# Patient Record
Sex: MALE | Race: BLACK OR AFRICAN AMERICAN | HISPANIC OR LATINO | Employment: OTHER | URBAN - METROPOLITAN AREA
[De-identification: names, ages, dates, MRNs, and addresses within clinical notes are randomized per-mention and may not be internally consistent; named-entity substitution may affect disease eponyms.]

---

## 2017-09-16 ENCOUNTER — HOSPITAL ENCOUNTER (EMERGENCY)
Facility: HOSPITAL | Age: 37
Discharge: HOME | End: 2017-09-16

## 2017-09-16 VITALS
DIASTOLIC BLOOD PRESSURE: 110 MMHG | SYSTOLIC BLOOD PRESSURE: 164 MMHG | BODY MASS INDEX: 32.44 KG/M2 | RESPIRATION RATE: 20 BRPM | OXYGEN SATURATION: 98 % | HEIGHT: 64 IN | WEIGHT: 190 LBS | TEMPERATURE: 98 F | HEART RATE: 90 BPM

## 2017-09-16 DIAGNOSIS — R60.9 EDEMA: ICD-10-CM

## 2017-09-16 DIAGNOSIS — I10 ASYMPTOMATIC HYPERTENSION: ICD-10-CM

## 2017-09-16 DIAGNOSIS — R60.0 BILATERAL EDEMA OF LOWER EXTREMITY: Primary | ICD-10-CM

## 2017-09-16 LAB
ALBUMIN SERPL BCP-MCNC: 3.8 G/DL
ALP SERPL-CCNC: 75 U/L
ALT SERPL W/O P-5'-P-CCNC: 50 U/L
ANION GAP SERPL CALC-SCNC: 9 MMOL/L
AST SERPL-CCNC: 30 U/L
BACTERIA #/AREA URNS HPF: NORMAL /HPF
BASOPHILS # BLD AUTO: 0.01 K/UL
BASOPHILS NFR BLD: 0.1 %
BILIRUB SERPL-MCNC: 0.3 MG/DL
BILIRUB UR QL STRIP: NEGATIVE
BNP SERPL-MCNC: 36 PG/ML
BUN SERPL-MCNC: 20 MG/DL
CALCIUM SERPL-MCNC: 10 MG/DL
CHLORIDE SERPL-SCNC: 104 MMOL/L
CLARITY UR: CLEAR
CO2 SERPL-SCNC: 26 MMOL/L
COLOR UR: ABNORMAL
CREAT SERPL-MCNC: 1.2 MG/DL
DIFFERENTIAL METHOD: ABNORMAL
EOSINOPHIL # BLD AUTO: 0.2 K/UL
EOSINOPHIL NFR BLD: 2.4 %
ERYTHROCYTE [DISTWIDTH] IN BLOOD BY AUTOMATED COUNT: 12.9 %
EST. GFR  (AFRICAN AMERICAN): >60 ML/MIN/1.73 M^2
EST. GFR  (NON AFRICAN AMERICAN): >60 ML/MIN/1.73 M^2
GLUCOSE SERPL-MCNC: 149 MG/DL
GLUCOSE UR QL STRIP: ABNORMAL
HCT VFR BLD AUTO: 38.6 %
HGB BLD-MCNC: 13.4 G/DL
HGB UR QL STRIP: ABNORMAL
HYALINE CASTS #/AREA URNS LPF: 0 /LPF
KETONES UR QL STRIP: NEGATIVE
LEUKOCYTE ESTERASE UR QL STRIP: ABNORMAL
LYMPHOCYTES # BLD AUTO: 2.3 K/UL
LYMPHOCYTES NFR BLD: 34.5 %
MCH RBC QN AUTO: 28.7 PG
MCHC RBC AUTO-ENTMCNC: 34.7 G/DL
MCV RBC AUTO: 83 FL
MICROSCOPIC COMMENT: NORMAL
MONOCYTES # BLD AUTO: 0.5 K/UL
MONOCYTES NFR BLD: 8 %
NEUTROPHILS # BLD AUTO: 3.7 K/UL
NEUTROPHILS NFR BLD: 54.4 %
NITRITE UR QL STRIP: NEGATIVE
PH UR STRIP: 6 [PH] (ref 5–8)
PLATELET # BLD AUTO: 273 K/UL
PMV BLD AUTO: 10.2 FL
POTASSIUM SERPL-SCNC: 4.2 MMOL/L
PROT SERPL-MCNC: 7.8 G/DL
PROT UR QL STRIP: ABNORMAL
RBC # BLD AUTO: 4.67 M/UL
RBC #/AREA URNS HPF: 3 /HPF (ref 0–4)
SODIUM SERPL-SCNC: 139 MMOL/L
SP GR UR STRIP: 1.02 (ref 1–1.03)
SQUAMOUS #/AREA URNS HPF: 1 /HPF
TSH SERPL DL<=0.005 MIU/L-ACNC: 1.16 UIU/ML
URN SPEC COLLECT METH UR: ABNORMAL
UROBILINOGEN UR STRIP-ACNC: NEGATIVE EU/DL
WBC # BLD AUTO: 6.76 K/UL
WBC #/AREA URNS HPF: 1 /HPF (ref 0–5)

## 2017-09-16 PROCEDURE — 84443 ASSAY THYROID STIM HORMONE: CPT

## 2017-09-16 PROCEDURE — 99284 EMERGENCY DEPT VISIT MOD MDM: CPT | Mod: 25

## 2017-09-16 PROCEDURE — 82962 GLUCOSE BLOOD TEST: CPT

## 2017-09-16 PROCEDURE — 80053 COMPREHEN METABOLIC PANEL: CPT

## 2017-09-16 PROCEDURE — 81000 URINALYSIS NONAUTO W/SCOPE: CPT

## 2017-09-16 PROCEDURE — 83880 ASSAY OF NATRIURETIC PEPTIDE: CPT

## 2017-09-16 PROCEDURE — 85025 COMPLETE CBC W/AUTO DIFF WBC: CPT

## 2017-09-16 RX ORDER — INSULIN ASPART 100 [IU]/ML
20 INJECTION, SUSPENSION SUBCUTANEOUS
COMMUNITY

## 2017-09-16 RX ORDER — SULINDAC 200 MG/1
200 TABLET ORAL 2 TIMES DAILY
Qty: 10 TABLET | Refills: 0 | Status: ON HOLD | OUTPATIENT
Start: 2017-09-16 | End: 2018-05-10 | Stop reason: HOSPADM

## 2017-09-16 RX ORDER — LISINOPRIL 10 MG/1
20 TABLET ORAL DAILY
Status: ON HOLD | COMMUNITY
End: 2022-09-15 | Stop reason: HOSPADM

## 2017-09-16 NOTE — ED PROVIDER NOTES
Encounter Date: 9/16/2017    SCRIBE #1 NOTE: I, Zi Ozzy, am scribing for, and in the presence of,  Dada Espinal PA-C. I have scribed the following portions of the note - Other sections scribed: HPI/ROS.       History     Chief Complaint   Patient presents with    Leg Swelling     bilat leg swelling x 2 days.  denies n/v/d or fever.  denies other complaints.     CC: Leg Swelling    HPI: This 37 y.o. Male with a medical history of diabetes mellitus and HTN presents to the ED c/o new, severe (8/10) bilateral leg swelling since 2 days ago. Patient reports that he drove to [place] from [place] and back prior to swelling. Patient notes bilateral calf pain when laying down. He also reports a mild, intermittent headache. Patient has been compliant with antihypertensive and diabetic medications. No other tx. No alleviating or exacerbating factors. Patient denies fever, chills, back pain, chest pain, SOB, abdominal pain, dysuria, difficulty urinating, numbness, weakness, and any hx of blood clots or blood disorders.       The history is provided by the patient. A  was used.     Review of patient's allergies indicates:  No Known Allergies  Past Medical History:   Diagnosis Date    Diabetes mellitus     Hypertension      Past Surgical History:   Procedure Laterality Date    APPENDECTOMY       No family history on file.  Social History   Substance Use Topics    Smoking status: Never Smoker    Smokeless tobacco: Not on file    Alcohol use Yes      Comment: occasionally      Review of Systems   Constitutional: Negative for chills and fever.   HENT: Negative for congestion, ear pain, rhinorrhea and sore throat.    Eyes: Negative for pain and visual disturbance.   Respiratory: Negative for cough and shortness of breath.    Cardiovascular: Positive for leg swelling. Negative for chest pain.   Gastrointestinal: Negative for abdominal pain, diarrhea, nausea and vomiting.   Genitourinary: Negative  for dysuria.   Musculoskeletal: Negative for back pain and neck pain.        (+) Bilateral Calf Pain   Skin: Negative for rash.   Neurological: Positive for headaches. Negative for weakness and numbness.       Physical Exam     Initial Vitals [09/16/17 0811]   BP Pulse Resp Temp SpO2   (!) 169/86 89 16 97.8 °F (36.6 °C) 98 %      MAP       113.67         Physical Exam    Vitals reviewed.  Constitutional: He appears well-developed and well-nourished. He is not diaphoretic. No distress.   HENT:   Head: Normocephalic and atraumatic.   Right Ear: External ear normal.   Left Ear: External ear normal.   Nose: Nose normal.   Eyes: Conjunctivae are normal. No scleral icterus.   Neck: Normal range of motion. Neck supple. No JVD present.   Cardiovascular: Normal rate, regular rhythm, normal heart sounds and intact distal pulses.   Pulmonary/Chest: Breath sounds normal. No respiratory distress. He has no wheezes. He has no rhonchi. He has no rales. He exhibits no tenderness.   Abdominal: Soft. He exhibits no distension and no mass. There is no tenderness. There is no rebound and no guarding.   Musculoskeletal: Normal range of motion. He exhibits edema. He exhibits no tenderness.   Pretibial pitting edema bilaterally.  No calf tenderness.   Lymphadenopathy:     He has no cervical adenopathy.   Neurological: He is alert and oriented to person, place, and time.   Skin: Skin is warm and dry. No rash noted. No erythema.         ED Course   Procedures  Labs Reviewed   COMPREHENSIVE METABOLIC PANEL - Abnormal; Notable for the following:        Result Value    Glucose 149 (*)     ALT 50 (*)     All other components within normal limits   URINALYSIS - Abnormal; Notable for the following:     Protein, UA 2+ (*)     Glucose, UA 2+ (*)     Occult Blood UA 1+ (*)     Leukocytes, UA Trace (*)     All other components within normal limits   CBC W/ AUTO DIFFERENTIAL - Abnormal; Notable for the following:     Hemoglobin 13.4 (*)     Hematocrit  38.6 (*)     All other components within normal limits   B-TYPE NATRIURETIC PEPTIDE   TSH   URINALYSIS MICROSCOPIC             Medical Decision Making:   Initial Assessment:   37-year-old male with hypertension complains of bilateral lower extremity edema ×2 days which began while driving back and forth between [place] and [place] over the last 3 days.  He denies shortness of breath, chest pain, abdominal pain, fever.  He presents in no distress, afebrile, slightly hypertensive with otherwise reassuring vital signs.  He has 2+ pretibial pitting edema bilaterally without erythema or calf tenderness.  Lungs sounds are clear with normal work of breathing.  Heart sounds are normal.  No JVD.  Abdomen soft and nontender.  No organomegaly.  Differential Diagnosis:   Volume overload, venous insufficiency, nephrotic syndrome, CHF, liver failure  ED Management:  Labs obtained show slightly elevated creatinine of 1.2 and 2+ protein in his urine.  Although patient does not have acute kidney injury he will need to follow-up with primary care closely for further evaluation of renal function.  Patient is on ACE inhibitor and was encouraged to continue taking as directed.  This should provide some renal protection if he is compliant.  EKG, chest x-ray, BNP are unrevealing.  I do not suspect CHF, liver failure, venous insufficiency.  Patient encouraged to eat low-sodium diet, obtain an where compression socks, and follow-up with PCP for reevaluation.  Patient verbalized understanding and agreed with plan.  Case discussed with Dr. Vallecillo.            Scribe Attestation:   Scribe #1: I performed the above scribed service and the documentation accurately describes the services I performed. I attest to the accuracy of the note.    Attending Attestation:     Physician Attestation Statement for NP/PA:   I discussed this assessment and plan of this patient with the NP/PA, but I did not personally examine the patient. The face to face  encounter was performed by the NP/PA.        Physician Attestation for Scribe:  Physician Attestation Statement for Scribe #1: I, Dada Espinal PA-C, reviewed documentation, as scribed by Zi Perciado in my presence, and it is both accurate and complete.                 ED Course      Clinical Impression:   The primary encounter diagnosis was Bilateral edema of lower extremity. Diagnoses of Edema and Asymptomatic hypertension were also pertinent to this visit.                           Dada Espinal PA-C  09/16/17 8667       Hamzah Vallecillo MD  09/17/17 1238

## 2017-09-16 NOTE — DISCHARGE INSTRUCTIONS
Eat a low-sodium diet.  Please obtain compression socks at any drug store and wear for reduction of leg swelling. Elevate your legs when sitting down.

## 2017-09-19 LAB — POCT GLUCOSE: 157 MG/DL (ref 70–110)

## 2018-05-05 ENCOUNTER — HOSPITAL ENCOUNTER (INPATIENT)
Facility: HOSPITAL | Age: 38
LOS: 4 days | Discharge: HOME | DRG: 580 | End: 2018-05-10
Attending: EMERGENCY MEDICINE | Admitting: INTERNAL MEDICINE

## 2018-05-05 DIAGNOSIS — L03.011 CELLULITIS OF FINGER OF RIGHT HAND: ICD-10-CM

## 2018-05-05 DIAGNOSIS — R22.31 LOCALIZED SWELLING ON RIGHT HAND: ICD-10-CM

## 2018-05-05 DIAGNOSIS — L03.90 CELLULITIS, UNSPECIFIED CELLULITIS SITE: Primary | ICD-10-CM

## 2018-05-05 PROCEDURE — 96375 TX/PRO/DX INJ NEW DRUG ADDON: CPT

## 2018-05-05 PROCEDURE — 99285 EMERGENCY DEPT VISIT HI MDM: CPT

## 2018-05-05 PROCEDURE — 96365 THER/PROPH/DIAG IV INF INIT: CPT

## 2018-05-05 PROCEDURE — 82962 GLUCOSE BLOOD TEST: CPT

## 2018-05-06 PROBLEM — I10 ESSENTIAL HYPERTENSION: Status: ACTIVE | Noted: 2018-05-06

## 2018-05-06 PROBLEM — L03.90 CELLULITIS: Status: ACTIVE | Noted: 2018-05-06

## 2018-05-06 PROBLEM — L03.011 CELLULITIS OF FINGER OF RIGHT HAND: Status: ACTIVE | Noted: 2018-05-06

## 2018-05-06 PROBLEM — E11.9 TYPE 2 DIABETES MELLITUS, WITH LONG-TERM CURRENT USE OF INSULIN: Status: ACTIVE | Noted: 2018-05-06

## 2018-05-06 PROBLEM — Z79.4 TYPE 2 DIABETES MELLITUS, WITH LONG-TERM CURRENT USE OF INSULIN: Status: ACTIVE | Noted: 2018-05-06

## 2018-05-06 PROBLEM — L03.811 CELLULITIS OF HEAD EXCEPT FACE: Status: ACTIVE | Noted: 2018-05-06

## 2018-05-06 PROBLEM — N17.9 AKI (ACUTE KIDNEY INJURY): Status: ACTIVE | Noted: 2018-05-06

## 2018-05-06 PROBLEM — E11.65 TYPE 2 DIABETES MELLITUS WITH HYPERGLYCEMIA, WITH LONG-TERM CURRENT USE OF INSULIN: Status: ACTIVE | Noted: 2018-05-06

## 2018-05-06 LAB
ALBUMIN SERPL BCP-MCNC: 3.4 G/DL
ALP SERPL-CCNC: 81 U/L
ALT SERPL W/O P-5'-P-CCNC: 25 U/L
ANION GAP SERPL CALC-SCNC: 9 MMOL/L
AST SERPL-CCNC: 21 U/L
BASOPHILS # BLD AUTO: 0.02 K/UL
BASOPHILS NFR BLD: 0.2 %
BILIRUB SERPL-MCNC: 0.3 MG/DL
BUN SERPL-MCNC: 31 MG/DL
CALCIUM SERPL-MCNC: 10 MG/DL
CHLORIDE SERPL-SCNC: 107 MMOL/L
CO2 SERPL-SCNC: 22 MMOL/L
CREAT SERPL-MCNC: 1.8 MG/DL
DIFFERENTIAL METHOD: ABNORMAL
EOSINOPHIL # BLD AUTO: 0.1 K/UL
EOSINOPHIL NFR BLD: 0.8 %
ERYTHROCYTE [DISTWIDTH] IN BLOOD BY AUTOMATED COUNT: 12.9 %
EST. GFR  (AFRICAN AMERICAN): 55 ML/MIN/1.73 M^2
EST. GFR  (NON AFRICAN AMERICAN): 47 ML/MIN/1.73 M^2
ESTIMATED AVG GLUCOSE: 220 MG/DL
GLUCOSE SERPL-MCNC: 161 MG/DL
HBA1C MFR BLD HPLC: 9.3 %
HCT VFR BLD AUTO: 32.3 %
HGB BLD-MCNC: 11.1 G/DL
LACTATE SERPL-SCNC: 0.8 MMOL/L
LYMPHOCYTES # BLD AUTO: 2.1 K/UL
LYMPHOCYTES NFR BLD: 17.5 %
MCH RBC QN AUTO: 28.4 PG
MCHC RBC AUTO-ENTMCNC: 34.4 G/DL
MCV RBC AUTO: 83 FL
MONOCYTES # BLD AUTO: 0.8 K/UL
MONOCYTES NFR BLD: 6.2 %
NEUTROPHILS # BLD AUTO: 9.2 K/UL
NEUTROPHILS NFR BLD: 75.1 %
PLATELET # BLD AUTO: 262 K/UL
PMV BLD AUTO: 10.7 FL
POCT GLUCOSE: 148 MG/DL (ref 70–110)
POCT GLUCOSE: 150 MG/DL (ref 70–110)
POCT GLUCOSE: 166 MG/DL (ref 70–110)
POCT GLUCOSE: 190 MG/DL (ref 70–110)
POCT GLUCOSE: 217 MG/DL (ref 70–110)
POTASSIUM SERPL-SCNC: 4.1 MMOL/L
PROT SERPL-MCNC: 7.4 G/DL
RBC # BLD AUTO: 3.91 M/UL
SODIUM SERPL-SCNC: 138 MMOL/L
WBC # BLD AUTO: 12.18 K/UL

## 2018-05-06 PROCEDURE — 63600175 PHARM REV CODE 636 W HCPCS: Performed by: NURSE PRACTITIONER

## 2018-05-06 PROCEDURE — 85025 COMPLETE CBC W/AUTO DIFF WBC: CPT

## 2018-05-06 PROCEDURE — 25000003 PHARM REV CODE 250: Performed by: NURSE PRACTITIONER

## 2018-05-06 PROCEDURE — S0077 INJECTION, CLINDAMYCIN PHOSP: HCPCS | Performed by: NURSE PRACTITIONER

## 2018-05-06 PROCEDURE — 36415 COLL VENOUS BLD VENIPUNCTURE: CPT

## 2018-05-06 PROCEDURE — 80053 COMPREHEN METABOLIC PANEL: CPT

## 2018-05-06 PROCEDURE — 11000001 HC ACUTE MED/SURG PRIVATE ROOM

## 2018-05-06 PROCEDURE — 83036 HEMOGLOBIN GLYCOSYLATED A1C: CPT

## 2018-05-06 PROCEDURE — 83605 ASSAY OF LACTIC ACID: CPT

## 2018-05-06 PROCEDURE — 87040 BLOOD CULTURE FOR BACTERIA: CPT

## 2018-05-06 RX ORDER — ACETAMINOPHEN 325 MG/1
650 TABLET ORAL EVERY 8 HOURS PRN
Status: DISCONTINUED | OUTPATIENT
Start: 2018-05-06 | End: 2018-05-10 | Stop reason: HOSPADM

## 2018-05-06 RX ORDER — HYDROCODONE BITARTRATE AND ACETAMINOPHEN 5; 325 MG/1; MG/1
1 TABLET ORAL EVERY 4 HOURS PRN
Status: DISCONTINUED | OUTPATIENT
Start: 2018-05-06 | End: 2018-05-10 | Stop reason: HOSPADM

## 2018-05-06 RX ORDER — SODIUM CHLORIDE 9 MG/ML
1000 INJECTION, SOLUTION INTRAVENOUS
Status: ACTIVE | OUTPATIENT
Start: 2018-05-06 | End: 2018-05-06

## 2018-05-06 RX ORDER — FAMOTIDINE 20 MG/1
20 TABLET, FILM COATED ORAL 2 TIMES DAILY
Status: DISCONTINUED | OUTPATIENT
Start: 2018-05-06 | End: 2018-05-06

## 2018-05-06 RX ORDER — IBUPROFEN 200 MG
16 TABLET ORAL
Status: DISCONTINUED | OUTPATIENT
Start: 2018-05-06 | End: 2018-05-10 | Stop reason: HOSPADM

## 2018-05-06 RX ORDER — SODIUM CHLORIDE 9 MG/ML
1000 INJECTION, SOLUTION INTRAVENOUS
Status: COMPLETED | OUTPATIENT
Start: 2018-05-06 | End: 2018-05-06

## 2018-05-06 RX ORDER — GLUCAGON 1 MG
1 KIT INJECTION
Status: DISCONTINUED | OUTPATIENT
Start: 2018-05-06 | End: 2018-05-10 | Stop reason: HOSPADM

## 2018-05-06 RX ORDER — IBUPROFEN 600 MG/1
600 TABLET ORAL
Status: COMPLETED | OUTPATIENT
Start: 2018-05-06 | End: 2018-05-06

## 2018-05-06 RX ORDER — AMOXICILLIN 250 MG
1 CAPSULE ORAL 2 TIMES DAILY
Status: DISCONTINUED | OUTPATIENT
Start: 2018-05-06 | End: 2018-05-10 | Stop reason: HOSPADM

## 2018-05-06 RX ORDER — MORPHINE SULFATE 10 MG/ML
4 INJECTION INTRAMUSCULAR; INTRAVENOUS; SUBCUTANEOUS EVERY 4 HOURS PRN
Status: DISCONTINUED | OUTPATIENT
Start: 2018-05-06 | End: 2018-05-10 | Stop reason: HOSPADM

## 2018-05-06 RX ORDER — ONDANSETRON 8 MG/1
8 TABLET, ORALLY DISINTEGRATING ORAL EVERY 8 HOURS PRN
Status: DISCONTINUED | OUTPATIENT
Start: 2018-05-06 | End: 2018-05-10 | Stop reason: HOSPADM

## 2018-05-06 RX ORDER — LIDOCAINE HYDROCHLORIDE AND EPINEPHRINE 10; 10 MG/ML; UG/ML
1 INJECTION, SOLUTION INFILTRATION; PERINEURAL ONCE
Status: DISCONTINUED | OUTPATIENT
Start: 2018-05-06 | End: 2018-05-10 | Stop reason: HOSPADM

## 2018-05-06 RX ORDER — IBUPROFEN 200 MG
24 TABLET ORAL
Status: DISCONTINUED | OUTPATIENT
Start: 2018-05-06 | End: 2018-05-10 | Stop reason: HOSPADM

## 2018-05-06 RX ORDER — CLINDAMYCIN PHOSPHATE 900 MG/50ML
900 INJECTION, SOLUTION INTRAVENOUS
Status: COMPLETED | OUTPATIENT
Start: 2018-05-06 | End: 2018-05-06

## 2018-05-06 RX ORDER — ONDANSETRON 4 MG/1
4 TABLET, ORALLY DISINTEGRATING ORAL EVERY 8 HOURS PRN
Status: DISCONTINUED | OUTPATIENT
Start: 2018-05-06 | End: 2018-05-10 | Stop reason: HOSPADM

## 2018-05-06 RX ORDER — INSULIN ASPART 100 [IU]/ML
1-10 INJECTION, SOLUTION INTRAVENOUS; SUBCUTANEOUS
Status: DISCONTINUED | OUTPATIENT
Start: 2018-05-06 | End: 2018-05-10 | Stop reason: HOSPADM

## 2018-05-06 RX ORDER — CLINDAMYCIN PHOSPHATE 900 MG/50ML
900 INJECTION, SOLUTION INTRAVENOUS
Status: DISCONTINUED | OUTPATIENT
Start: 2018-05-06 | End: 2018-05-10 | Stop reason: HOSPADM

## 2018-05-06 RX ORDER — CLINDAMYCIN PHOSPHATE 150 MG/ML
900 INJECTION, SOLUTION INTRAVENOUS
Status: DISCONTINUED | OUTPATIENT
Start: 2018-05-06 | End: 2018-05-06

## 2018-05-06 RX ADMIN — MORPHINE SULFATE 4 MG: 10 INJECTION INTRAVENOUS at 11:05

## 2018-05-06 RX ADMIN — SODIUM CHLORIDE 1000 ML: 0.9 INJECTION, SOLUTION INTRAVENOUS at 02:05

## 2018-05-06 RX ADMIN — CLINDAMYCIN IN 5 PERCENT DEXTROSE 900 MG: 18 INJECTION, SOLUTION INTRAVENOUS at 09:05

## 2018-05-06 RX ADMIN — CLINDAMYCIN IN 5 PERCENT DEXTROSE 900 MG: 18 INJECTION, SOLUTION INTRAVENOUS at 02:05

## 2018-05-06 RX ADMIN — DOCUSATE SODIUM AND SENNOSIDES 1 TABLET: 8.6; 5 TABLET, FILM COATED ORAL at 08:05

## 2018-05-06 RX ADMIN — HYDROCODONE BITARTRATE AND ACETAMINOPHEN 1 TABLET: 5; 325 TABLET ORAL at 06:05

## 2018-05-06 RX ADMIN — HYDROCODONE BITARTRATE AND ACETAMINOPHEN 1 TABLET: 5; 325 TABLET ORAL at 02:05

## 2018-05-06 RX ADMIN — VANCOMYCIN HYDROCHLORIDE 2000 MG: 1 INJECTION, POWDER, LYOPHILIZED, FOR SOLUTION INTRAVENOUS at 02:05

## 2018-05-06 RX ADMIN — CLINDAMYCIN IN 5 PERCENT DEXTROSE 900 MG: 18 INJECTION, SOLUTION INTRAVENOUS at 06:05

## 2018-05-06 RX ADMIN — HYDROCODONE BITARTRATE AND ACETAMINOPHEN 1 TABLET: 5; 325 TABLET ORAL at 09:05

## 2018-05-06 RX ADMIN — INSULIN ASPART 2 UNITS: 100 INJECTION, SOLUTION INTRAVENOUS; SUBCUTANEOUS at 08:05

## 2018-05-06 RX ADMIN — IBUPROFEN 600 MG: 600 TABLET, FILM COATED ORAL at 01:05

## 2018-05-06 RX ADMIN — MORPHINE SULFATE 4 MG: 10 INJECTION INTRAVENOUS at 12:05

## 2018-05-06 NOTE — ED TRIAGE NOTES
"Pt here for right hand swelling and wound to right side of forehead. Pt reports "having a bump on head. I took needle and popped it and now its like this". Reports same thing to right fourth finger; now with hand swelling. Symptoms began 7 days ago.   "

## 2018-05-06 NOTE — PLAN OF CARE
Problem: Patient Care Overview  Goal: Plan of Care Review  Outcome: Ongoing (interventions implemented as appropriate)  Orthopedic MD consulted and visited patient and his wife, nothing by mouth after midnight, continues to receive iv antibiotics for right hand swelling, language line  used to discuss plan of care throughout the day, patient newly diagnosed with diabetes, pain is an ongoing intervention

## 2018-05-06 NOTE — CONSULTS
SHEA. Right hand cellulitis    HPI: Robel Anthony37 y.o. complaining of right hand cellulitis for the last 3 days. He was admitted through the ED last night and started on IN abx. He denies any injury tot he right hand. Most of the pain is associated with the dorsal aspect of the 4th finger. No pain palmerly at this point.    ROS:   Pertinent positives: right hand swelling and cellulitis   Negatives: F/C, N/V, CP, SOB,    All other 14 point ROS negative    PMH:   Past Medical History:   Diagnosis Date    Diabetes mellitus     Hypertension        PSH:   Past Surgical History:   Procedure Laterality Date    APPENDECTOMY         Social Hx:   Social History     Occupational History    Not on file.     Social History Main Topics    Smoking status: Never Smoker    Smokeless tobacco: Never Used    Alcohol use Yes      Comment: occasionally     Drug use: No    Sexual activity: Not on file       Medications:    No current facility-administered medications on file prior to encounter.      Current Outpatient Prescriptions on File Prior to Encounter   Medication Sig Dispense Refill    lisinopril 10 MG tablet Take 10 mg by mouth once daily.      butalbital-acetaminophen-caffeine -40 mg (FIORICET) -40 mg per tablet Take 1-2 tablets by mouth every 6 (six) hours as needed for Pain. 20 tablet 0    ibuprofen (ADVIL,MOTRIN) 600 MG tablet Take 1 tablet (600 mg total) by mouth every 6 (six) hours as needed for Pain. 20 tablet 0    insulin aspart protamine-insulin aspart (NOVOLOG 70/30) 100 unit/mL (70-30) InPn pen Inject 20 Units into the skin after dinner.      sulindac (CLINORIL) 200 MG Tab Take 1 tablet (200 mg total) by mouth 2 (two) times daily. 10 tablet 0    tizanidine (ZANAFLEX) 4 MG tablet Take 4 mg by mouth every 6 (six) hours as needed.           PE:         Vitals:    05/06/18 0811   BP: (!) 162/94   Pulse: 77   Resp: 17   Temp: 98 °F (36.7 °C)       Estimated body mass index is 34.33 kg/m² as  "calculated from the following:    Height as of this encounter: 5' 4" (1.626 m).    Weight as of this encounter: 90.7 kg (200 lb).     General WDWN, NAD     Extremity: Right hand  Finger  Mild erythema and cellulitis to the dorsal hand.   Mild pain with palpation over the dorsum of the hand.  No pain palmerly  No pain over flexor tendon of the 4th finger  No pain with forced extension    Labs:    Lab Results   Component Value Date    WBC 12.18 05/06/2018    HGB 11.1 (L) 05/06/2018    HCT 32.3 (L) 05/06/2018    MCV 83 05/06/2018     05/06/2018       BMP  Lab Results   Component Value Date     05/06/2018    K 4.1 05/06/2018     05/06/2018    CO2 22 (L) 05/06/2018    BUN 31 (H) 05/06/2018    CREATININE 1.8 (H) 05/06/2018    CALCIUM 10.0 05/06/2018    ANIONGAP 9 05/06/2018    ESTGFRAFRICA 55 (A) 05/06/2018    EGFRNONAA 47 (A) 05/06/2018       No results found for: INR, PROTIME    No results found for: SEDRATE    No results found for: CRP    Radiography:  Film    Interpretation    Right hand soft tissue swelling.   Foreign body at index finger over middle phalanx dorsally    A/P  37 y.o.male with right hand cellulitis    Pt still with normal white count denies fevers.   Just started IV abx last night. This is the initial treatment at this point  Recommend to continue this and ice to the right hand with elevation  Will recheck in AM. If worsening will perform ID if necessary    The foreign body in the index finger appears to be chronic and asymptomatic    NPO past midnight.        Patrick Owusu MD   "

## 2018-05-06 NOTE — HPI
"Mr. Robel Desai is a 37 y.o. man with DM who presents with swelling of R hand and forehead. States that he developed "pimples" on his forehead and R hand that "exploded" and started to drain and are now swollen and painful. Lesion on head started 1 week ago; lesion on finger started on Friday as a small "pimple" that gradually increased. He works installing insulation. 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 No pets at home. Denies bug bites. Denies IVDU. Denies fevers, chills, other lesions. Asking repeatedly for it to be lanced.   "

## 2018-05-06 NOTE — NURSING
Pt arrived to [facility] floor via transport. Pt speaks no english. Language line used to orient pt to room and ask admission questions. POC d/w pt. Pt verb understanding. R hand edematous with bump noted to forehead. No notable drainage. No other questions at this time.

## 2018-05-06 NOTE — H&P
"[facility] Medicine  History & Physical    Patient Name: Robel Desai  MRN: [id number]  Admission Date: 5/5/2018  Attending Physician: Cori Dumont MD   Primary Care Provider: Primary Doctor No         Patient information was obtained from patient, past medical records and ER records.     Subjective:     Principal Problem:Cellulitis of finger of right hand    Chief Complaint:   Chief Complaint   Patient presents with    Hand Swelling     Pt presents with swelling to right hand x3 days, reports started out as a staph infection and when it "popped" his hand began to swell. Pt also presents with staph infection to right forehead x7 days        HPI: Mr. Robel Desai is a 37 y.o. man with DM who presents with swelling of R hand and forehead. States that he developed "pimples" on his forehead and R hand that "exploded" and started to drain and are now swollen and painful. Lesion on head started 1 week ago; lesion on finger started on Friday as a small "pimple" that gradually increased. He works installing insulation. 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 No pets at home. Denies bug bites. Denies IVDU. Denies fevers, chills, other lesions. Asking repeatedly for it to be lanced.     Past Medical History:   Diagnosis Date    Diabetes mellitus     Hypertension        Past Surgical History:   Procedure Laterality Date    APPENDECTOMY         Review of patient's allergies indicates:  No Known Allergies    No current facility-administered medications on file prior to encounter.      Current Outpatient Prescriptions on File Prior to Encounter   Medication Sig    lisinopril 10 MG tablet Take 10 mg by mouth once daily.    butalbital-acetaminophen-caffeine -40 mg (FIORICET) -40 mg per tablet Take 1-2 tablets by mouth every 6 (six) hours as needed for Pain.    ibuprofen (ADVIL,MOTRIN) 600 MG tablet Take 1 tablet (600 mg total) by mouth every 6 (six) hours as needed for Pain.    insulin aspart " protamine-insulin aspart (NOVOLOG 70/30) 100 unit/mL (70-30) InPn pen Inject 20 Units into the skin after dinner.    sulindac (CLINORIL) 200 MG Tab Take 1 tablet (200 mg total) by mouth 2 (two) times daily.    tizanidine (ZANAFLEX) 4 MG tablet Take 4 mg by mouth every 6 (six) hours as needed.     Family History     None        Social History Main Topics    Smoking status: Never Smoker    Smokeless tobacco: Never Used    Alcohol use Yes      Comment: occasionally     Drug use: No    Sexual activity: Not on file     Review of Systems   Constitutional: Negative for activity change, appetite change, chills, diaphoresis and fever.   HENT: Negative for congestion, mouth sores, postnasal drip, rhinorrhea, sinus pain, sinus pressure and sore throat.    Eyes: Negative for visual disturbance.   Respiratory: Negative for cough, choking, chest tightness, shortness of breath and wheezing.    Cardiovascular: Negative for chest pain, palpitations and leg swelling.   Gastrointestinal: Negative for abdominal pain, constipation, diarrhea, nausea and vomiting.   Genitourinary: Negative for decreased urine volume and difficulty urinating.   Musculoskeletal: Negative for arthralgias and joint swelling.   Skin: Positive for rash and wound.   Neurological: Negative for dizziness, seizures, speech difficulty, weakness, light-headedness, numbness and headaches.   Hematological: Negative for adenopathy.     Objective:     Vital Signs (Most Recent):  Temp: 98.8 °F (37.1 °C) (05/06/18 1204)  Pulse: 85 (05/06/18 1204)  Resp: 18 (05/06/18 1204)  BP: 139/71 (05/06/18 1204)  SpO2: 100 % (05/06/18 1204) Vital Signs (24h Range):  Temp:  [97.6 °F (36.4 °C)-99.5 °F (37.5 °C)] 98.8 °F (37.1 °C)  Pulse:  [] 85  Resp:  [16-18] 18  SpO2:  [96 %-100 %] 100 %  BP: (120-169)/(68-94) 139/71     Weight: 90.7 kg (200 lb)  Body mass index is 34.33 kg/m².    Physical Exam   Constitutional: He is oriented to person, place, and time. He appears  well-developed and well-nourished. No distress.   Obese man   HENT:   Nose: Nose normal.   Mouth/Throat: Oropharynx is clear and moist. No oropharyngeal exudate.   Right forehead has a 2cm circular nodule with abrasion, small serosanguinous drainage, no surrounding erythema, no fluctuance   Eyes: Conjunctivae and EOM are normal. Pupils are equal, round, and reactive to light. No scleral icterus.   Neck: Neck supple. No JVD present.   Cardiovascular: Normal rate, regular rhythm, normal heart sounds and intact distal pulses.  Exam reveals no gallop and no friction rub.    No murmur heard.  Pulmonary/Chest: Effort normal and breath sounds normal. No respiratory distress. He has no wheezes. He has no rales. He exhibits no tenderness.   Abdominal: Soft. Bowel sounds are normal. He exhibits no distension and no mass. There is no tenderness. There is no guarding.   Surgical scars   Musculoskeletal: He exhibits no edema.   Neurological: He is alert and oriented to person, place, and time. No cranial nerve deficit or sensory deficit.   Skin: He is not diaphoretic.   R hand swollen, erythematous, warm to touch. normal painless range of motion. ~1cm indurated area on dorsal surface of 4th finger   Nursing note and vitals reviewed.        CRANIAL NERVES     CN III, IV, VI   Pupils are equal, round, and reactive to light.  Extraocular motions are normal.        Significant Labs: All pertinent labs within the past 24 hours have been reviewed.    Significant Imaging: I have reviewed and interpreted all pertinent imaging results/findings within the past 24 hours.    Assessment/Plan:     * Cellulitis of finger of right hand    - presents with right forehead and right hand cellulitis. Does not meet sepsis criteria. Failed outpatient PCN  - started on clindamycin for presumed MRSA infection  - no abscess formation at this time  - XR R hand showing soft tissue swelling  - MRI pending  - blood cultures NGTD  - orthopaedics consulted,  will watch along. NPO at [facility] in case I&D is needed  - continue clindamycin          MYLA (acute kidney injury)    Cr 1.8 on arrival from 1.2 in 2017. Unknown if this is MYLA or progression of CKD  Given IVF in ED  Will continue to monitor  Avoid nephrotoxins, renally dose all medications         Essential hypertension    - patient does not know home med  - intermittently HTN  - will continue to monitor off Rx for now            Type 2 diabetes mellitus with hyperglycemia, with long-term current use of insulin    - A1c 9.3%  - patient states that he takes 10U insulin in AM and 40U in PM but is unclear what type  - ADA diet, accuchecks, hypoglycemic protocol  - glucose at goal with no insulin at this time  - will continue to monitor  - SSI  - start basal bolus if glucoses consistently >180          Cellulitis of head except face    - see above            VTE Risk Mitigation         Ordered     IP VTE HIGH RISK PATIENT  Once      05/06/18 0531     Place MATTHIAS hose  Until discontinued      05/06/18 0531             Cori Dumont MD  Department of Hospital Medicine   [facility] [place]

## 2018-05-06 NOTE — ED PROVIDER NOTES
"Encounter Date: 5/5/2018       History     Chief Complaint   Patient presents with    Hand Swelling     Pt presents with swelling to right hand x3 days, reports started out as a staph infection and when it "popped" his hand began to swell. Pt also presents with staph infection to right forehead x7 days     This is a 37-year-old male who presented to the emergency department for emergent evaluation of right hand pain and swelling x3 days.  He also reports having a right forehead abscess for 1 week.  He states that he had a little blister burst on his right hand and subsequent swelling. He rates his pain as 3/10 described this as a dull the ache.  He denies fever, nausea, vomiting, chest pain, shortness of breath, abdominal pain. Patient denies history of abscess.  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  He reports that he started taking penicillin 500 mg twice daily 2 days ago.      The history is provided by the patient. The history is limited by a language barrier. A  was used.     Review of patient's allergies indicates:  No Known Allergies  Past Medical History:   Diagnosis Date    Diabetes mellitus     Hypertension      Past Surgical History:   Procedure Laterality Date    APPENDECTOMY       History reviewed. No pertinent family history.  Social History   Substance Use Topics    Smoking status: Never Smoker    Smokeless tobacco: Never Used    Alcohol use Yes      Comment: occasionally      Review of Systems   Constitutional: Negative for chills, fatigue and fever.   HENT: Negative for sore throat.    Respiratory: Negative for shortness of breath.    Cardiovascular: Negative for chest pain and palpitations.   Gastrointestinal: Negative for nausea.   Genitourinary: Negative for difficulty urinating, dysuria and urgency.   Musculoskeletal: Negative for back pain.   Skin: Positive for wound (right hand, right forehead). Negative for rash.   Neurological: " Negative for dizziness, weakness, light-headedness and headaches.   Hematological: Does not bruise/bleed easily.       Physical Exam     Initial Vitals [05/05/18 2315]   BP Pulse Resp Temp SpO2   131/69 104 16 99.5 °F (37.5 °C) 98 %      MAP       89.67         Physical Exam    Nursing note and vitals reviewed.  Constitutional: Vital signs are normal. He appears well-developed and well-nourished. He is not diaphoretic. He is cooperative.  Non-toxic appearance. He does not have a sickly appearance. He does not appear ill. No distress.   HENT:   Head: Normocephalic and atraumatic.   Right Ear: External ear normal.   Left Ear: External ear normal.   Nose: Nose normal.   Mouth/Throat: Oropharynx is clear and moist. No oropharyngeal exudate.   Eyes: Conjunctivae and EOM are normal. Pupils are equal, round, and reactive to light.   Neck: Normal range of motion and full passive range of motion without pain. Neck supple.   Cardiovascular: Normal rate, regular rhythm, normal heart sounds, intact distal pulses and normal pulses. Exam reveals no decreased pulses.    Pulmonary/Chest: Effort normal and breath sounds normal. No respiratory distress. He has no decreased breath sounds.   Abdominal: Soft. Bowel sounds are normal. There is no tenderness. There is no rigidity, no rebound, no guarding and no CVA tenderness.   Musculoskeletal: Normal range of motion.        Right hand: Comments: Diffuse erythema and edema extending up the palmar and dorsal aspect of the hand into the right forearm.  Slightly tender to palpation.  Patient has limited range of motion secondary to edema.  Cap refills less than 2 sec, full sensation, full strength noted.        Hands:  Lymphadenopathy:        Head (right side): No tonsillar adenopathy present.        Head (left side): No tonsillar adenopathy present.     He has no cervical adenopathy.   Neurological: He is alert and oriented to person, place, and time. He has normal strength. No cranial  nerve deficit or sensory deficit. Coordination and gait normal. GCS eye subscore is 4. GCS verbal subscore is 5. GCS motor subscore is 6.   Skin: Skin is warm and dry. Capillary refill takes less than 2 seconds. Abscess noted. No rash noted. There is erythema.   Psychiatric: He has a normal mood and affect. His behavior is normal. Judgment and thought content normal.                     ED Course   Procedures  Labs Reviewed   CBC W/ AUTO DIFFERENTIAL - Abnormal; Notable for the following:        Result Value    RBC 3.91 (*)     Hemoglobin 11.1 (*)     Hematocrit 32.3 (*)     Gran # (ANC) 9.2 (*)     Gran% 75.1 (*)     Lymph% 17.5 (*)     All other components within normal limits   COMPREHENSIVE METABOLIC PANEL - Abnormal; Notable for the following:     CO2 22 (*)     Glucose 161 (*)     BUN, Bld 31 (*)     Creatinine 1.8 (*)     Albumin 3.4 (*)     eGFR if  55 (*)     eGFR if non  47 (*)     All other components within normal limits   POCT GLUCOSE - Abnormal; Notable for the following:     POCT Glucose 150 (*)     All other components within normal limits   CULTURE, BLOOD   CULTURE, BLOOD   POCT GLUCOSE MONITORING CONTINUOUS                   APC / Resident Notes:   Robel Desai is a 37 y.o. male who presents to the Emergency Department for evaluation of  right hand swelling x3 days after popping a blister on his finger, small abscess to right forehead.  Denies fever, chills, nausea vomiting, abdominal pain, chest pain, shortness of breath or any other symptoms.       Physical Exam shows a non-toxic, afebrile, and well appearing male. Pertinent exam findings include Diffuse erythema and edema extending up the palmar and dorsal aspect of the hand into the right forearm.  Slightly tender to palpation.  Patient has limited range of motion secondary to edema.  Cap refills less than 2 sec, full sensation, full strength noted. Distal pulses are intact. Small 1 x 1 cm area noted to right  forehead with scab in place, no drainage, nontender to palpation. There was pressure placed around the site with no drainage, in the area is Hard but without induration or cellulitic appearance.    Vital Signs Are Reassuring. If available, previous records reviewed.     RESULTS:  Blood cultures pending, glucose 150, CBC shows slight anemia otherwise unremarkable for infection, CMP shows elevated BUN creatinine 1.8/31 with a decreased GFR when compared to previous lab results on file.  Lactic acid pending, urinalysis pending.  A CT scan with contrast was ordered of the right hand.  After discussing this with radiologist, he suggests a plain film of the hand with MRI to follow.  This was relayed to Dr. Wen who agrees with this plan.      My overall impression is cellulitis, acute kidney injury. I considered but do not suspect at this time gout, septic arthritis, fracture or dislocation.    This this case was discussed with Dr. Hess and will admit to Hospital Medicine for IV antibiotics.  This patient was removed from the treatment area prior to incision and drainage of right forehead abscess.    This case was discussed with and was evaluated by Dr. Wen who is in agreement with my assessment and plan.            Attending Attestation:             Attending ED Notes:    I discussed the patient's care with Advanced Practice Clinician and saw the patient as well.  I agree with the history, physical, assessment, diagnosis, treatment, and discharge plan provided by the Advanced Practice Clinician.  Patient has cellulitis versus an abscess in his hand.  There to foreign bodies noted on x-ray.  He will be admitted to the hospital for IV antibiotics with orthopedic consultation.             Clinical Impression:   The primary encounter diagnosis was Cellulitis, unspecified cellulitis site. A diagnosis of Localized swelling on right hand was also pertinent to this visit.                           Hamzah Wen,  MD  05/06/18 1719       Hamzah Wen MD  05/06/18 1721       Janine Ortiz NP  05/07/18 0056       Janine Ortiz NP  05/07/18 0057       Janine Ortiz NP  05/07/18 0058

## 2018-05-06 NOTE — SUBJECTIVE & OBJECTIVE
Past Medical History:   Diagnosis Date    Diabetes mellitus     Hypertension        Past Surgical History:   Procedure Laterality Date    APPENDECTOMY         Review of patient's allergies indicates:  No Known Allergies    No current facility-administered medications on file prior to encounter.      Current Outpatient Prescriptions on File Prior to Encounter   Medication Sig    lisinopril 10 MG tablet Take 10 mg by mouth once daily.    butalbital-acetaminophen-caffeine -40 mg (FIORICET) -40 mg per tablet Take 1-2 tablets by mouth every 6 (six) hours as needed for Pain.    ibuprofen (ADVIL,MOTRIN) 600 MG tablet Take 1 tablet (600 mg total) by mouth every 6 (six) hours as needed for Pain.    insulin aspart protamine-insulin aspart (NOVOLOG 70/30) 100 unit/mL (70-30) InPn pen Inject 20 Units into the skin after dinner.    sulindac (CLINORIL) 200 MG Tab Take 1 tablet (200 mg total) by mouth 2 (two) times daily.    tizanidine (ZANAFLEX) 4 MG tablet Take 4 mg by mouth every 6 (six) hours as needed.     Family History     None        Social History Main Topics    Smoking status: Never Smoker    Smokeless tobacco: Never Used    Alcohol use Yes      Comment: occasionally     Drug use: No    Sexual activity: Not on file     Review of Systems   Constitutional: Negative for activity change, appetite change, chills, diaphoresis and fever.   HENT: Negative for congestion, mouth sores, postnasal drip, rhinorrhea, sinus pain, sinus pressure and sore throat.    Eyes: Negative for visual disturbance.   Respiratory: Negative for cough, choking, chest tightness, shortness of breath and wheezing.    Cardiovascular: Negative for chest pain, palpitations and leg swelling.   Gastrointestinal: Negative for abdominal pain, constipation, diarrhea, nausea and vomiting.   Genitourinary: Negative for decreased urine volume and difficulty urinating.   Musculoskeletal: Negative for arthralgias and joint swelling.   Skin:  Positive for rash and wound.   Neurological: Negative for dizziness, seizures, speech difficulty, weakness, light-headedness, numbness and headaches.   Hematological: Negative for adenopathy.     Objective:     Vital Signs (Most Recent):  Temp: 98.8 °F (37.1 °C) (05/06/18 1204)  Pulse: 85 (05/06/18 1204)  Resp: 18 (05/06/18 1204)  BP: 139/71 (05/06/18 1204)  SpO2: 100 % (05/06/18 1204) Vital Signs (24h Range):  Temp:  [97.6 °F (36.4 °C)-99.5 °F (37.5 °C)] 98.8 °F (37.1 °C)  Pulse:  [] 85  Resp:  [16-18] 18  SpO2:  [96 %-100 %] 100 %  BP: (120-169)/(68-94) 139/71     Weight: 90.7 kg (200 lb)  Body mass index is 34.33 kg/m².    Physical Exam   Constitutional: He is oriented to person, place, and time. He appears well-developed and well-nourished. No distress.   Obese man   HENT:   Nose: Nose normal.   Mouth/Throat: Oropharynx is clear and moist. No oropharyngeal exudate.   Right forehead has a 2cm circular nodule with abrasion, small serosanguinous drainage, no surrounding erythema, no fluctuance   Eyes: Conjunctivae and EOM are normal. Pupils are equal, round, and reactive to light. No scleral icterus.   Neck: Neck supple. No JVD present.   Cardiovascular: Normal rate, regular rhythm, normal heart sounds and intact distal pulses.  Exam reveals no gallop and no friction rub.    No murmur heard.  Pulmonary/Chest: Effort normal and breath sounds normal. No respiratory distress. He has no wheezes. He has no rales. He exhibits no tenderness.   Abdominal: Soft. Bowel sounds are normal. He exhibits no distension and no mass. There is no tenderness. There is no guarding.   Surgical scars   Musculoskeletal: He exhibits no edema.   Neurological: He is alert and oriented to person, place, and time. No cranial nerve deficit or sensory deficit.   Skin: He is not diaphoretic.   R hand swollen, erythematous, warm to touch. normal painless range of motion. ~1cm indurated area on dorsal surface of 4th finger   Nursing note and  vitals reviewed.        CRANIAL NERVES     CN III, IV, VI   Pupils are equal, round, and reactive to light.  Extraocular motions are normal.        Significant Labs: All pertinent labs within the past 24 hours have been reviewed.    Significant Imaging: I have reviewed and interpreted all pertinent imaging results/findings within the past 24 hours.

## 2018-05-07 ENCOUNTER — SURGERY (OUTPATIENT)
Age: 38
End: 2018-05-07

## 2018-05-07 ENCOUNTER — ANESTHESIA (OUTPATIENT)
Dept: SURGERY | Facility: HOSPITAL | Age: 38
DRG: 580 | End: 2018-05-07

## 2018-05-07 ENCOUNTER — ANESTHESIA EVENT (OUTPATIENT)
Dept: SURGERY | Facility: HOSPITAL | Age: 38
DRG: 580 | End: 2018-05-07

## 2018-05-07 LAB
ANION GAP SERPL CALC-SCNC: 6 MMOL/L
BACTERIA #/AREA URNS HPF: NORMAL /HPF
BASOPHILS # BLD AUTO: 0.01 K/UL
BASOPHILS NFR BLD: 0.1 %
BILIRUB UR QL STRIP: NEGATIVE
BUN SERPL-MCNC: 20 MG/DL
CALCIUM SERPL-MCNC: 9.2 MG/DL
CHLORIDE SERPL-SCNC: 108 MMOL/L
CLARITY UR: CLEAR
CO2 SERPL-SCNC: 25 MMOL/L
COLOR UR: YELLOW
CREAT SERPL-MCNC: 1.2 MG/DL
DIFFERENTIAL METHOD: ABNORMAL
EOSINOPHIL # BLD AUTO: 0.1 K/UL
EOSINOPHIL NFR BLD: 0.9 %
ERYTHROCYTE [DISTWIDTH] IN BLOOD BY AUTOMATED COUNT: 12.9 %
EST. GFR  (AFRICAN AMERICAN): >60 ML/MIN/1.73 M^2
EST. GFR  (NON AFRICAN AMERICAN): >60 ML/MIN/1.73 M^2
GLUCOSE SERPL-MCNC: 129 MG/DL
GLUCOSE UR QL STRIP: ABNORMAL
HCT VFR BLD AUTO: 35.4 %
HGB BLD-MCNC: 11.9 G/DL
HGB UR QL STRIP: ABNORMAL
HYALINE CASTS #/AREA URNS LPF: 0 /LPF
KETONES UR QL STRIP: NEGATIVE
LEUKOCYTE ESTERASE UR QL STRIP: NEGATIVE
LYMPHOCYTES # BLD AUTO: 1.8 K/UL
LYMPHOCYTES NFR BLD: 16.3 %
MCH RBC QN AUTO: 27.8 PG
MCHC RBC AUTO-ENTMCNC: 33.6 G/DL
MCV RBC AUTO: 83 FL
MICROSCOPIC COMMENT: NORMAL
MONOCYTES # BLD AUTO: 1 K/UL
MONOCYTES NFR BLD: 9.5 %
NEUTROPHILS # BLD AUTO: 7.9 K/UL
NEUTROPHILS NFR BLD: 73 %
NITRITE UR QL STRIP: NEGATIVE
PH UR STRIP: 5 [PH] (ref 5–8)
PLATELET # BLD AUTO: 288 K/UL
PMV BLD AUTO: 10.6 FL
POCT GLUCOSE: 129 MG/DL (ref 70–110)
POCT GLUCOSE: 140 MG/DL (ref 70–110)
POCT GLUCOSE: 150 MG/DL (ref 70–110)
POCT GLUCOSE: 243 MG/DL (ref 70–110)
POTASSIUM SERPL-SCNC: 4.2 MMOL/L
PROT UR QL STRIP: ABNORMAL
RBC # BLD AUTO: 4.28 M/UL
RBC #/AREA URNS HPF: 1 /HPF (ref 0–4)
SODIUM SERPL-SCNC: 139 MMOL/L
SP GR UR STRIP: 1.01 (ref 1–1.03)
URN SPEC COLLECT METH UR: ABNORMAL
UROBILINOGEN UR STRIP-ACNC: NEGATIVE EU/DL
WBC # BLD AUTO: 10.75 K/UL
WBC #/AREA URNS HPF: 0 /HPF (ref 0–5)

## 2018-05-07 PROCEDURE — S0028 INJECTION, FAMOTIDINE, 20 MG: HCPCS | Performed by: NURSE ANESTHETIST, CERTIFIED REGISTERED

## 2018-05-07 PROCEDURE — 11000001 HC ACUTE MED/SURG PRIVATE ROOM

## 2018-05-07 PROCEDURE — 87070 CULTURE OTHR SPECIMN AEROBIC: CPT

## 2018-05-07 PROCEDURE — 36415 COLL VENOUS BLD VENIPUNCTURE: CPT

## 2018-05-07 PROCEDURE — 63600175 PHARM REV CODE 636 W HCPCS: Performed by: NURSE ANESTHETIST, CERTIFIED REGISTERED

## 2018-05-07 PROCEDURE — 81000 URINALYSIS NONAUTO W/SCOPE: CPT

## 2018-05-07 PROCEDURE — 87205 SMEAR GRAM STAIN: CPT

## 2018-05-07 PROCEDURE — S0077 INJECTION, CLINDAMYCIN PHOSP: HCPCS | Performed by: NURSE PRACTITIONER

## 2018-05-07 PROCEDURE — 36000704 HC OR TIME LEV I 1ST 15 MIN: Performed by: ORTHOPAEDIC SURGERY

## 2018-05-07 PROCEDURE — 71000033 HC RECOVERY, INTIAL HOUR: Performed by: ORTHOPAEDIC SURGERY

## 2018-05-07 PROCEDURE — 87077 CULTURE AEROBIC IDENTIFY: CPT

## 2018-05-07 PROCEDURE — 36000705 HC OR TIME LEV I EA ADD 15 MIN: Performed by: ORTHOPAEDIC SURGERY

## 2018-05-07 PROCEDURE — 87186 SC STD MICRODIL/AGAR DIL: CPT

## 2018-05-07 PROCEDURE — 85025 COMPLETE CBC W/AUTO DIFF WBC: CPT

## 2018-05-07 PROCEDURE — 87075 CULTR BACTERIA EXCEPT BLOOD: CPT

## 2018-05-07 PROCEDURE — 87102 FUNGUS ISOLATION CULTURE: CPT

## 2018-05-07 PROCEDURE — 37000008 HC ANESTHESIA 1ST 15 MINUTES: Performed by: ORTHOPAEDIC SURGERY

## 2018-05-07 PROCEDURE — 80048 BASIC METABOLIC PNL TOTAL CA: CPT

## 2018-05-07 PROCEDURE — 25000003 PHARM REV CODE 250: Performed by: NURSE ANESTHETIST, CERTIFIED REGISTERED

## 2018-05-07 PROCEDURE — 0JDJ0ZZ EXTRACTION OF RIGHT HAND SUBCUTANEOUS TISSUE AND FASCIA, OPEN APPROACH: ICD-10-PCS | Performed by: ORTHOPAEDIC SURGERY

## 2018-05-07 PROCEDURE — 71000039 HC RECOVERY, EACH ADD'L HOUR: Performed by: ORTHOPAEDIC SURGERY

## 2018-05-07 PROCEDURE — 25000003 PHARM REV CODE 250: Performed by: NURSE PRACTITIONER

## 2018-05-07 PROCEDURE — 0J9J0ZZ DRAINAGE OF RIGHT HAND SUBCUTANEOUS TISSUE AND FASCIA, OPEN APPROACH: ICD-10-PCS | Performed by: ORTHOPAEDIC SURGERY

## 2018-05-07 PROCEDURE — 63600175 PHARM REV CODE 636 W HCPCS: Performed by: NURSE PRACTITIONER

## 2018-05-07 PROCEDURE — 0J9J0ZX DRAINAGE OF RIGHT HAND SUBCUTANEOUS TISSUE AND FASCIA, OPEN APPROACH, DIAGNOSTIC: ICD-10-PCS | Performed by: ORTHOPAEDIC SURGERY

## 2018-05-07 PROCEDURE — 37000009 HC ANESTHESIA EA ADD 15 MINS: Performed by: ORTHOPAEDIC SURGERY

## 2018-05-07 PROCEDURE — 25000003 PHARM REV CODE 250: Performed by: ORTHOPAEDIC SURGERY

## 2018-05-07 PROCEDURE — D9220A PRA ANESTHESIA: Mod: ,,, | Performed by: ANESTHESIOLOGY

## 2018-05-07 PROCEDURE — 63600175 PHARM REV CODE 636 W HCPCS: Performed by: ORTHOPAEDIC SURGERY

## 2018-05-07 PROCEDURE — 27200651 HC AIRWAY, LMA: Performed by: NURSE ANESTHETIST, CERTIFIED REGISTERED

## 2018-05-07 PROCEDURE — 63600175 PHARM REV CODE 636 W HCPCS: Performed by: ANESTHESIOLOGY

## 2018-05-07 RX ORDER — MORPHINE SULFATE 4 MG/ML
2 INJECTION, SOLUTION INTRAMUSCULAR; INTRAVENOUS EVERY 5 MIN PRN
Status: DISCONTINUED | OUTPATIENT
Start: 2018-05-07 | End: 2018-05-10 | Stop reason: HOSPADM

## 2018-05-07 RX ORDER — GLYCOPYRROLATE 0.2 MG/ML
INJECTION INTRAMUSCULAR; INTRAVENOUS
Status: DISCONTINUED | OUTPATIENT
Start: 2018-05-07 | End: 2018-05-07

## 2018-05-07 RX ORDER — SODIUM CHLORIDE 9 MG/ML
INJECTION, SOLUTION INTRAVENOUS CONTINUOUS PRN
Status: DISCONTINUED | OUTPATIENT
Start: 2018-05-07 | End: 2018-05-07

## 2018-05-07 RX ORDER — OXYCODONE AND ACETAMINOPHEN 5; 325 MG/1; MG/1
1 TABLET ORAL
Status: DISCONTINUED | OUTPATIENT
Start: 2018-05-07 | End: 2018-05-10 | Stop reason: HOSPADM

## 2018-05-07 RX ORDER — SODIUM CHLORIDE 0.9 % (FLUSH) 0.9 %
3 SYRINGE (ML) INJECTION
Status: DISCONTINUED | OUTPATIENT
Start: 2018-05-07 | End: 2018-05-10 | Stop reason: HOSPADM

## 2018-05-07 RX ORDER — FENTANYL CITRATE 50 UG/ML
INJECTION, SOLUTION INTRAMUSCULAR; INTRAVENOUS
Status: DISCONTINUED | OUTPATIENT
Start: 2018-05-07 | End: 2018-05-07

## 2018-05-07 RX ORDER — CEFAZOLIN SODIUM 2 G/50ML
2 SOLUTION INTRAVENOUS
Status: COMPLETED | OUTPATIENT
Start: 2018-05-07 | End: 2018-05-08

## 2018-05-07 RX ORDER — HYDROMORPHONE HYDROCHLORIDE 2 MG/ML
0.2 INJECTION, SOLUTION INTRAMUSCULAR; INTRAVENOUS; SUBCUTANEOUS EVERY 5 MIN PRN
Status: DISCONTINUED | OUTPATIENT
Start: 2018-05-07 | End: 2018-05-10 | Stop reason: HOSPADM

## 2018-05-07 RX ORDER — METOCLOPRAMIDE HYDROCHLORIDE 5 MG/ML
10 INJECTION INTRAMUSCULAR; INTRAVENOUS EVERY 10 MIN PRN
Status: DISCONTINUED | OUTPATIENT
Start: 2018-05-07 | End: 2018-05-10 | Stop reason: HOSPADM

## 2018-05-07 RX ORDER — MEPERIDINE HYDROCHLORIDE 50 MG/ML
12.5 INJECTION INTRAMUSCULAR; INTRAVENOUS; SUBCUTANEOUS ONCE AS NEEDED
Status: ACTIVE | OUTPATIENT
Start: 2018-05-07 | End: 2018-05-07

## 2018-05-07 RX ORDER — FAMOTIDINE 10 MG/ML
INJECTION INTRAVENOUS
Status: DISCONTINUED | OUTPATIENT
Start: 2018-05-07 | End: 2018-05-07

## 2018-05-07 RX ORDER — LIDOCAINE HCL/PF 100 MG/5ML
SYRINGE (ML) INTRAVENOUS
Status: DISCONTINUED | OUTPATIENT
Start: 2018-05-07 | End: 2018-05-07

## 2018-05-07 RX ORDER — OXYCODONE HCL 10 MG/1
10 TABLET, FILM COATED, EXTENDED RELEASE ORAL EVERY 12 HOURS
Status: COMPLETED | OUTPATIENT
Start: 2018-05-07 | End: 2018-05-09

## 2018-05-07 RX ORDER — PROPOFOL 10 MG/ML
VIAL (ML) INTRAVENOUS
Status: DISCONTINUED | OUTPATIENT
Start: 2018-05-07 | End: 2018-05-07

## 2018-05-07 RX ORDER — MIDAZOLAM HYDROCHLORIDE 1 MG/ML
INJECTION, SOLUTION INTRAMUSCULAR; INTRAVENOUS
Status: DISCONTINUED | OUTPATIENT
Start: 2018-05-07 | End: 2018-05-07

## 2018-05-07 RX ORDER — METOCLOPRAMIDE HYDROCHLORIDE 5 MG/ML
INJECTION INTRAMUSCULAR; INTRAVENOUS
Status: DISCONTINUED | OUTPATIENT
Start: 2018-05-07 | End: 2018-05-07

## 2018-05-07 RX ADMIN — MIDAZOLAM HYDROCHLORIDE 2 MG: 1 INJECTION, SOLUTION INTRAMUSCULAR; INTRAVENOUS at 03:05

## 2018-05-07 RX ADMIN — HYDROMORPHONE HYDROCHLORIDE 0.2 MG: 2 INJECTION INTRAMUSCULAR; INTRAVENOUS; SUBCUTANEOUS at 05:05

## 2018-05-07 RX ADMIN — CLINDAMYCIN IN 5 PERCENT DEXTROSE 900 MG: 18 INJECTION, SOLUTION INTRAVENOUS at 01:05

## 2018-05-07 RX ADMIN — METOCLOPRAMIDE 10 MG: 5 INJECTION, SOLUTION INTRAMUSCULAR; INTRAVENOUS at 03:05

## 2018-05-07 RX ADMIN — LIDOCAINE HYDROCHLORIDE 100 MG: 20 INJECTION, SOLUTION INTRAVENOUS at 03:05

## 2018-05-07 RX ADMIN — MORPHINE SULFATE 4 MG: 10 INJECTION INTRAVENOUS at 08:05

## 2018-05-07 RX ADMIN — PROPOFOL 150 MG: 10 INJECTION, EMULSION INTRAVENOUS at 03:05

## 2018-05-07 RX ADMIN — CLINDAMYCIN IN 5 PERCENT DEXTROSE 900 MG: 18 INJECTION, SOLUTION INTRAVENOUS at 09:05

## 2018-05-07 RX ADMIN — GLYCOPYRROLATE 0.2 MG: 0.2 INJECTION, SOLUTION INTRAMUSCULAR; INTRAVENOUS at 03:05

## 2018-05-07 RX ADMIN — MORPHINE SULFATE 4 MG: 10 INJECTION INTRAVENOUS at 07:05

## 2018-05-07 RX ADMIN — DOCUSATE SODIUM AND SENNOSIDES 1 TABLET: 8.6; 5 TABLET, FILM COATED ORAL at 08:05

## 2018-05-07 RX ADMIN — HYDROMORPHONE HYDROCHLORIDE 0.2 MG: 2 INJECTION INTRAMUSCULAR; INTRAVENOUS; SUBCUTANEOUS at 04:05

## 2018-05-07 RX ADMIN — MORPHINE SULFATE 4 MG: 10 INJECTION INTRAVENOUS at 04:05

## 2018-05-07 RX ADMIN — OXYCODONE HYDROCHLORIDE 10 MG: 10 TABLET, FILM COATED, EXTENDED RELEASE ORAL at 08:05

## 2018-05-07 RX ADMIN — FAMOTIDINE 20 MG: 10 INJECTION, SOLUTION INTRAVENOUS at 03:05

## 2018-05-07 RX ADMIN — PROPOFOL 50 MG: 10 INJECTION, EMULSION INTRAVENOUS at 03:05

## 2018-05-07 RX ADMIN — MORPHINE SULFATE 4 MG: 10 INJECTION INTRAVENOUS at 12:05

## 2018-05-07 RX ADMIN — CEFAZOLIN SODIUM 2 G: 2 SOLUTION INTRAVENOUS at 07:05

## 2018-05-07 RX ADMIN — MORPHINE SULFATE 4 MG: 10 INJECTION INTRAVENOUS at 11:05

## 2018-05-07 RX ADMIN — CLINDAMYCIN IN 5 PERCENT DEXTROSE 900 MG: 18 INJECTION, SOLUTION INTRAVENOUS at 08:05

## 2018-05-07 RX ADMIN — SODIUM CHLORIDE: 0.9 INJECTION, SOLUTION INTRAVENOUS at 03:05

## 2018-05-07 RX ADMIN — FENTANYL CITRATE 50 MCG: 50 INJECTION INTRAMUSCULAR; INTRAVENOUS at 03:05

## 2018-05-07 RX ADMIN — INSULIN ASPART 2 UNITS: 100 INJECTION, SOLUTION INTRAVENOUS; SUBCUTANEOUS at 08:05

## 2018-05-07 NOTE — PROGRESS NOTES
"[facility] Hospital Medicine  Progress Note    Patient Name: Robel Desai  MRN: [id number]  Patient Class: IP- Inpatient   Admission Date: 5/5/2018  Length of Stay: 1 days  Attending Physician: Cori Dumont MD  Primary Care Provider: Ashish Greer        Subjective:     Principal Problem:Cellulitis of finger of right hand    HPI:  Mr. Robel Desai is a 37 y.o. man with DM who presents with swelling of R hand and forehead. States that he developed "pimples" on his forehead and R hand that "exploded" and started to drain and are now swollen and painful. Lesion on head started 1 week ago; lesion on finger started on Friday as a small "pimple" that gradually increased. He works installing insulation. 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 No pets at home. Denies bug bites. Denies IVDU. Denies fevers, chills, other lesions. Asking repeatedly for it to be lanced.     Hospital Course:  Given vanc in ED. Started on clindamycin for forehead and R hand cellulitis. Ortho consulted- I&D on 5/7.     Interval History: Used  by phone. Patient still complaining of R hand pain, asking for I&D    Review of Systems   Constitutional: Negative for chills and fever.   Respiratory: Negative for shortness of breath.    Cardiovascular: Negative for chest pain.   Gastrointestinal: Negative for abdominal pain.   Musculoskeletal: Positive for joint swelling (R hand and 4th finger).     Objective:     Vital Signs (Most Recent):  Temp: 98.9 °F (37.2 °C) (05/07/18 1134)  Pulse: 93 (05/07/18 1134)  Resp: 18 (05/07/18 1134)  BP: (!) 144/86 (05/07/18 1134)  SpO2: 96 % (05/07/18 1134) Vital Signs (24h Range):  Temp:  [98.3 °F (36.8 °C)-99.2 °F (37.3 °C)] 98.9 °F (37.2 °C)  Pulse:  [] 93  Resp:  [17-18] 18  SpO2:  [95 %-97 %] 96 %  BP: (129-144)/(73-86) 144/86     Weight: 99.3 kg (218 lb 14.7 oz)  Body mass index is 37.58 kg/m².    Intake/Output Summary (Last 24 hours) at 05/07/18 1412  Last data filed at 05/07/18 1252   " Gross per 24 hour   Intake              120 ml   Output                0 ml   Net              120 ml      Physical Exam   Constitutional: He is oriented to person, place, and time. He appears well-developed and well-nourished. No distress.   obese   HENT:   ~2cm open nodule draining small amount serosanguinous fluid on R side of forehead   Eyes: EOM are normal. No scleral icterus.   Cardiovascular: Normal rate, regular rhythm, normal heart sounds and intact distal pulses.  Exam reveals no friction rub.    No murmur heard.  Pulmonary/Chest: Effort normal and breath sounds normal. No respiratory distress. He has no wheezes. He has no rales.   Abdominal: Soft. Bowel sounds are normal. He exhibits distension. He exhibits no mass. There is no tenderness. There is no guarding.   Musculoskeletal: He exhibits no edema.   Neurological: He is alert and oriented to person, place, and time.   Skin: He is not diaphoretic.   R hand swelling, induration, erythema, warmth on dorsal surface   Vitals reviewed.      Significant Labs: All pertinent labs within the past 24 hours have been reviewed.    Significant Imaging: I have reviewed and interpreted all pertinent imaging results/findings within the past 24 hours.    Assessment/Plan:      * Cellulitis of finger of right hand    - presents with right forehead and right hand cellulitis. Does not meet sepsis criteria. Failed outpatient PCN  - started on clindamycin for presumed MRSA infection  - no abscess formation at this time  - XR R hand showing soft tissue swelling  - MRI pending  - blood cultures NGTD  - orthopaedics consulted- I&D 5/7  - continue clindamycin          MYLA (acute kidney injury)    Cr 1.8 on arrival from 1.2 in 2017  Given IVF in ED  Resolved, renal function now at baseline  Avoid nephrotoxins, renally dose all medications         Essential hypertension    - patient does not know home med, likely it is ACEi or ARB for DM and HTN  - intermittently HTN  - will  continue to monitor off Rx for now- BP generally controlled          Type 2 diabetes mellitus with hyperglycemia, with long-term current use of insulin    - A1c 9.3%  - patient states that he takes 10U insulin in AM and 40U in PM but is unclear what type  - ADA diet, accuchecks, hypoglycemic protocol  - glucose at goal with no insulin at this time  - will continue to monitor  - SSI  - start basal bolus if glucoses consistently >180          Cellulitis of head except face    - see above  - open draining wound  - continue to monitor  - continue clindamycin             VTE Risk Mitigation         Ordered     IP VTE HIGH RISK PATIENT  Once      05/06/18 0531     Place MATTHIAS hose  Until discontinued      05/06/18 0531              Cori Dumont MD  Department of Hospital Medicine   [facility]

## 2018-05-07 NOTE — ANESTHESIA PREPROCEDURE EVALUATION
05/07/2018  Robel Desai is a 37 y.o., male.    Anesthesia Evaluation    I have reviewed the Patient Summary Reports.     I have reviewed the Medications.     Review of Systems  Anesthesia Hx:  No problems with previous Anesthesia    Social:  Non-Smoker, Alcohol Use    Cardiovascular:   Hypertension    Renal/:   Chronic Renal Disease, CRI    Endocrine:   Diabetes, poorly controlled, type 2        Physical Exam  General:  Well nourished    Airway/Jaw/Neck:  Airway Findings: Mouth Opening: Normal Tongue: Normal  General Airway Assessment: Adult  Mallampati: II  TM Distance: Normal, at least 6 cm  Jaw/Neck Findings:  Neck ROM: Normal ROM      Dental:  Dental Findings: In tact   Chest/Lungs:  Chest/Lungs Findings: Clear to auscultation, Normal Respiratory Rate     Heart/Vascular:  Heart Findings: Rate: Normal        Mental Status:  Mental Status Findings:  Cooperative, Alert and Oriented         Anesthesia Plan  Type of Anesthesia, risks & benefits discussed:  Anesthesia Type:  general  Patient's Preference:   Intra-op Monitoring Plan: standard ASA monitors  Intra-op Monitoring Plan Comments:   Post Op Pain Control Plan: multimodal analgesia, IV/PO Opioids PRN and per primary service following discharge from PACU  Post Op Pain Control Plan Comments:   Induction:   IV  Beta Blocker:  Patient is not currently on a Beta-Blocker (No further documentation required).       Informed Consent: Patient understands risks and agrees with Anesthesia plan.  Questions answered. Anesthesia consent signed with patient.  ASA Score: 3     Day of Surgery Review of History & Physical:    H&P update referred to the surgeon.         Ready For Surgery From Anesthesia Perspective.

## 2018-05-07 NOTE — PLAN OF CARE
Problem: Patient Care Overview  Goal: Plan of Care Review  Outcome: Ongoing (interventions implemented as appropriate)  Pt AAOx4; wife at bedside; language line used to interpret care; right hand has an ice pack elevated on pillow; pain managed with meds; remains NPO since midnight; pt showered; AC and HS accuchecks; abx used to for swelling in right hand; TEDs on; will continue to monitor

## 2018-05-07 NOTE — NURSING
Patient back in room s/p I & D Right hand covered with ace wrap C/D/I, elevated  And covered with ice pack.  Patient wakes up to answer appropriately then falls back to sleep. Wife at bedside.

## 2018-05-07 NOTE — HOSPITAL COURSE
Given vanc in ED. Started on clindamycin for forehead and R hand cellulitis. Ortho consulted- I&D on 5/7.     Wound cultures pending. Per language line translation, pain controlled. Pt moving right hand, good pulses. Once cultures result and wound care to assess, can dc home with oral antibiotic.   5/9- wound growing Staph, species to result tomorrow per lab, cont clindamycin and daily dressing changes per ortho   5/10- patient adamant about leaving; lab reports that they had to repeat sensitivities on wound culture, + staph growing. Improved with clindamycin and plan to dc home on oral clindamycin x 7 days. Patient left a family member number to call (speaks English) if antibiotic needs to be changed when final culture results tomorrow. This was translated using language line. Wound care instructions given on dc home. Follow up appt at [facility].

## 2018-05-07 NOTE — PLAN OF CARE
"SW met with pt using language line at bedside. SW explained her role in Care Management. Pt voiced understanding. SW inquired about HELP AT HOME. Pt stated that he will have Wife Annemarie at home to help for support. SW voiced understanding. SW inquired about responsibilities when it comes to  MANAGING HER/HIS HEALTH at home and what it entails. Pt inquired about details. SW informed pt of RESPONSIBILITIES of:    1. Follow up appointments  2. Getting Prescriptions filled  3. Taking medications as prescribed.     Pt voiced understanding.  SW explained "My Health Packet" blue folder and the pink and green tabs that are on the folder as well. Discharge Brochure given to pt with Care Team information. Pt voiced understanding.     Pt's pharmacy:     Pt's preference for appointments: Pt states that he has an appointment on June 5th at 5:20pm.     Barriers to discharge:  Pt has no insurance and will need medications that are not expensive. Wants to be screen for Medicaid.     Discharge Goal: Home     Help at home: Wife Annemarie       05/07/18 1153   Discharge Assessment   Assessment Type Discharge Planning Assessment   Confirmed/corrected address and phone number on facesheet? Yes   Assessment information obtained from? Patient  (ATT language line Yen # [id number])   Prior to hospitilization cognitive status: Alert/Oriented   Prior to hospitalization functional status: Independent   Current cognitive status: Alert/Oriented   Current Functional Status: Independent   Lives With child(onelia), dependent;spouse   Able to Return to Prior Arrangements yes   Is patient able to care for self after discharge? Yes   Who are your caregiver(s) and their phone number(s)? Namahogany, spouse, [phone]   Patient's perception of discharge disposition home or selfcare   Readmission Within The Last 30 Days no previous admission in last 30 days   Equipment Currently Used at Home none   Do you have any problems affording any of your prescribed " medications? No   Is the patient taking medications as prescribed? yes   Does the patient have transportation home? Yes   Transportation Available car;family or friend will provide   Discharge Plan A Home with family   Discharge Plan B Home with family   Patient/Family In Agreement With Plan yes

## 2018-05-07 NOTE — ANESTHESIA POSTPROCEDURE EVALUATION
"Anesthesia Post Evaluation    Patient: Robel Desai    Procedure(s) Performed: Procedure(s) (LRB):  INCISION AND DRAINAGE (I & D)-HAND (Right)    Final Anesthesia Type: general  Patient location during evaluation: PACU  Patient participation: Yes- Able to Participate  Level of consciousness: awake and alert, oriented and awake  Post-procedure vital signs: reviewed and stable  Pain management: adequate  Airway patency: patent  PONV status at discharge: No PONV  Anesthetic complications: no      Cardiovascular status: blood pressure returned to baseline, hemodynamically stable and stable  Respiratory status: unassisted and spontaneous ventilation  Hydration status: euvolemic  Follow-up not needed.        Visit Vitals  BP (!) 142/82   Pulse 100   Temp 37 °C (98.6 °F) (Oral)   Resp (!) 21   Ht 5' 4" (1.626 m)   Wt 99.3 kg (218 lb 14.7 oz)   SpO2 95%   BMI 37.58 kg/m²       Pain/Noe Score: Pain Assessment Performed: Yes (5/7/2018  4:04 PM)  Presence of Pain: non-verbal indicators absent (5/7/2018  4:04 PM)  Pain Rating Prior to Med Admin: 8 (5/7/2018  4:58 PM)  Pain Rating Post Med Admin: 5 (5/7/2018 10:00 AM)  Noe Score: 4 (5/7/2018  4:04 PM)      "

## 2018-05-07 NOTE — BRIEF OP NOTE
[facility]  Brief Operative Note    SUMMARY     Surgery Date: 5/7/2018     Surgeon(s) and Role:     * Patrick Owusu MD - Primary    Assisting Surgeon: None    Pre-op Diagnosis:  Cellulitis of hand, right [L03.113]    Post-op Diagnosis:  Post-Op Diagnosis Codes:     * Cellulitis of hand, right [L03.113]    Procedure(s) (LRB):  INCISION AND DRAINAGE (I & D)-HAND (Right)    Anesthesia: Choice    Description of Procedure: Incision and drainage right hand abscess deep    Description of the findings of the procedure: Incision and drainage right hand abscess deep    Estimated Blood Loss: * No values recorded between 5/7/2018  3:35 PM and 5/7/2018  4:03 PM *       20 cc  Specimens:   Specimen (12h ago through future)    None

## 2018-05-07 NOTE — SUBJECTIVE & OBJECTIVE
Interval History: Used  by phone. Patient still complaining of R hand pain, asking for I&D    Review of Systems   Constitutional: Negative for chills and fever.   Respiratory: Negative for shortness of breath.    Cardiovascular: Negative for chest pain.   Gastrointestinal: Negative for abdominal pain.   Musculoskeletal: Positive for joint swelling (R hand and 4th finger).     Objective:     Vital Signs (Most Recent):  Temp: 98.9 °F (37.2 °C) (05/07/18 1134)  Pulse: 93 (05/07/18 1134)  Resp: 18 (05/07/18 1134)  BP: (!) 144/86 (05/07/18 1134)  SpO2: 96 % (05/07/18 1134) Vital Signs (24h Range):  Temp:  [98.3 °F (36.8 °C)-99.2 °F (37.3 °C)] 98.9 °F (37.2 °C)  Pulse:  [] 93  Resp:  [17-18] 18  SpO2:  [95 %-97 %] 96 %  BP: (129-144)/(73-86) 144/86     Weight: 99.3 kg (218 lb 14.7 oz)  Body mass index is 37.58 kg/m².    Intake/Output Summary (Last 24 hours) at 05/07/18 1412  Last data filed at 05/07/18 1252   Gross per 24 hour   Intake              120 ml   Output                0 ml   Net              120 ml      Physical Exam   Constitutional: He is oriented to person, place, and time. He appears well-developed and well-nourished. No distress.   obese   HENT:   ~2cm open nodule draining small amount serosanguinous fluid on R side of forehead   Eyes: EOM are normal. No scleral icterus.   Cardiovascular: Normal rate, regular rhythm, normal heart sounds and intact distal pulses.  Exam reveals no friction rub.    No murmur heard.  Pulmonary/Chest: Effort normal and breath sounds normal. No respiratory distress. He has no wheezes. He has no rales.   Abdominal: Soft. Bowel sounds are normal. He exhibits distension. He exhibits no mass. There is no tenderness. There is no guarding.   Musculoskeletal: He exhibits no edema.   Neurological: He is alert and oriented to person, place, and time.   Skin: He is not diaphoretic.   R hand swelling, induration, erythema, warmth on dorsal surface   Vitals  reviewed.      Significant Labs: All pertinent labs within the past 24 hours have been reviewed.    Significant Imaging: I have reviewed and interpreted all pertinent imaging results/findings within the past 24 hours.

## 2018-05-07 NOTE — PROGRESS NOTES
"Ortho Daily Progress Note    Robel Desai is a 37 y.o. male admitted on 5/5/2018      Chief Complaint/Reason for admission: Hand Swelling (Pt presents with swelling to right hand x3 days, reports started out as a staph infection and when it "popped" his hand began to swell. Pt also presents with staph infection to right forehead x7 days)       Hospital Day: 1  Post Op Day: * No surgery date entered *     The patient was seen and examined this morning at the bedside.   Pt reports continued pain and swelling that is worsening tot he right dorsum of the hand  _______________    Vitals:    05/06/18 1944 05/06/18 2357 05/07/18 0325 05/07/18 0715   BP: (!) 140/82 130/75 129/80 135/73   Pulse: 95 100 94 92   Resp: 17 17 17 18   Temp: 99 °F (37.2 °C) 99.2 °F (37.3 °C) 98.6 °F (37 °C) 99 °F (37.2 °C)   TempSrc: Oral Oral Oral Oral   SpO2: 97% 95% 96% 95%   Weight:       Height:           Vital Signs (Most Recent)  Temp: 99 °F (37.2 °C) (05/07/18 0715)  Pulse: 92 (05/07/18 0715)  Resp: 18 (05/07/18 0715)  BP: 135/73 (05/07/18 0715)  SpO2: 95 % (05/07/18 0715)    Vital Signs Range (Last 24H):  Temp:  [98.3 °F (36.8 °C)-99.2 °F (37.3 °C)]   Pulse:  []   Resp:  [17-18]   BP: (129-142)/(71-82)   SpO2:  [95 %-100 %]       Physical:    AAOx3    Right hand dorsal swelling with pain especially overt the 4th finger.    NVI Distally  Palpable distal pulses  CR<3sec      Recent Labs      05/06/18   0115  05/07/18   0357   K  4.1  4.2   CALCIUM  10.0  9.2   WBC  12.18  10.75   HGB  11.1*  11.9*   HCT  32.3*  35.4*   PLT  262  288       I/O last 3 completed shifts:  In: 360 [P.O.:360]  Out: -           Assessment:  A/P     Right hand dorsal abscess           Plan:   -plan for right hand I and D of dorsal abscess today.  Continue IV abx       Patrick Owusu MD  [facility]  "

## 2018-05-07 NOTE — NURSING
Patient had surgical bath, linen changed and voided. Patient going to surgery via stretcher with transport. No distress noted.

## 2018-05-07 NOTE — TRANSFER OF CARE
"Anesthesia Transfer of Care Note    Patient: Robel Desai    Procedure(s) Performed: Procedure(s) (LRB):  INCISION AND DRAINAGE (I & D)-HAND (Right)    Patient location: PACU    Anesthesia Type: general    Transport from OR: Transported from OR on room air with adequate spontaneous ventilation    Post pain: adequate analgesia    Post assessment: no apparent anesthetic complications    Post vital signs: stable    Level of consciousness: responds to stimulation    Nausea/Vomiting: no nausea/vomiting    Complications: none    Transfer of care protocol was followedComments: 8.0 NA to right nare with good exchange      Last vitals:   Visit Vitals  BP (!) 88/47   Pulse 88   Temp 37 °C (98.6 °F) (Oral)   Resp 16   Ht 5' 4" (1.626 m)   Wt 99.3 kg (218 lb 14.7 oz)   SpO2 97%   BMI 37.58 kg/m²     "

## 2018-05-08 PROBLEM — L03.113 CELLULITIS OF RIGHT HAND: Status: ACTIVE | Noted: 2018-05-08

## 2018-05-08 LAB
ANION GAP SERPL CALC-SCNC: 9 MMOL/L
BUN SERPL-MCNC: 18 MG/DL
CALCIUM SERPL-MCNC: 9.3 MG/DL
CHLORIDE SERPL-SCNC: 103 MMOL/L
CO2 SERPL-SCNC: 23 MMOL/L
CREAT SERPL-MCNC: 1.4 MG/DL
EST. GFR  (AFRICAN AMERICAN): >60 ML/MIN/1.73 M^2
EST. GFR  (NON AFRICAN AMERICAN): >60 ML/MIN/1.73 M^2
GLUCOSE SERPL-MCNC: 217 MG/DL
GRAM STN SPEC: NORMAL
GRAM STN SPEC: NORMAL
POCT GLUCOSE: 159 MG/DL (ref 70–110)
POCT GLUCOSE: 177 MG/DL (ref 70–110)
POCT GLUCOSE: 181 MG/DL (ref 70–110)
POCT GLUCOSE: 221 MG/DL (ref 70–110)
POTASSIUM SERPL-SCNC: 4.6 MMOL/L
SODIUM SERPL-SCNC: 135 MMOL/L

## 2018-05-08 PROCEDURE — 25000003 PHARM REV CODE 250: Performed by: NURSE PRACTITIONER

## 2018-05-08 PROCEDURE — 36415 COLL VENOUS BLD VENIPUNCTURE: CPT

## 2018-05-08 PROCEDURE — 94761 N-INVAS EAR/PLS OXIMETRY MLT: CPT

## 2018-05-08 PROCEDURE — 63600175 PHARM REV CODE 636 W HCPCS: Performed by: ORTHOPAEDIC SURGERY

## 2018-05-08 PROCEDURE — 11000001 HC ACUTE MED/SURG PRIVATE ROOM

## 2018-05-08 PROCEDURE — 25000003 PHARM REV CODE 250: Performed by: ORTHOPAEDIC SURGERY

## 2018-05-08 PROCEDURE — 63600175 PHARM REV CODE 636 W HCPCS: Performed by: HOSPITALIST

## 2018-05-08 PROCEDURE — 63600175 PHARM REV CODE 636 W HCPCS: Performed by: NURSE PRACTITIONER

## 2018-05-08 PROCEDURE — S0077 INJECTION, CLINDAMYCIN PHOSP: HCPCS | Performed by: NURSE PRACTITIONER

## 2018-05-08 PROCEDURE — 80048 BASIC METABOLIC PNL TOTAL CA: CPT

## 2018-05-08 RX ORDER — INSULIN ASPART 100 [IU]/ML
15 INJECTION, SUSPENSION SUBCUTANEOUS 2 TIMES DAILY WITH MEALS
Status: DISCONTINUED | OUTPATIENT
Start: 2018-05-08 | End: 2018-05-10 | Stop reason: HOSPADM

## 2018-05-08 RX ADMIN — OXYCODONE HYDROCHLORIDE 10 MG: 10 TABLET, FILM COATED, EXTENDED RELEASE ORAL at 08:05

## 2018-05-08 RX ADMIN — CLINDAMYCIN IN 5 PERCENT DEXTROSE 900 MG: 18 INJECTION, SOLUTION INTRAVENOUS at 09:05

## 2018-05-08 RX ADMIN — INSULIN ASPART 15 UNITS: 100 INJECTION, SUSPENSION SUBCUTANEOUS at 05:05

## 2018-05-08 RX ADMIN — INSULIN ASPART 4 UNITS: 100 INJECTION, SOLUTION INTRAVENOUS; SUBCUTANEOUS at 08:05

## 2018-05-08 RX ADMIN — MORPHINE SULFATE 4 MG: 10 INJECTION INTRAVENOUS at 07:05

## 2018-05-08 RX ADMIN — INSULIN ASPART 1 UNITS: 100 INJECTION, SOLUTION INTRAVENOUS; SUBCUTANEOUS at 08:05

## 2018-05-08 RX ADMIN — DOCUSATE SODIUM AND SENNOSIDES 1 TABLET: 8.6; 5 TABLET, FILM COATED ORAL at 08:05

## 2018-05-08 RX ADMIN — CEFAZOLIN SODIUM 2 G: 2 SOLUTION INTRAVENOUS at 01:05

## 2018-05-08 RX ADMIN — CLINDAMYCIN IN 5 PERCENT DEXTROSE 900 MG: 18 INJECTION, SOLUTION INTRAVENOUS at 05:05

## 2018-05-08 RX ADMIN — INSULIN ASPART 4 UNITS: 100 INJECTION, SOLUTION INTRAVENOUS; SUBCUTANEOUS at 12:05

## 2018-05-08 RX ADMIN — CLINDAMYCIN IN 5 PERCENT DEXTROSE 900 MG: 18 INJECTION, SOLUTION INTRAVENOUS at 02:05

## 2018-05-08 RX ADMIN — MORPHINE SULFATE 4 MG: 10 INJECTION INTRAVENOUS at 02:05

## 2018-05-08 NOTE — PROGRESS NOTES
"[facility] Hospital Medicine  Progress Note    Patient Name: Robel Desai  MRN: [id number]  Patient Class: IP- Inpatient   Admission Date: 5/5/2018  Length of Stay: 2 days  Attending Physician: Christy Medrano MD  Primary Care Provider: Ashish Greer        Subjective:     Principal Problem:Cellulitis of finger of right hand    HPI:  Mr. Robel Desai is a 37 y.o. man with DM who presents with swelling of R hand and forehead. States that he developed "pimples" on his forehead and R hand that "exploded" and started to drain and are now swollen and painful. Lesion on head started 1 week ago; lesion on finger started on Friday as a small "pimple" that gradually increased. He works installing insulation. 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 No pets at home. Denies bug bites. Denies IVDU. Denies fevers, chills, other lesions. Asking repeatedly for it to be lanced.     Hospital Course:  Given vanc in ED. Started on clindamycin for forehead and R hand cellulitis. Ortho consulted- I&D on 5/7.     Wound cultures pending. Per language line translation, pain controlled. Pt moving right hand, good pulses. Once cultures result and wound care to assess, can dc home with oral antibiotic.     Interval History: language line  used, pain controlled     Review of Systems   Constitutional: Negative for chills and fever.   Respiratory: Negative for shortness of breath.    Cardiovascular: Negative for chest pain.   Gastrointestinal: Negative for abdominal pain.   Musculoskeletal: Positive for joint swelling (R hand and 4th finger).     Objective:     Vital Signs (Most Recent):  Temp: 99.4 °F (37.4 °C) (05/08/18 0806)  Pulse: 97 (05/08/18 0806)  Resp: 18 (05/08/18 0806)  BP: (!) 141/81 (05/08/18 0806)  SpO2: 95 % (05/08/18 0806) Vital Signs (24h Range):  Temp:  [98 °F (36.7 °C)-99.4 °F (37.4 °C)] 99.4 °F (37.4 °C)  Pulse:  [] 97  Resp:  [16-25] 18  SpO2:  [92 %-99 %] 95 %  BP: ()/(47-87) 141/81 "     Weight: 99.3 kg (218 lb 14.7 oz)  Body mass index is 37.58 kg/m².    Intake/Output Summary (Last 24 hours) at 05/08/18 1102  Last data filed at 05/08/18 0911   Gross per 24 hour   Intake             3030 ml   Output             1400 ml   Net             1630 ml      Physical Exam   Constitutional: He is oriented to person, place, and time. He appears well-developed and well-nourished. No distress.   obese   HENT:   ~2cm open nodule draining small amount serosanguinous fluid on R side of forehead   Eyes: EOM are normal. No scleral icterus.   Cardiovascular: Normal rate, regular rhythm, normal heart sounds and intact distal pulses.  Exam reveals no friction rub.    No murmur heard.  Pulmonary/Chest: Effort normal and breath sounds normal. No respiratory distress. He has no wheezes. He has no rales.   Abdominal: Soft. Bowel sounds are normal. He exhibits distension. He exhibits no mass. There is no tenderness. There is no guarding.   Musculoskeletal: He exhibits no edema.   Neurological: He is alert and oriented to person, place, and time.   Skin: He is not diaphoretic.   R hand swelling, induration, erythema, warmth on dorsal surface   Vitals reviewed.      Significant Labs:   Blood Culture: No results for input(s): LABBLOO in the last 48 hours.  BMP:   Recent Labs  Lab 05/08/18  0427   *   *   K 4.6      CO2 23   BUN 18   CREATININE 1.4   CALCIUM 9.3     CBC:   Recent Labs  Lab 05/07/18  0357   WBC 10.75   HGB 11.9*   HCT 35.4*        POCT Glucose:   Recent Labs  Lab 05/07/18  1807 05/07/18 2015 05/08/18  0800   POCTGLUCOSE 150* 243* 221*       Significant Imaging: I have reviewed all pertinent imaging results/findings within the past 24 hours.    Assessment/Plan:      * Cellulitis of finger of right hand    - presents with right forehead and right hand cellulitis. Does not meet sepsis criteria. Failed outpatient PCN  - started on clindamycin for presumed MRSA infection  - no abscess  formation at this time  - XR R hand showing soft tissue swelling  - blood cultures NGTD  - orthopaedics consulted- I&D 5/7  - continue clindamycin          MYLA (acute kidney injury)    Cr 1.8 on arrival from 1.2 in 2017  Given IVF in ED  Resolved, renal function now at baseline  Avoid nephrotoxins, renally dose all medications         Essential hypertension    - patient does not know home med, likely it is ACEi or ARB for DM and HTN  - intermittently HTN  - will continue to monitor off Rx for now- BP generally controlled          Type 2 diabetes mellitus with hyperglycemia, with long-term current use of insulin    - A1c 9.3%  - patient states that he takes 10U insulin in AM and 40U in PM but is unclear what type  - ADA diet, accuchecks, hypoglycemic protocol  - glucose at goal with no insulin at this time  - will continue to monitor  - SSI  - start basal bolus if glucoses consistently >180          Cellulitis of head except face    - see above  - open draining wound  - continue to monitor  - continue clindamycin             VTE Risk Mitigation         Ordered     IP VTE HIGH RISK PATIENT  Once      05/07/18 1806     Place MATTHAIS hose  Until discontinued      05/07/18 1806     Place MATTHIAS hose  Until discontinued      05/06/18 0531              Christy Medrano MD  Department of Hospital Medicine   [facility]

## 2018-05-08 NOTE — PLAN OF CARE
Problem: Fall Risk (Adult)  Goal: Absence of Falls  Patient will demonstrate the desired outcomes by discharge/transition of care.   Outcome: Ongoing (interventions implemented as appropriate)   05/08/18 0331   Fall Risk (Adult)   Absence of Falls making progress toward outcome       Problem: Patient Care Overview  Goal: Plan of Care Review  Outcome: Ongoing (interventions implemented as appropriate)   05/08/18 0331   Coping/Psychosocial   Plan Of Care Reviewed With patient      05/08/18 0331   Coping/Psychosocial   Plan Of Care Reviewed With patient       Problem: Diabetes, Type 2 (Adult)  Goal: Signs and Symptoms of Listed Potential Problems Will be Absent, Minimized or Managed (Diabetes, Type 2)  Signs and symptoms of listed potential problems will be absent, minimized or managed by discharge/transition of care (reference Diabetes, Type 2 (Adult) CPG).   Outcome: Ongoing (interventions implemented as appropriate)   05/08/18 0331   Diabetes, Type 2   Problems Assessed (Type 2 Diabetes) hyperglycemia   Problems Present (Type 2 Diabetes) hyperglycemia       Problem: Skin and Soft Tissue Infection (Adult)  Goal: Signs and Symptoms of Listed Potential Problems Will be Absent, Minimized or Managed (Skin and Soft Tissue Infection)  Signs and symptoms of listed potential problems will be absent, minimized or managed by discharge/transition of care (reference Skin and Soft Tissue Infection (Adult) CPG).   Outcome: Ongoing (interventions implemented as appropriate)   05/08/18 0331   Skin and Soft Tissue Infection   Problems Assessed (Skin and Soft Tissue Infection) all   Problems Present (Skin and Soft Tissue Infection) none

## 2018-05-08 NOTE — NURSING
Report given.  C/o right hand pain.  Medicated per orders.  Right hand remains on pillow and blankets.  Ice pack to right hand. Report given to am nurse.

## 2018-05-08 NOTE — NURSING
Called language line/ID# [id number] to discuss plan of care with patient. ( Patient understands some English and I speak some [place] patients wife at bedside also understands English)

## 2018-05-08 NOTE — PLAN OF CARE
Problem: Occupational Therapy Goal  Goal: Occupational Therapy Goal  Outcome: Outcome(s) achieved Date Met: 05/08/18  The patient was educated re: need to perform  AROM to the right hand during dressing changes and elevation of the right hand for edema management. The patient verbalized understanding via language line .

## 2018-05-08 NOTE — OP NOTE
DATE OF PROCEDURE:  05/07/2018    COMPLICATIONS:  None.    IMPLANTS:  None.    SURGEON:  Patrick Owusu M.D.    ASSISTANT:  None.    PREOPERATIVE DIAGNOSIS:  Right hand abscess.    POSTOPERATIVE DIAGNOSIS:  Right hand abscess.    PROCEDURE PERFORMED:  1.  Right hand incision and drainage of abscess, deep.  2.  Debridement of tissue, right hand including fascia and subcutaneous tissue.    FINDINGS:  Deep space abscess to the right hand.    HISTORY OF PRESENT ILLNESS:  The patient is a 37-year-old male who had a small   abrasion to the right hand at the dorsum of the fourth finger.  This went on to   develop an infection and abscess deep in the webspace of the third and fourth   finger as well as within the dorsum of the fourth finger.    PROCEDURE IN DETAIL: After appropriate consents were signed, which included   discussion of possible risks and complications, which included, but not limited   to wound healing complications as well as skin breakdown, infection as well as   injury to nerves, vessels, soft tissue in the area as well as DVT, pulmonary   embolism, myocardial infarction, stroke and death.  The patient understood the   possible risks and complications and wished to proceed with the procedure.    After the consents were signed, the patient was brought to the Operating Room,   kept in a supine position, transferred to the operative table.  All bony   prominences and neurovascular structures were well padded.  IV antibiotics have   been infused.  A timeout was called through the scrub tech, the anesthesia   staff, circulating nurse as well as surgical staff.  A tourniquet was placed at   the proximal arm; however, not inflated.  Incision was made directly over the   abrasion wound to the dorsum of the fourth finger, there was immediately pus   present in this area.  This was sent for culture.  We extended our incision up   into the hand.  There was pus present here as well.  We followed, it tracked    volarly to a certain degree into the space between the third and the fourth   finger.  This was expressed and washed very well.  All adhesions were broken up   and we gained excellent access to the palmar aspect as well.  We debrided the   dead tissue from this area.  Care was taken not to injure the digital nerve   artery.  We washed over 3 liters of saline through the wound.  We then closed   the proximal and distal ends of the wound loosely and packed the entire palmar   space, hand and finger.  Sterile dressing applied to the wound.  The patient was   awoken from anesthesia without complication.  All counts were correct.    POSTOPERATIVE CARE:  The patient will remain in the hospital, IV antibiotics.    We will change the packing tomorrow.      ND/ELKIN  dd: 05/07/2018 16:14:17 (CDT)  td: 05/07/2018 21:15:24 (CDT)  Doc ID   #[id number]  Job ID #[id number]    CC:

## 2018-05-08 NOTE — SUBJECTIVE & OBJECTIVE
Interval History: language line  used, pain controlled     Review of Systems   Constitutional: Negative for chills and fever.   Respiratory: Negative for shortness of breath.    Cardiovascular: Negative for chest pain.   Gastrointestinal: Negative for abdominal pain.   Musculoskeletal: Positive for joint swelling (R hand and 4th finger).     Objective:     Vital Signs (Most Recent):  Temp: 99.4 °F (37.4 °C) (05/08/18 0806)  Pulse: 97 (05/08/18 0806)  Resp: 18 (05/08/18 0806)  BP: (!) 141/81 (05/08/18 0806)  SpO2: 95 % (05/08/18 0806) Vital Signs (24h Range):  Temp:  [98 °F (36.7 °C)-99.4 °F (37.4 °C)] 99.4 °F (37.4 °C)  Pulse:  [] 97  Resp:  [16-25] 18  SpO2:  [92 %-99 %] 95 %  BP: ()/(47-87) 141/81     Weight: 99.3 kg (218 lb 14.7 oz)  Body mass index is 37.58 kg/m².    Intake/Output Summary (Last 24 hours) at 05/08/18 1102  Last data filed at 05/08/18 0911   Gross per 24 hour   Intake             3030 ml   Output             1400 ml   Net             1630 ml      Physical Exam   Constitutional: He is oriented to person, place, and time. He appears well-developed and well-nourished. No distress.   obese   HENT:   ~2cm open nodule draining small amount serosanguinous fluid on R side of forehead   Eyes: EOM are normal. No scleral icterus.   Cardiovascular: Normal rate, regular rhythm, normal heart sounds and intact distal pulses.  Exam reveals no friction rub.    No murmur heard.  Pulmonary/Chest: Effort normal and breath sounds normal. No respiratory distress. He has no wheezes. He has no rales.   Abdominal: Soft. Bowel sounds are normal. He exhibits distension. He exhibits no mass. There is no tenderness. There is no guarding.   Musculoskeletal: He exhibits no edema.   Neurological: He is alert and oriented to person, place, and time.   Skin: He is not diaphoretic.   R hand swelling, induration, erythema, warmth on dorsal surface   Vitals reviewed.      Significant Labs:   Blood Culture: No  results for input(s): LABBLOO in the last 48 hours.  BMP:   Recent Labs  Lab 05/08/18  0427   *   *   K 4.6      CO2 23   BUN 18   CREATININE 1.4   CALCIUM 9.3     CBC:   Recent Labs  Lab 05/07/18  0357   WBC 10.75   HGB 11.9*   HCT 35.4*        POCT Glucose:   Recent Labs  Lab 05/07/18  1807 05/07/18 2015 05/08/18  0800   POCTGLUCOSE 150* 243* 221*       Significant Imaging: I have reviewed all pertinent imaging results/findings within the past 24 hours.

## 2018-05-08 NOTE — PROGRESS NOTES
"Ortho Daily Progress Note    Robel Desai is a 37 y.o. male admitted on 5/5/2018      Chief Complaint/Reason for admission: Hand Swelling (Pt presents with swelling to right hand x3 days, reports started out as a staph infection and when it "popped" his hand began to swell. Pt also presents with staph infection to right forehead x7 days)       Hospital Day: 2  Post Op Day: 1 Day Post-Op     The patient was seen and examined this morning at the bedside. Patient reports no acute issues overnight.  Patient reports that pain is adequately controlled.    _______________    Vitals:    05/07/18 2303 05/08/18 0459 05/08/18 0700 05/08/18 0806   BP: (!) 150/82 130/77  (!) 141/81   Pulse: 101 100 98 97   Resp: 18 18 17 18   Temp: 98.6 °F (37 °C) 98 °F (36.7 °C)  99.4 °F (37.4 °C)   TempSrc: Oral Oral  Oral   SpO2: 95% (!) 94% 95% 95%   Weight:       Height:           Vital Signs (Most Recent)  Temp: 99.4 °F (37.4 °C) (05/08/18 0806)  Pulse: 97 (05/08/18 0806)  Resp: 18 (05/08/18 0806)  BP: (!) 141/81 (05/08/18 0806)  SpO2: 95 % (05/08/18 0806)    Vital Signs Range (Last 24H):  Temp:  [98 °F (36.7 °C)-99.4 °F (37.4 °C)]   Pulse:  []   Resp:  [16-25]   BP: ()/(47-87)   SpO2:  [92 %-99 %]       Physical:    AAOx3  Incision/ dressing clean/dry/intact  packing to dorsum of hand removed  NVI Distally  Palpable distal pulses  CR<3sec      Recent Labs      05/06/18   0115  05/07/18   0357  05/08/18   0427   K  4.1  4.2  4.6   CALCIUM  10.0  9.2  9.3   WBC  12.18  10.75   --    HGB  11.1*  11.9*   --    HCT  32.3*  35.4*   --    PLT  262  288   --        I/O last 3 completed shifts:  In: 2960 [I.V.:2250; IV Piggyback:710]  Out: 1500 [Urine:1500]          Assessment:  A/P POD 1 s/p right   Hand I and D            Plan:    PT/OT: Hand ROM   Pain Control  IV abx  F/u cx  Packing removed today    Please have wound care to repack and teach.(already consulted)    Discharge Planning        Patrick Owusu MD  [facility]

## 2018-05-08 NOTE — PLAN OF CARE
Problem: Patient Care Overview  Goal: Plan of Care Review  Outcome: Ongoing (interventions implemented as appropriate)  Continue with blood sugar checks ac/hs. Keep ice pack on right hand and elevated on 2 pillows. Dressing dry and intact. Continue with IVAB as ordered. Adequate pain control with present pain medicine.

## 2018-05-08 NOTE — CONSULTS
"A 37 year old male admitted 5/5/18 from home with cellulitis of finger of right hand; MYLA; essential hypertension; DM II with hyperglycemia; cellulitis of head  PMH: DM; HTN  5/7 WBC 10.75 Hgb 11.9 Hct 35.4   5/6 Alb 3.4 A1C 9.3  5/7 I&D deep abscess right hand and debridement of fascia and subcutaneous tissue right hand per Dr. Owusu  Abscess deep in webspace of right 3rd and 4th fingers as well as dorsum of 4th finger  Packing removed per Ortho this am with plans to repack today and teach patient/caregiver wound care  Assessment:  Photodocumentation    Right dorsal hand- Sutures in place with 2 cm opening identified. Edges of incision cleansed and wound drained ~ 1 cc serosangineous fluid. Dorsal hand edematous. Unable to fully flex fingers due to edema.  OT in room during assessment of wound and instructions for care of incision.  services used.   Treatment:  Instructed patient to cleanse hand with NS. Placed 3.5 cm wick of Aquacel Ag in opening between sutures using tweezers. Placed dry gauze in webs and covered incision with dry gauze. Wrapped hand with 4" Coban.   Instructed patient to keep hand elevated to treat/manage edema. Also instructed on importance of BG management for wound healing and treatment of infection. Instructed to exercise fingers when dressing changed daily. To gently flex fingers as much as possible.  Patient denied having additional questions at end of session.         "

## 2018-05-09 LAB
ANION GAP SERPL CALC-SCNC: 8 MMOL/L
BUN SERPL-MCNC: 21 MG/DL
CALCIUM SERPL-MCNC: 9.3 MG/DL
CHLORIDE SERPL-SCNC: 103 MMOL/L
CO2 SERPL-SCNC: 25 MMOL/L
CREAT SERPL-MCNC: 1.4 MG/DL
EST. GFR  (AFRICAN AMERICAN): >60 ML/MIN/1.73 M^2
EST. GFR  (NON AFRICAN AMERICAN): >60 ML/MIN/1.73 M^2
GLUCOSE SERPL-MCNC: 185 MG/DL
POCT GLUCOSE: 144 MG/DL (ref 70–110)
POCT GLUCOSE: 206 MG/DL (ref 70–110)
POCT GLUCOSE: 218 MG/DL (ref 70–110)
POCT GLUCOSE: 219 MG/DL (ref 70–110)
POCT GLUCOSE: 231 MG/DL (ref 70–110)
POTASSIUM SERPL-SCNC: 4.4 MMOL/L
SODIUM SERPL-SCNC: 136 MMOL/L

## 2018-05-09 PROCEDURE — 80048 BASIC METABOLIC PNL TOTAL CA: CPT

## 2018-05-09 PROCEDURE — 25000003 PHARM REV CODE 250: Performed by: NURSE PRACTITIONER

## 2018-05-09 PROCEDURE — 36415 COLL VENOUS BLD VENIPUNCTURE: CPT

## 2018-05-09 PROCEDURE — 25000003 PHARM REV CODE 250: Performed by: ORTHOPAEDIC SURGERY

## 2018-05-09 PROCEDURE — 63600175 PHARM REV CODE 636 W HCPCS: Performed by: HOSPITALIST

## 2018-05-09 PROCEDURE — 63600175 PHARM REV CODE 636 W HCPCS: Performed by: NURSE PRACTITIONER

## 2018-05-09 PROCEDURE — S0077 INJECTION, CLINDAMYCIN PHOSP: HCPCS | Performed by: NURSE PRACTITIONER

## 2018-05-09 PROCEDURE — 11000001 HC ACUTE MED/SURG PRIVATE ROOM

## 2018-05-09 RX ADMIN — OXYCODONE HYDROCHLORIDE 10 MG: 10 TABLET, FILM COATED, EXTENDED RELEASE ORAL at 09:05

## 2018-05-09 RX ADMIN — CLINDAMYCIN IN 5 PERCENT DEXTROSE 900 MG: 18 INJECTION, SOLUTION INTRAVENOUS at 01:05

## 2018-05-09 RX ADMIN — CLINDAMYCIN IN 5 PERCENT DEXTROSE 900 MG: 18 INJECTION, SOLUTION INTRAVENOUS at 10:05

## 2018-05-09 RX ADMIN — INSULIN ASPART 2 UNITS: 100 INJECTION, SOLUTION INTRAVENOUS; SUBCUTANEOUS at 09:05

## 2018-05-09 RX ADMIN — DOCUSATE SODIUM AND SENNOSIDES 1 TABLET: 8.6; 5 TABLET, FILM COATED ORAL at 09:05

## 2018-05-09 RX ADMIN — INSULIN ASPART 4 UNITS: 100 INJECTION, SOLUTION INTRAVENOUS; SUBCUTANEOUS at 12:05

## 2018-05-09 RX ADMIN — CLINDAMYCIN IN 5 PERCENT DEXTROSE 900 MG: 18 INJECTION, SOLUTION INTRAVENOUS at 05:05

## 2018-05-09 RX ADMIN — INSULIN ASPART 15 UNITS: 100 INJECTION, SUSPENSION SUBCUTANEOUS at 07:05

## 2018-05-09 RX ADMIN — INSULIN ASPART 4 UNITS: 100 INJECTION, SOLUTION INTRAVENOUS; SUBCUTANEOUS at 08:05

## 2018-05-09 RX ADMIN — INSULIN ASPART 15 UNITS: 100 INJECTION, SUSPENSION SUBCUTANEOUS at 05:05

## 2018-05-09 RX ADMIN — HYDROCODONE BITARTRATE AND ACETAMINOPHEN 1 TABLET: 5; 325 TABLET ORAL at 11:05

## 2018-05-09 NOTE — PROGRESS NOTES
"SW met with pt using ATT language line (Nicki #[id number]) at bedside.  SW inquired about HELP AT HOME. SW reviewed HELP AT HOME and DISCHARGE PLANNING brochure of questions to think about while in the hospital. Pt voiced understanding. SW inquired about what pt feels he is RESPONSIBLE for to MANAGE HEALTH AT HOME. Pt stated going to MD appointments and picking up any/all prescriptions and taking as prescribed. Pt able to demonstrate understanding using teach back method. Pt able to tell SW all information regarding managing his care at home that was discussed, able to talk about taking prescriptions as prescribed, getting all medications filled and going on follow up appointments scheduled.  SW also discussed if pt has any issues taking medications prescribed such as cost. Pt reports not recently but in the past SW advised to be sure that if that happens again to contact MD and notify as MD may be able to change Rx to something else that would be more cost effective. Pt has no additional questions or concerns at this time.     EDUCATION:  Pt and pt's Spouse provided with educational information on " Cellulitis ".  Information reviewed and placed in :My Healthcare Packet" to be brought home for pt to use as resource after discharge.  Information included:  signs and symptoms to look for and call the doctor if experiencing, and symptoms that may indicate a medical emergency: CALL 911.      All questions answered.  Teach back method used.  Pt  verbalized understanding of all information by stating, " That he is aware of the signs and symptoms to watch for and when to contact MD and when to report to the ED immediately. Pt informed SW take Rx to the pharmacy to be filled when discharged, be sure to take as prescribed and to go to any and all appointments scheduled at time of discharge."    "

## 2018-05-09 NOTE — PLAN OF CARE
Problem: Patient Care Overview  Goal: Plan of Care Review  Outcome: Ongoing (interventions implemented as appropriate)   05/09/18 0440   Coping/Psychosocial   Plan Of Care Reviewed With patient     Pt with no injuries this shift. IV abx and accuchecks maintained this shift with PRN insulin given per MD order. Pt with minimal complaints of pain overnight and no PRN pain medication given. Will continue with current plan of care. Bed in locked and low position with bed alarm set and call bell within reach.

## 2018-05-09 NOTE — PLAN OF CARE
Problem: Patient Care Overview  Goal: Plan of Care Review  Outcome: Ongoing (interventions implemented as appropriate)  All systems assessed. No falls or injuries this shift. Denies pain to hand. Will continue to monitor.

## 2018-05-09 NOTE — PROGRESS NOTES
WRITTEN HEALTHCARE DISCHARGE INFORMATION     Things that YOU are responsible for to Manage Your Care At Home:  1. Getting your prescriptions filled.  2. Taking you medications as directed. DO NOT MISS ANY DOSES!  3. Going to your follow-up doctor appointments. This is important because it allows the doctor to monitor your progress and to determine if any changes need to be made to your treatment plan.    If you are unable to make your follow up appointments, please call the number listed and reschedule this appointment.     After discharge, if you need assistance, you can call [facility] On Call Nurse Care Line for 24/7 assistance at [phone]    If you are experience any signs or symptoms, Call your doctor or Call 911 and come to your nearest Emergency Room.    Thank you for choosing [facility] and allowing us to care for you.   From your care management team: Lexie SPEARS,RSW [phone]     You should receive a call from [facility] within 48-72 hours to help manage your care after discharge. Please try to make sure that you answer your phone for this important phone call.     Follow-up Information     [facility] On 6/6/2018.    Why:  Wednesday at 5:20pm. Diabetes Follow up appointment.   Contact information:  [place] [id number]  [phone]             [facility] On 5/17/2018.    Why:  Thursday at 1:15pm. Right hand Cellulitis and Stitches Removal follow up appointment.   Contact information:  [place] [address]  [phone]

## 2018-05-09 NOTE — PROGRESS NOTES
"[facility] Medicine  Progress Note    Patient Name: Robel Desai  MRN: [id number]  Patient Class: IP- Inpatient   Admission Date: 5/5/2018  Length of Stay: 3 days  Attending Physician: Christy Medrano MD  Primary Care Provider: [facility]        Subjective:     Principal Problem:Cellulitis of finger of right hand    HPI:  Mr. Robel Desai is a 37 y.o. man with DM who presents with swelling of R hand and forehead. States that he developed "pimples" on his forehead and R hand that "exploded" and started to drain and are now swollen and painful. Lesion on head started 1 week ago; lesion on finger started on Friday as a small "pimple" that gradually increased. He works installing insulation. 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 No pets at home. Denies bug bites. Denies IVDU. Denies fevers, chills, other lesions. Asking repeatedly for it to be lanced.     Hospital Course:  Given vanc in ED. Started on clindamycin for forehead and R hand cellulitis. Ortho consulted- I&D on 5/7.     Wound cultures pending. Per language line translation, pain controlled. Pt moving right hand, good pulses. Once cultures result and wound care to assess, can dc home with oral antibiotic.   5/9- wound growing Staph, species to result tomorrow per lab, cont clindamycin and daily dressing changes per ortho     Interval History: pain controlled    Review of Systems   Constitutional: Negative for chills and fever.   Respiratory: Negative for shortness of breath.    Cardiovascular: Negative for chest pain.   Gastrointestinal: Negative for abdominal pain.   Musculoskeletal: Positive for joint swelling (R hand and 4th finger).     Objective:     Vital Signs (Most Recent):  Temp: 98.5 °F (36.9 °C) (05/09/18 0745)  Pulse: 85 (05/09/18 0745)  Resp: 18 (05/09/18 0745)  BP: (!) 141/87 (05/09/18 0745)  SpO2: 98 % (05/09/18 0745) Vital Signs (24h Range):  Temp:  [98.1 °F (36.7 °C)-101.5 °F (38.6 °C)] 98.5 °F (36.9 °C)  Pulse:  " [] 85  Resp:  [18] 18  SpO2:  [91 %-98 %] 98 %  BP: (113-141)/(64-87) 141/87     Weight: 99.3 kg (218 lb 14.7 oz)  Body mass index is 37.58 kg/m².    Intake/Output Summary (Last 24 hours) at 05/09/18 1130  Last data filed at 05/09/18 0848   Gross per 24 hour   Intake              770 ml   Output             1500 ml   Net             -730 ml      Physical Exam   Constitutional: He is oriented to person, place, and time. He appears well-developed and well-nourished. No distress.   obese   HENT:   ~2cm open nodule draining small amount serosanguinous fluid on R side of forehead   Eyes: EOM are normal. No scleral icterus.   Cardiovascular: Normal rate, regular rhythm, normal heart sounds and intact distal pulses.  Exam reveals no friction rub.    No murmur heard.  Pulmonary/Chest: Effort normal and breath sounds normal. No respiratory distress. He has no wheezes. He has no rales.   Abdominal: Soft. Bowel sounds are normal. He exhibits distension. He exhibits no mass. There is no tenderness. There is no guarding.   Musculoskeletal: He exhibits no edema.   Neurological: He is alert and oriented to person, place, and time.   Skin: He is not diaphoretic.   R hand swelling, induration, erythema, warmth on dorsal surface   Vitals reviewed.      Significant Labs: All pertinent labs within the past 24 hours have been reviewed.    Significant Imaging: I have reviewed all pertinent imaging results/findings within the past 24 hours.    Assessment/Plan:      * Cellulitis of finger of right hand    - presents with right forehead and right hand cellulitis. Does not meet sepsis criteria. Failed outpatient PCN  - started on clindamycin for presumed MRSA infection  - no abscess formation at this time  - XR R hand showing soft tissue swelling  - blood cultures NGTD  - orthopaedics consulted- I&D 5/7  - continue clindamycin  -staph species pending (wound culture)          MYLA (acute kidney injury)    Cr 1.8 on arrival from 1.2 in  2017  Given IVF in ED  Resolved, renal function now at baseline  Avoid nephrotoxins, renally dose all medications         Essential hypertension    - patient does not know home med, likely it is ACEi or ARB for DM and HTN  - intermittently HTN  - will continue to monitor off Rx for now- BP generally controlled          Type 2 diabetes mellitus with hyperglycemia, with long-term current use of insulin    - A1c 9.3%  - patient states that he takes 10U insulin in AM and 40U in PM but is unclear what type  - ADA diet, accuchecks, hypoglycemic protocol  - glucose at goal with no insulin at this time  - will continue to monitor  - SSI  - start basal bolus if glucoses consistently >180          Cellulitis of head except face    - see above  - open draining wound  - continue to monitor  - continue clindamycin             VTE Risk Mitigation         Ordered     IP VTE HIGH RISK PATIENT  Once      05/07/18 1806     Place MATTHIAS hose  Until discontinued      05/07/18 1806     Place MATTHIAS hose  Until discontinued      05/06/18 0546              Christy Medrano MD  Department of Hospital Medicine   [facility]

## 2018-05-09 NOTE — SUBJECTIVE & OBJECTIVE
Interval History: pain controlled    Review of Systems   Constitutional: Negative for chills and fever.   Respiratory: Negative for shortness of breath.    Cardiovascular: Negative for chest pain.   Gastrointestinal: Negative for abdominal pain.   Musculoskeletal: Positive for joint swelling (R hand and 4th finger).     Objective:     Vital Signs (Most Recent):  Temp: 98.5 °F (36.9 °C) (05/09/18 0745)  Pulse: 85 (05/09/18 0745)  Resp: 18 (05/09/18 0745)  BP: (!) 141/87 (05/09/18 0745)  SpO2: 98 % (05/09/18 0745) Vital Signs (24h Range):  Temp:  [98.1 °F (36.7 °C)-101.5 °F (38.6 °C)] 98.5 °F (36.9 °C)  Pulse:  [] 85  Resp:  [18] 18  SpO2:  [91 %-98 %] 98 %  BP: (113-141)/(64-87) 141/87     Weight: 99.3 kg (218 lb 14.7 oz)  Body mass index is 37.58 kg/m².    Intake/Output Summary (Last 24 hours) at 05/09/18 1130  Last data filed at 05/09/18 0848   Gross per 24 hour   Intake              770 ml   Output             1500 ml   Net             -730 ml      Physical Exam   Constitutional: He is oriented to person, place, and time. He appears well-developed and well-nourished. No distress.   obese   HENT:   ~2cm open nodule draining small amount serosanguinous fluid on R side of forehead   Eyes: EOM are normal. No scleral icterus.   Cardiovascular: Normal rate, regular rhythm, normal heart sounds and intact distal pulses.  Exam reveals no friction rub.    No murmur heard.  Pulmonary/Chest: Effort normal and breath sounds normal. No respiratory distress. He has no wheezes. He has no rales.   Abdominal: Soft. Bowel sounds are normal. He exhibits distension. He exhibits no mass. There is no tenderness. There is no guarding.   Musculoskeletal: He exhibits no edema.   Neurological: He is alert and oriented to person, place, and time.   Skin: He is not diaphoretic.   R hand swelling, induration, erythema, warmth on dorsal surface   Vitals reviewed.      Significant Labs: All pertinent labs within the past 24 hours have been  reviewed.    Significant Imaging: I have reviewed all pertinent imaging results/findings within the past 24 hours.

## 2018-05-10 VITALS
WEIGHT: 218.94 LBS | DIASTOLIC BLOOD PRESSURE: 83 MMHG | HEIGHT: 64 IN | SYSTOLIC BLOOD PRESSURE: 133 MMHG | RESPIRATION RATE: 17 BRPM | HEART RATE: 86 BPM | BODY MASS INDEX: 37.38 KG/M2 | OXYGEN SATURATION: 96 % | TEMPERATURE: 98 F

## 2018-05-10 PROBLEM — N17.9 AKI (ACUTE KIDNEY INJURY): Status: RESOLVED | Noted: 2018-05-06 | Resolved: 2018-05-10

## 2018-05-10 LAB
ANION GAP SERPL CALC-SCNC: 7 MMOL/L
BUN SERPL-MCNC: 24 MG/DL
CALCIUM SERPL-MCNC: 9.6 MG/DL
CHLORIDE SERPL-SCNC: 104 MMOL/L
CO2 SERPL-SCNC: 24 MMOL/L
CREAT SERPL-MCNC: 1.3 MG/DL
EST. GFR  (AFRICAN AMERICAN): >60 ML/MIN/1.73 M^2
EST. GFR  (NON AFRICAN AMERICAN): >60 ML/MIN/1.73 M^2
GLUCOSE SERPL-MCNC: 244 MG/DL
POCT GLUCOSE: 234 MG/DL (ref 70–110)
POCT GLUCOSE: 237 MG/DL (ref 70–110)
POTASSIUM SERPL-SCNC: 4.5 MMOL/L
SODIUM SERPL-SCNC: 135 MMOL/L

## 2018-05-10 PROCEDURE — 36415 COLL VENOUS BLD VENIPUNCTURE: CPT

## 2018-05-10 PROCEDURE — 25000003 PHARM REV CODE 250: Performed by: NURSE PRACTITIONER

## 2018-05-10 PROCEDURE — S0077 INJECTION, CLINDAMYCIN PHOSP: HCPCS | Performed by: NURSE PRACTITIONER

## 2018-05-10 PROCEDURE — 80048 BASIC METABOLIC PNL TOTAL CA: CPT

## 2018-05-10 RX ORDER — HYDROCODONE BITARTRATE AND ACETAMINOPHEN 5; 325 MG/1; MG/1
1 TABLET ORAL EVERY 4 HOURS PRN
Qty: 20 TABLET | Refills: 0 | Status: SHIPPED | OUTPATIENT
Start: 2018-05-10 | End: 2022-09-25

## 2018-05-10 RX ORDER — CLINDAMYCIN HYDROCHLORIDE 300 MG/1
300 CAPSULE ORAL EVERY 8 HOURS
Qty: 21 CAPSULE | Refills: 0 | Status: SHIPPED | OUTPATIENT
Start: 2018-05-10 | End: 2018-05-17

## 2018-05-10 RX ADMIN — INSULIN ASPART 4 UNITS: 100 INJECTION, SOLUTION INTRAVENOUS; SUBCUTANEOUS at 12:05

## 2018-05-10 RX ADMIN — CLINDAMYCIN IN 5 PERCENT DEXTROSE 900 MG: 18 INJECTION, SOLUTION INTRAVENOUS at 02:05

## 2018-05-10 RX ADMIN — DOCUSATE SODIUM AND SENNOSIDES 1 TABLET: 8.6; 5 TABLET, FILM COATED ORAL at 09:05

## 2018-05-10 RX ADMIN — CLINDAMYCIN IN 5 PERCENT DEXTROSE 900 MG: 18 INJECTION, SOLUTION INTRAVENOUS at 09:05

## 2018-05-10 RX ADMIN — HYDROCODONE BITARTRATE AND ACETAMINOPHEN 1 TABLET: 5; 325 TABLET ORAL at 02:05

## 2018-05-10 RX ADMIN — INSULIN ASPART 4 UNITS: 100 INJECTION, SOLUTION INTRAVENOUS; SUBCUTANEOUS at 08:05

## 2018-05-10 RX ADMIN — INSULIN ASPART 15 UNITS: 100 INJECTION, SUSPENSION SUBCUTANEOUS at 07:05

## 2018-05-10 RX ADMIN — HYDROCODONE BITARTRATE AND ACETAMINOPHEN 1 TABLET: 5; 325 TABLET ORAL at 04:05

## 2018-05-10 NOTE — DISCHARGE INSTRUCTIONS
Local wound care to right hand daily- Cleanse incision and hand with NS. Dry hand completely. Place wick of Aquacel Ag into opening between sutures on dorsal hand. Place dry gauze in web between fingers. Cover incision with dry gauze and wrap hand with Coban or ACE wrap. Continue to elevate hand to manage edema.

## 2018-05-10 NOTE — DISCHARGE SUMMARY
"[facility]  Discharge Summary      Patient Name: Robel Desai  MRN: [id number]  Admission Date: 5/5/2018  Hospital Length of Stay: 4 days  Discharge Date and Time:  05/10/2018 1:17 PM  Attending Physician: Christy Medrano MD   Discharging Provider: Christy Medrano MD  Primary Care Provider: [facility]      HPI:   Mr. Robel Desai is a 37 y.o. man with DM who presents with swelling of R hand and forehead. States that he developed "pimples" on his forehead and R hand that "exploded" and started to drain and are now swollen and painful. Lesion on head started 1 week ago; lesion on finger started on Friday as a small "pimple" that gradually increased. He works installing insulation. 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 No pets at home. Denies bug bites. Denies IVDU. Denies fevers, chills, other lesions. Asking repeatedly for it to be lanced.     Procedure(s) (LRB):  INCISION AND DRAINAGE (I & D)-HAND (Right)      Hospital Course:   Given vanc in ED. Started on clindamycin for forehead and R hand cellulitis. Ortho consulted- I&D on 5/7.     Wound cultures pending. Per language line translation, pain controlled. Pt moving right hand, good pulses. Once cultures result and wound care to assess, can dc home with oral antibiotic.   5/9- wound growing Staph, species to result tomorrow per lab, cont clindamycin and daily dressing changes per ortho   5/10- patient adamant about leaving; lab reports that they had to repeat sensitivities on wound culture, + staph growing. Improved with clindamycin and plan to dc home on oral clindamycin x 7 days. Patient left a family member number to call (speaks English) if antibiotic needs to be changed when final culture results tomorrow. This was translated using language line. Wound care instructions given on dc home. Follow up appt at [facility].      Consults:   Consults         Status Ordering Provider     Inpatient consult to Orthopedic " Surgery  Once     Provider:  Patrick Owusu MD    Completed LISA CORTEZ          No new Assessment & Plan notes have been filed under this hospital service since the last note was generated.  Service: Hospital Medicine    Final Active Diagnoses:    Diagnosis Date Noted POA    PRINCIPAL PROBLEM:  Cellulitis of finger of right hand [L03.011] 05/06/2018 Yes    Cellulitis of right hand [L03.113] 05/08/2018 Yes    Cellulitis of head except face [L03.811] 05/06/2018 Yes    Type 2 diabetes mellitus with hyperglycemia, with long-term current use of insulin [E11.65, Z79.4] 05/06/2018 Not Applicable    Essential hypertension [I10] 05/06/2018 Yes      Problems Resolved During this Admission:    Diagnosis Date Noted Date Resolved POA    MYLA (acute kidney injury) [N17.9] 05/06/2018 05/10/2018 Yes       Discharged Condition: good    Disposition: Home or Self Care    Follow Up:  Follow-up Information     [facility].    Why:  Wednesday at 5:20pm. Diabetes Follow up appointment.   Contact information:  [address] [place] [place] [address]  [phone]             [facility] On 5/17/2018.    Why:  Thursday at 1:15pm. Right hand Cellulitis and Stitches Removal follow up appointment.   Contact information:  [place] [address]  [phone]                 Patient Instructions:     Diet diabetic     Diet Cardiac     Activity as tolerated     Notify your health care provider if you experience any of the following:  temperature >100.4     Notify your health care provider if you experience any of the following:  severe uncontrolled pain     Notify your health care provider if you experience any of the following:  persistent nausea and vomiting or diarrhea     Notify your health care provider if you experience any of the following:  redness, tenderness, or signs of infection (pain, swelling, redness, odor or green/yellow discharge around incision site)          Significant Diagnostic Studies:   Aerobic Bacterial Culture STAPHYLOCOCCUS AUREUS   Many   Susceptibility pending             Pending Diagnostic Studies:     None         Medications:  Reconciled Home Medications:      Medication List      START taking these medications    clindamycin 300 MG capsule  Commonly known as:  CLEOCIN  Take 1 capsule (300 mg total) by mouth every 8 (eight) hours.     hydrocodone-acetaminophen 5-325mg 5-325 mg per tablet  Commonly known as:  NORCO  Take 1 tablet by mouth every 4 (four) hours as needed.        CONTINUE taking these medications    butalbital-acetaminophen-caffeine -40 mg -40 mg per tablet  Commonly known as:  FIORICET  Take 1-2 tablets by mouth every 6 (six) hours as needed for Pain.     ibuprofen 600 MG tablet  Commonly known as:  ADVIL,MOTRIN  Take 1 tablet (600 mg total) by mouth every 6 (six) hours as needed for Pain.     insulin aspart protamine-insulin aspart 100 unit/mL (70-30) Inpn pen  Commonly known as:  NovoLOG 70/30  Inject 20 Units into the skin after dinner.     lisinopril 10 MG tablet  Take 10 mg by mouth once daily.        STOP taking these medications    sulindac 200 MG Tab  Commonly known as:  CLINORIL     tiZANidine 4 MG tablet  Commonly known as:  ZANAFLEX            Indwelling Lines/Drains at time of discharge:   Lines/Drains/Airways          No matching active lines, drains, or airways          Time spent on the discharge of patient: 45 minutes  Patient was seen and examined on the date of discharge and determined to be suitable for discharge.         Christy Medrano MD  Department of Hospital Medicine  [facility]

## 2018-05-10 NOTE — PLAN OF CARE
Problem: Patient Care Overview  Goal: Plan of Care Review  Outcome: Ongoing (interventions implemented as appropriate)   05/10/18 0897   Coping/Psychosocial   Plan Of Care Reviewed With patient     Pt with no injuries this shift. IV abx and accuchecks maintained this shift with PRN insulin given per MD order. Pt with minimal complaints of pain overnight and PRN pain medication given once. Will continue with current plan of care. Bed in locked and low position with bed alarm set and call bell within reach.

## 2018-05-10 NOTE — PLAN OF CARE
Problem: Patient Care Overview  Goal: Plan of Care Review  Outcome: Ongoing (interventions implemented as appropriate)  All systems assessed. No falls or injuries this shift. Labs reviewed with insignificant changes. Denies pain. Will continue to monitor.

## 2018-05-11 LAB
BACTERIA BLD CULT: NORMAL
BACTERIA BLD CULT: NORMAL
BACTERIA SPEC AEROBE CULT: NORMAL
BACTERIA SPEC ANAEROBE CULT: NORMAL

## 2018-06-10 LAB — FUNGUS SPEC CULT: NORMAL

## 2019-11-30 ENCOUNTER — HOSPITAL ENCOUNTER (EMERGENCY)
Facility: HOSPITAL | Age: 39
Discharge: HOME | End: 2019-11-30
Attending: EMERGENCY MEDICINE

## 2019-11-30 VITALS
HEART RATE: 85 BPM | BODY MASS INDEX: 39.27 KG/M2 | WEIGHT: 200 LBS | HEIGHT: 60 IN | DIASTOLIC BLOOD PRESSURE: 88 MMHG | SYSTOLIC BLOOD PRESSURE: 132 MMHG | OXYGEN SATURATION: 99 % | RESPIRATION RATE: 18 BRPM | TEMPERATURE: 98 F

## 2019-11-30 DIAGNOSIS — H53.8 BLURRED VISION: Primary | ICD-10-CM

## 2019-11-30 PROCEDURE — 99283 EMERGENCY DEPT VISIT LOW MDM: CPT

## 2019-11-30 PROCEDURE — 25000003 PHARM REV CODE 250: Performed by: NURSE PRACTITIONER

## 2019-11-30 RX ORDER — ERYTHROMYCIN 5 MG/G
OINTMENT OPHTHALMIC
Status: COMPLETED | OUTPATIENT
Start: 2019-11-30 | End: 2019-11-30

## 2019-11-30 RX ORDER — ERYTHROMYCIN 5 MG/G
OINTMENT OPHTHALMIC
Qty: 1 TUBE | Refills: 0 | Status: SHIPPED | OUTPATIENT
Start: 2019-11-30 | End: 2022-09-25

## 2019-11-30 RX ORDER — PROPARACAINE HYDROCHLORIDE 5 MG/ML
1 SOLUTION/ DROPS OPHTHALMIC
Status: COMPLETED | OUTPATIENT
Start: 2019-11-30 | End: 2019-11-30

## 2019-11-30 RX ADMIN — FLUORESCEIN SODIUM 1 EACH: 1 STRIP OPHTHALMIC at 06:11

## 2019-11-30 RX ADMIN — PROPARACAINE HYDROCHLORIDE 1 DROP: 5 SOLUTION/ DROPS OPHTHALMIC at 06:11

## 2019-11-30 RX ADMIN — ERYTHROMYCIN 1 INCH: 5 OINTMENT OPHTHALMIC at 06:11

## 2019-11-30 NOTE — ED PROVIDER NOTES
Encounter Date: 11/30/2019    SCRIBE #1 NOTE: I, Osmany Brooks, am scribing for, and in the presence of,  Jon Rubio NP. I have scribed the following portions of the note - Other sections scribed: HPI, ROS.       History     Chief Complaint   Patient presents with    Blurred Vision     The patient reports blurred vsion to right eye x 2 days. Denies pain, trauma.      Time seen by provider: 5:48pm    CC: Blurred Vision    HPI:  This is a 39 y.o male with medical h/o HTN, DM who presents to the ED for evaluation of R eye blurry vision with associated photophobia for past 2 days. Pt does not wear eyeglasses or contact lenses. He denies any pain or recent trauma. Had attempted treatment by using [employer] Ofloxacin eye drops without relief. No other medical intervention. States he feels like there is a foreign body within his R eye whenever he presses his finger against the corner of his eye. Notes he recently had his PCP check his vision about 1 month ago.      The history is provided by the patient and medical records. The history is limited by a language barrier ([place]). A  was used ([employer]  #[id number]).     Review of patient's allergies indicates:  No Known Allergies  Past Medical History:   Diagnosis Date    Diabetes mellitus     Hypertension      Past Surgical History:   Procedure Laterality Date    APPENDECTOMY       Family History   Problem Relation Age of Onset    Diabetes Mother      Social History     Tobacco Use    Smoking status: Never Smoker    Smokeless tobacco: Never Used   Substance Use Topics    Alcohol use: Yes     Comment: occasionally     Drug use: No     Review of Systems   Constitutional: Negative for fever.   HENT: Negative for sore throat.    Eyes: Positive for photophobia and visual disturbance (blurry vision).   Respiratory: Negative for shortness of breath.    Cardiovascular: Negative for chest pain.   Gastrointestinal: Negative for  abdominal pain.   Genitourinary: Negative for dysuria.   Musculoskeletal: Negative for back pain.   Skin: Negative for rash.   Neurological: Negative for headaches.       Physical Exam     Initial Vitals [11/30/19 1740]   BP Pulse Resp Temp SpO2   139/81 99 16 97.8 °F (36.6 °C) 97 %      MAP       --         Physical Exam    Nursing note and vitals reviewed.  Constitutional: He appears well-developed and well-nourished. He is not diaphoretic. No distress.   HENT:   Head: Normocephalic and atraumatic.   Right Ear: External ear normal.   Left Ear: External ear normal.   Nose: Nose normal.   Eyes: Conjunctivae, EOM and lids are normal. Pupils are equal, round, and reactive to light. Lids are everted and swept, no foreign bodies found. Right eye exhibits no chemosis, no discharge, no exudate and no hordeolum. No foreign body present in the right eye. Left eye exhibits no discharge. No scleral icterus.   Fundoscopic exam:       The right eye shows no arteriolar narrowing, no AV nicking, no exudate, no hemorrhage and no papilledema. The right eye shows red reflex and venous pulsations.   Slit lamp exam:       The right eye shows no corneal abrasion, no corneal flare, no corneal ulcer, no foreign body, no hyphema, no hypopyon, no fluorescein uptake and no anterior chamber bulge.   IOP=18 on right   Neck: Normal range of motion.   Pulmonary/Chest: No respiratory distress.   Abdominal: He exhibits no distension.   Musculoskeletal: Normal range of motion.   Neurological: He is alert and oriented to person, place, and time.   Skin: Skin is dry. Capillary refill takes less than 2 seconds.         ED Course   Procedures  Labs Reviewed - No data to display       Imaging Results    None          Medical Decision Making:   Initial Assessment:   39-year-old male with acutely blurred vision in the right eye that started several days ago.  He reports a feeling of a foreign body near the inner canthus.  On physical exam I can't detect  no foreign body.  Visual acuity is slightly worse in the right eye than the left.  Differential Diagnosis:   Diabetic retinopathy, decrease in visual acuity, foreign body  ED Management:  Initial orders include visual acuity, proparacaine, fluorescein  ------------------------------------------------  Following my examination of erythromycin ointment was applied to the eye.  A prescription for same was provided.  Patient should follow up with Ophthalmology soon as possible.  Return for any worsening or changes in condition. Symptomatic therapies and return precautions on AVS.   Medication choices were made after reviewing allergies, medications, history, available laboratories.                                  Clinical Impression:   No diagnosis found.    Scribe Attestation: SWAPNA OVERTON DNP ACNP-BC FNP-C, personally performed the services described in this documentation. All medical record entries made by the scribe were at my direction and in my presence. I have reviewed the chart and agree that the record reflects my personal performance and is accurate and complete.  Disposition:   Disposition: Discharged  Condition: Stable                     Jon Rubio DNP  12/01/19 0044

## 2019-11-30 NOTE — ED TRIAGE NOTES
Pt. Reports blurry vision to the right eye for the past 2 days. Denies any trauma or injures. Pt. Denies wearing any glasses or contacts. Pt. Reports hx of DM. Pt. Reports his PCP checked his vision about 1 month ago.

## 2021-07-01 ENCOUNTER — PATIENT MESSAGE (OUTPATIENT)
Dept: ADMINISTRATIVE | Facility: OTHER | Age: 41
End: 2021-07-01

## 2021-11-27 NOTE — PLAN OF CARE
05/10/18 1312   Final Note   Assessment Type Final Discharge Note   Discharge Disposition Home   What phone number can be called within the next 1-3 days to see how you are doing after discharge? [phone]   Hospital Follow Up  Appt(s) scheduled? Yes   Discharge plans and expectations educations in teach back method with documentation complete? Yes   Right Care Referral Info   Post Acute Recommendation No Care      Never smoker

## 2022-09-13 ENCOUNTER — HOSPITAL ENCOUNTER (OUTPATIENT)
Facility: HOSPITAL | Age: 42
Discharge: HOME | End: 2022-09-15
Attending: STUDENT IN AN ORGANIZED HEALTH CARE EDUCATION/TRAINING PROGRAM | Admitting: STUDENT IN AN ORGANIZED HEALTH CARE EDUCATION/TRAINING PROGRAM
Payer: MEDICAID

## 2022-09-13 DIAGNOSIS — R07.9 CHEST PAIN: ICD-10-CM

## 2022-09-13 DIAGNOSIS — E87.5 HYPERKALEMIA: ICD-10-CM

## 2022-09-13 DIAGNOSIS — E11.65 HYPERGLYCEMIA DUE TO DIABETES MELLITUS: ICD-10-CM

## 2022-09-13 DIAGNOSIS — R94.31 EKG ABNORMALITY: ICD-10-CM

## 2022-09-13 DIAGNOSIS — N17.9 AKI (ACUTE KIDNEY INJURY): Primary | ICD-10-CM

## 2022-09-13 PROBLEM — E66.811 CLASS 1 OBESITY IN ADULT: Status: ACTIVE | Noted: 2022-09-13

## 2022-09-13 PROBLEM — E66.812 CLASS 2 OBESITY IN ADULT: Status: ACTIVE | Noted: 2022-09-13

## 2022-09-13 PROBLEM — E66.9 CLASS 1 OBESITY IN ADULT: Status: ACTIVE | Noted: 2022-09-13

## 2022-09-13 LAB
ALBUMIN SERPL BCP-MCNC: 3.6 G/DL (ref 3.5–5.2)
ALLENS TEST: ABNORMAL
ALP SERPL-CCNC: 89 U/L (ref 55–135)
ALT SERPL W/O P-5'-P-CCNC: 17 U/L (ref 10–44)
ANION GAP SERPL CALC-SCNC: 11 MMOL/L (ref 8–16)
AST SERPL-CCNC: 17 U/L (ref 10–40)
B-OH-BUTYR BLD STRIP-SCNC: 0.2 MMOL/L (ref 0–0.5)
BACTERIA #/AREA URNS HPF: ABNORMAL /HPF
BASOPHILS # BLD AUTO: 0.03 K/UL (ref 0–0.2)
BASOPHILS NFR BLD: 0.4 % (ref 0–1.9)
BILIRUB SERPL-MCNC: 0.3 MG/DL (ref 0.1–1)
BILIRUB UR QL STRIP: NEGATIVE
BUN SERPL-MCNC: 43 MG/DL (ref 6–20)
CALCIUM SERPL-MCNC: 9.5 MG/DL (ref 8.7–10.5)
CHLORIDE SERPL-SCNC: 105 MMOL/L (ref 95–110)
CLARITY UR: CLEAR
CO2 SERPL-SCNC: 18 MMOL/L (ref 23–29)
COLOR UR: YELLOW
CREAT SERPL-MCNC: 2.6 MG/DL (ref 0.5–1.4)
CTP QC/QA: YES
DELSYS: ABNORMAL
DIFFERENTIAL METHOD: ABNORMAL
EOSINOPHIL # BLD AUTO: 0.1 K/UL (ref 0–0.5)
EOSINOPHIL NFR BLD: 1.2 % (ref 0–8)
ERYTHROCYTE [DISTWIDTH] IN BLOOD BY AUTOMATED COUNT: 12.3 % (ref 11.5–14.5)
EST. GFR  (NO RACE VARIABLE): 31 ML/MIN/1.73 M^2
FIO2: 21
GLUCOSE SERPL-MCNC: 347 MG/DL (ref 70–110)
GLUCOSE UR QL STRIP: ABNORMAL
HCO3 UR-SCNC: 13.4 MMOL/L (ref 24–28)
HCT VFR BLD AUTO: 38.8 % (ref 40–54)
HGB BLD-MCNC: 13.7 G/DL (ref 14–18)
HGB UR QL STRIP: NEGATIVE
HYALINE CASTS #/AREA URNS LPF: 3 /LPF
IMM GRANULOCYTES # BLD AUTO: 0.01 K/UL (ref 0–0.04)
IMM GRANULOCYTES NFR BLD AUTO: 0.1 % (ref 0–0.5)
KETONES UR QL STRIP: NEGATIVE
LEUKOCYTE ESTERASE UR QL STRIP: NEGATIVE
LYMPHOCYTES # BLD AUTO: 2 K/UL (ref 1–4.8)
LYMPHOCYTES NFR BLD: 26.3 % (ref 18–48)
MCH RBC QN AUTO: 29.8 PG (ref 27–31)
MCHC RBC AUTO-ENTMCNC: 35.3 G/DL (ref 32–36)
MCV RBC AUTO: 84 FL (ref 82–98)
MICROSCOPIC COMMENT: ABNORMAL
MODE: ABNORMAL
MONOCYTES # BLD AUTO: 0.5 K/UL (ref 0.3–1)
MONOCYTES NFR BLD: 6.6 % (ref 4–15)
NEUTROPHILS # BLD AUTO: 5.1 K/UL (ref 1.8–7.7)
NEUTROPHILS NFR BLD: 65.4 % (ref 38–73)
NITRITE UR QL STRIP: NEGATIVE
NRBC BLD-RTO: 0 /100 WBC
PCO2 BLDA: 29.1 MMHG (ref 35–45)
PH SMN: 7.27 [PH] (ref 7.35–7.45)
PH UR STRIP: 7 [PH] (ref 5–8)
PLATELET # BLD AUTO: 237 K/UL (ref 150–450)
PMV BLD AUTO: 11.1 FL (ref 9.2–12.9)
PO2 BLDA: 19 MMHG (ref 40–60)
POC BE: -12 MMOL/L
POC SATURATED O2: 25 % (ref 95–100)
POC TCO2: 14 MMOL/L (ref 24–29)
POCT GLUCOSE: 335 MG/DL (ref 70–110)
POCT GLUCOSE: 415 MG/DL (ref 70–110)
POTASSIUM SERPL-SCNC: 4.4 MMOL/L (ref 3.5–5.1)
PROT SERPL-MCNC: 7.5 G/DL (ref 6–8.4)
PROT UR QL STRIP: ABNORMAL
RBC # BLD AUTO: 4.6 M/UL (ref 4.6–6.2)
RBC #/AREA URNS HPF: 2 /HPF (ref 0–4)
SAMPLE: ABNORMAL
SARS-COV-2 RDRP RESP QL NAA+PROBE: NEGATIVE
SITE: ABNORMAL
SODIUM SERPL-SCNC: 134 MMOL/L (ref 136–145)
SP GR UR STRIP: 1.02 (ref 1–1.03)
URN SPEC COLLECT METH UR: ABNORMAL
UROBILINOGEN UR STRIP-ACNC: NEGATIVE EU/DL
WBC # BLD AUTO: 7.76 K/UL (ref 3.9–12.7)
WBC #/AREA URNS HPF: 2 /HPF (ref 0–5)
YEAST URNS QL MICRO: ABNORMAL

## 2022-09-13 PROCEDURE — U0002 COVID-19 LAB TEST NON-CDC: HCPCS | Performed by: STUDENT IN AN ORGANIZED HEALTH CARE EDUCATION/TRAINING PROGRAM

## 2022-09-13 PROCEDURE — 85025 COMPLETE CBC W/AUTO DIFF WBC: CPT | Performed by: EMERGENCY MEDICINE

## 2022-09-13 PROCEDURE — 93010 ELECTROCARDIOGRAM REPORT: CPT | Mod: ,,, | Performed by: INTERNAL MEDICINE

## 2022-09-13 PROCEDURE — 99900035 HC TECH TIME PER 15 MIN (STAT)

## 2022-09-13 PROCEDURE — 93005 ELECTROCARDIOGRAM TRACING: CPT

## 2022-09-13 PROCEDURE — 93010 EKG 12-LEAD: ICD-10-PCS | Mod: ,,, | Performed by: INTERNAL MEDICINE

## 2022-09-13 PROCEDURE — 99285 EMERGENCY DEPT VISIT HI MDM: CPT | Mod: 25

## 2022-09-13 PROCEDURE — 83036 HEMOGLOBIN GLYCOSYLATED A1C: CPT | Performed by: NURSE PRACTITIONER

## 2022-09-13 PROCEDURE — G0378 HOSPITAL OBSERVATION PER HR: HCPCS

## 2022-09-13 PROCEDURE — 25000003 PHARM REV CODE 250: Performed by: NURSE PRACTITIONER

## 2022-09-13 PROCEDURE — 81000 URINALYSIS NONAUTO W/SCOPE: CPT | Performed by: EMERGENCY MEDICINE

## 2022-09-13 PROCEDURE — 96372 THER/PROPH/DIAG INJ SC/IM: CPT | Performed by: STUDENT IN AN ORGANIZED HEALTH CARE EDUCATION/TRAINING PROGRAM

## 2022-09-13 PROCEDURE — 82010 KETONE BODYS QUAN: CPT | Performed by: EMERGENCY MEDICINE

## 2022-09-13 PROCEDURE — 82962 GLUCOSE BLOOD TEST: CPT

## 2022-09-13 PROCEDURE — 96374 THER/PROPH/DIAG INJ IV PUSH: CPT

## 2022-09-13 PROCEDURE — 82803 BLOOD GASES ANY COMBINATION: CPT

## 2022-09-13 PROCEDURE — 96361 HYDRATE IV INFUSION ADD-ON: CPT

## 2022-09-13 PROCEDURE — 80053 COMPREHEN METABOLIC PANEL: CPT | Performed by: EMERGENCY MEDICINE

## 2022-09-13 PROCEDURE — 63600175 PHARM REV CODE 636 W HCPCS: Performed by: STUDENT IN AN ORGANIZED HEALTH CARE EDUCATION/TRAINING PROGRAM

## 2022-09-13 PROCEDURE — 25000003 PHARM REV CODE 250: Performed by: EMERGENCY MEDICINE

## 2022-09-13 RX ORDER — IBUPROFEN 200 MG
24 TABLET ORAL
Status: DISCONTINUED | OUTPATIENT
Start: 2022-09-13 | End: 2022-09-15 | Stop reason: HOSPADM

## 2022-09-13 RX ORDER — HEPARIN SODIUM 5000 [USP'U]/ML
5000 INJECTION, SOLUTION INTRAVENOUS; SUBCUTANEOUS EVERY 8 HOURS
Status: DISCONTINUED | OUTPATIENT
Start: 2022-09-14 | End: 2022-09-15 | Stop reason: HOSPADM

## 2022-09-13 RX ORDER — GLUCAGON 1 MG
1 KIT INJECTION
Status: DISCONTINUED | OUTPATIENT
Start: 2022-09-13 | End: 2022-09-15 | Stop reason: HOSPADM

## 2022-09-13 RX ORDER — IBUPROFEN 200 MG
16 TABLET ORAL
Status: DISCONTINUED | OUTPATIENT
Start: 2022-09-13 | End: 2022-09-15 | Stop reason: HOSPADM

## 2022-09-13 RX ORDER — SODIUM CHLORIDE 0.9 % (FLUSH) 0.9 %
10 SYRINGE (ML) INJECTION EVERY 12 HOURS PRN
Status: DISCONTINUED | OUTPATIENT
Start: 2022-09-13 | End: 2022-09-15 | Stop reason: HOSPADM

## 2022-09-13 RX ORDER — ONDANSETRON 2 MG/ML
4 INJECTION INTRAMUSCULAR; INTRAVENOUS
Status: COMPLETED | OUTPATIENT
Start: 2022-09-13 | End: 2022-09-13

## 2022-09-13 RX ORDER — ACETAMINOPHEN 325 MG/1
650 TABLET ORAL EVERY 4 HOURS PRN
Status: DISCONTINUED | OUTPATIENT
Start: 2022-09-13 | End: 2022-09-15 | Stop reason: HOSPADM

## 2022-09-13 RX ORDER — MELOXICAM 7.5 MG/1
7.5 TABLET ORAL DAILY
Status: ON HOLD | COMMUNITY
End: 2022-09-15 | Stop reason: HOSPADM

## 2022-09-13 RX ORDER — METFORMIN HYDROCHLORIDE 1000 MG/1
1000 TABLET ORAL 2 TIMES DAILY WITH MEALS
Status: ON HOLD | COMMUNITY
End: 2022-09-15 | Stop reason: HOSPADM

## 2022-09-13 RX ORDER — GLIPIZIDE 5 MG/1
5 TABLET, FILM COATED, EXTENDED RELEASE ORAL 2 TIMES DAILY
COMMUNITY

## 2022-09-13 RX ORDER — ASPIRIN 81 MG/1
81 TABLET ORAL DAILY
COMMUNITY

## 2022-09-13 RX ORDER — AMLODIPINE BESYLATE 5 MG/1
5 TABLET ORAL DAILY
Status: DISCONTINUED | OUTPATIENT
Start: 2022-09-14 | End: 2022-09-15 | Stop reason: HOSPADM

## 2022-09-13 RX ORDER — SODIUM CHLORIDE 9 MG/ML
INJECTION, SOLUTION INTRAVENOUS CONTINUOUS
Status: DISCONTINUED | OUTPATIENT
Start: 2022-09-13 | End: 2022-09-15 | Stop reason: HOSPADM

## 2022-09-13 RX ORDER — INSULIN ASPART 100 [IU]/ML
25 INJECTION, SOLUTION INTRAVENOUS; SUBCUTANEOUS 2 TIMES DAILY
Status: ON HOLD | COMMUNITY
End: 2022-09-15 | Stop reason: HOSPADM

## 2022-09-13 RX ORDER — ASPIRIN 81 MG/1
81 TABLET ORAL DAILY
Status: DISCONTINUED | OUTPATIENT
Start: 2022-09-14 | End: 2022-09-15 | Stop reason: HOSPADM

## 2022-09-13 RX ORDER — ONDANSETRON 2 MG/ML
4 INJECTION INTRAMUSCULAR; INTRAVENOUS EVERY 6 HOURS PRN
Status: DISCONTINUED | OUTPATIENT
Start: 2022-09-13 | End: 2022-09-15 | Stop reason: HOSPADM

## 2022-09-13 RX ORDER — TADALAFIL 10 MG/1
10 TABLET ORAL DAILY PRN
COMMUNITY

## 2022-09-13 RX ORDER — INSULIN ASPART 100 [IU]/ML
1-10 INJECTION, SOLUTION INTRAVENOUS; SUBCUTANEOUS
Status: DISCONTINUED | OUTPATIENT
Start: 2022-09-13 | End: 2022-09-15 | Stop reason: HOSPADM

## 2022-09-13 RX ORDER — NALOXONE HCL 0.4 MG/ML
0.02 VIAL (ML) INJECTION
Status: DISCONTINUED | OUTPATIENT
Start: 2022-09-13 | End: 2022-09-15 | Stop reason: HOSPADM

## 2022-09-13 RX ADMIN — SODIUM CHLORIDE 1000 ML: 9 INJECTION, SOLUTION INTRAVENOUS at 06:09

## 2022-09-13 RX ADMIN — INSULIN HUMAN 9 UNITS: 100 INJECTION, SOLUTION PARENTERAL at 10:09

## 2022-09-13 RX ADMIN — ONDANSETRON 4 MG: 2 INJECTION INTRAMUSCULAR; INTRAVENOUS at 10:09

## 2022-09-13 RX ADMIN — SODIUM CHLORIDE: 0.9 INJECTION, SOLUTION INTRAVENOUS at 11:09

## 2022-09-13 NOTE — FIRST PROVIDER EVALUATION
Medical screening examination initiated.  I have conducted a focused provider triage encounter, findings are as follows:    Brief history of present illness:  sent by MD for high blood sugar    There were no vitals filed for this visit.    Pertinent physical exam:  Well appearing, ambulatory, no increased work of breathing, normal speech.      Brief workup plan:  labs, fluids, insulin as needed    Preliminary workup initiated; this workup will be continued and followed by the physician or advanced practice provider that is assigned to the patient when roomed.

## 2022-09-14 PROBLEM — E87.5 HYPERKALEMIA: Status: ACTIVE | Noted: 2022-09-14

## 2022-09-14 PROBLEM — R73.9 HYPERGLYCEMIA: Status: ACTIVE | Noted: 2018-05-06

## 2022-09-14 LAB
ALBUMIN SERPL BCP-MCNC: 2.9 G/DL (ref 3.5–5.2)
ALP SERPL-CCNC: 75 U/L (ref 55–135)
ALT SERPL W/O P-5'-P-CCNC: 16 U/L (ref 10–44)
ANION GAP SERPL CALC-SCNC: 8 MMOL/L (ref 8–16)
ANION GAP SERPL CALC-SCNC: 9 MMOL/L (ref 8–16)
AST SERPL-CCNC: 11 U/L (ref 10–40)
BASOPHILS # BLD AUTO: 0.02 K/UL (ref 0–0.2)
BASOPHILS NFR BLD: 0.3 % (ref 0–1.9)
BILIRUB SERPL-MCNC: 0.3 MG/DL (ref 0.1–1)
BUN SERPL-MCNC: 32 MG/DL (ref 6–20)
BUN SERPL-MCNC: 36 MG/DL (ref 6–20)
CALCIUM SERPL-MCNC: 8.8 MG/DL (ref 8.7–10.5)
CALCIUM SERPL-MCNC: 9.2 MG/DL (ref 8.7–10.5)
CHLORIDE SERPL-SCNC: 108 MMOL/L (ref 95–110)
CHLORIDE SERPL-SCNC: 109 MMOL/L (ref 95–110)
CO2 SERPL-SCNC: 17 MMOL/L (ref 23–29)
CO2 SERPL-SCNC: 20 MMOL/L (ref 23–29)
CREAT SERPL-MCNC: 1.7 MG/DL (ref 0.5–1.4)
CREAT SERPL-MCNC: 2 MG/DL (ref 0.5–1.4)
DIFFERENTIAL METHOD: ABNORMAL
EOSINOPHIL # BLD AUTO: 0.1 K/UL (ref 0–0.5)
EOSINOPHIL NFR BLD: 1.7 % (ref 0–8)
ERYTHROCYTE [DISTWIDTH] IN BLOOD BY AUTOMATED COUNT: 12.2 % (ref 11.5–14.5)
EST. GFR  (NO RACE VARIABLE): 42 ML/MIN/1.73 M^2
EST. GFR  (NO RACE VARIABLE): 51 ML/MIN/1.73 M^2
ESTIMATED AVG GLUCOSE: 269 MG/DL (ref 68–131)
GLUCOSE SERPL-MCNC: 238 MG/DL (ref 70–110)
GLUCOSE SERPL-MCNC: 358 MG/DL (ref 70–110)
HBA1C MFR BLD: 11 % (ref 4–5.6)
HCT VFR BLD AUTO: 33 % (ref 40–54)
HGB BLD-MCNC: 11.4 G/DL (ref 14–18)
IMM GRANULOCYTES # BLD AUTO: 0 K/UL (ref 0–0.04)
IMM GRANULOCYTES NFR BLD AUTO: 0 % (ref 0–0.5)
LYMPHOCYTES # BLD AUTO: 2.1 K/UL (ref 1–4.8)
LYMPHOCYTES NFR BLD: 36.9 % (ref 18–48)
MAGNESIUM SERPL-MCNC: 2 MG/DL (ref 1.6–2.6)
MCH RBC QN AUTO: 29.2 PG (ref 27–31)
MCHC RBC AUTO-ENTMCNC: 34.5 G/DL (ref 32–36)
MCV RBC AUTO: 85 FL (ref 82–98)
MONOCYTES # BLD AUTO: 0.4 K/UL (ref 0.3–1)
MONOCYTES NFR BLD: 7.7 % (ref 4–15)
NEUTROPHILS # BLD AUTO: 3.1 K/UL (ref 1.8–7.7)
NEUTROPHILS NFR BLD: 53.4 % (ref 38–73)
NRBC BLD-RTO: 0 /100 WBC
PHOSPHATE SERPL-MCNC: 3.8 MG/DL (ref 2.7–4.5)
PLATELET # BLD AUTO: 207 K/UL (ref 150–450)
PMV BLD AUTO: 11.3 FL (ref 9.2–12.9)
POCT GLUCOSE: 141 MG/DL (ref 70–110)
POCT GLUCOSE: 207 MG/DL (ref 70–110)
POCT GLUCOSE: 209 MG/DL (ref 70–110)
POCT GLUCOSE: 311 MG/DL (ref 70–110)
POCT GLUCOSE: 382 MG/DL (ref 70–110)
POTASSIUM SERPL-SCNC: 4.3 MMOL/L (ref 3.5–5.1)
POTASSIUM SERPL-SCNC: 4.7 MMOL/L (ref 3.5–5.1)
PROT SERPL-MCNC: 6.1 G/DL (ref 6–8.4)
RBC # BLD AUTO: 3.9 M/UL (ref 4.6–6.2)
SODIUM SERPL-SCNC: 134 MMOL/L (ref 136–145)
SODIUM SERPL-SCNC: 137 MMOL/L (ref 136–145)
WBC # BLD AUTO: 5.72 K/UL (ref 3.9–12.7)

## 2022-09-14 PROCEDURE — 63600175 PHARM REV CODE 636 W HCPCS: Performed by: STUDENT IN AN ORGANIZED HEALTH CARE EDUCATION/TRAINING PROGRAM

## 2022-09-14 PROCEDURE — 25000003 PHARM REV CODE 250: Performed by: NURSE PRACTITIONER

## 2022-09-14 PROCEDURE — G0378 HOSPITAL OBSERVATION PER HR: HCPCS

## 2022-09-14 PROCEDURE — 96372 THER/PROPH/DIAG INJ SC/IM: CPT | Performed by: NURSE PRACTITIONER

## 2022-09-14 PROCEDURE — 84100 ASSAY OF PHOSPHORUS: CPT | Performed by: NURSE PRACTITIONER

## 2022-09-14 PROCEDURE — 63600175 PHARM REV CODE 636 W HCPCS: Performed by: NURSE PRACTITIONER

## 2022-09-14 PROCEDURE — 80053 COMPREHEN METABOLIC PANEL: CPT | Performed by: NURSE PRACTITIONER

## 2022-09-14 PROCEDURE — 80048 BASIC METABOLIC PNL TOTAL CA: CPT | Mod: XB | Performed by: STUDENT IN AN ORGANIZED HEALTH CARE EDUCATION/TRAINING PROGRAM

## 2022-09-14 PROCEDURE — 83735 ASSAY OF MAGNESIUM: CPT | Performed by: NURSE PRACTITIONER

## 2022-09-14 PROCEDURE — 96372 THER/PROPH/DIAG INJ SC/IM: CPT | Performed by: STUDENT IN AN ORGANIZED HEALTH CARE EDUCATION/TRAINING PROGRAM

## 2022-09-14 PROCEDURE — 36415 COLL VENOUS BLD VENIPUNCTURE: CPT | Performed by: NURSE PRACTITIONER

## 2022-09-14 PROCEDURE — 36415 COLL VENOUS BLD VENIPUNCTURE: CPT | Performed by: STUDENT IN AN ORGANIZED HEALTH CARE EDUCATION/TRAINING PROGRAM

## 2022-09-14 PROCEDURE — 85025 COMPLETE CBC W/AUTO DIFF WBC: CPT | Performed by: NURSE PRACTITIONER

## 2022-09-14 PROCEDURE — 96361 HYDRATE IV INFUSION ADD-ON: CPT

## 2022-09-14 RX ORDER — INSULIN ASPART 100 [IU]/ML
10 INJECTION, SOLUTION INTRAVENOUS; SUBCUTANEOUS
Status: DISCONTINUED | OUTPATIENT
Start: 2022-09-14 | End: 2022-09-15 | Stop reason: HOSPADM

## 2022-09-14 RX ADMIN — INSULIN ASPART 10 UNITS: 100 INJECTION, SOLUTION INTRAVENOUS; SUBCUTANEOUS at 05:09

## 2022-09-14 RX ADMIN — INSULIN ASPART 2 UNITS: 100 INJECTION, SOLUTION INTRAVENOUS; SUBCUTANEOUS at 08:09

## 2022-09-14 RX ADMIN — SODIUM CHLORIDE: 0.9 INJECTION, SOLUTION INTRAVENOUS at 08:09

## 2022-09-14 RX ADMIN — ASPIRIN 81 MG: 81 TABLET, COATED ORAL at 09:09

## 2022-09-14 RX ADMIN — INSULIN DETEMIR 30 UNITS: 100 INJECTION, SOLUTION SUBCUTANEOUS at 08:09

## 2022-09-14 RX ADMIN — HEPARIN SODIUM 5000 UNITS: 5000 INJECTION INTRAVENOUS; SUBCUTANEOUS at 09:09

## 2022-09-14 RX ADMIN — HEPARIN SODIUM 5000 UNITS: 5000 INJECTION INTRAVENOUS; SUBCUTANEOUS at 05:09

## 2022-09-14 RX ADMIN — INSULIN ASPART 10 UNITS: 100 INJECTION, SOLUTION INTRAVENOUS; SUBCUTANEOUS at 12:09

## 2022-09-14 RX ADMIN — AMLODIPINE BESYLATE 5 MG: 5 TABLET ORAL at 09:09

## 2022-09-14 RX ADMIN — INSULIN DETEMIR 30 UNITS: 100 INJECTION, SOLUTION SUBCUTANEOUS at 12:09

## 2022-09-14 RX ADMIN — INSULIN ASPART 10 UNITS: 100 INJECTION, SOLUTION INTRAVENOUS; SUBCUTANEOUS at 09:09

## 2022-09-14 RX ADMIN — HEPARIN SODIUM 5000 UNITS: 5000 INJECTION INTRAVENOUS; SUBCUTANEOUS at 02:09

## 2022-09-14 RX ADMIN — INSULIN ASPART 4 UNITS: 100 INJECTION, SOLUTION INTRAVENOUS; SUBCUTANEOUS at 12:09

## 2022-09-14 RX ADMIN — SODIUM CHLORIDE: 0.9 INJECTION, SOLUTION INTRAVENOUS at 02:09

## 2022-09-14 NOTE — PLAN OF CARE
Problem: Adult Inpatient Plan of Care  Goal: Plan of Care Review  Outcome: Ongoing, Progressing  Flowsheets (Taken 9/14/2022 0815)  Plan of Care Reviewed With: patient  Goal: Patient-Specific Goal (Individualized)  Outcome: Ongoing, Progressing     Problem: Adult Inpatient Plan of Care  Goal: Plan of Care Review  Outcome: Ongoing, Progressing  Flowsheets (Taken 9/14/2022 0815)  Plan of Care Reviewed With: patient     Problem: Adult Inpatient Plan of Care  Goal: Plan of Care Review  Outcome: Ongoing, Progressing  Flowsheets (Taken 9/14/2022 0815)  Plan of Care Reviewed With: patient     Problem: Adult Inpatient Plan of Care  Goal: Patient-Specific Goal (Individualized)  Outcome: Ongoing, Progressing     Problem: Adult Inpatient Plan of Care  Goal: Patient-Specific Goal (Individualized)  Outcome: Ongoing, Progressing     Problem: Diabetes Comorbidity  Goal: Blood Glucose Level Within Targeted Range  Intervention: Monitor and Manage Glycemia  Flowsheets (Taken 9/14/2022 0815)  Glycemic Management:   blood glucose monitored   carbohydrate replacement provided   oral hydration promoted   supplemental insulin given     Problem: Diabetes Comorbidity  Goal: Blood Glucose Level Within Targeted Range  Intervention: Monitor and Manage Glycemia  Flowsheets (Taken 9/14/2022 0815)  Glycemic Management:   blood glucose monitored   carbohydrate replacement provided   oral hydration promoted   supplemental insulin given     Problem: Diabetes Comorbidity  Goal: Blood Glucose Level Within Targeted Range  Intervention: Monitor and Manage Glycemia  Flowsheets (Taken 9/14/2022 0815)  Glycemic Management:   blood glucose monitored   carbohydrate replacement provided   oral hydration promoted   supplemental insulin given     Problem: Diabetes Comorbidity  Goal: Blood Glucose Level Within Targeted Range  Intervention: Monitor and Manage Glycemia  Flowsheets (Taken 9/14/2022 0815)  Glycemic Management:   blood glucose monitored    carbohydrate replacement provided   oral hydration promoted   supplemental insulin given     Problem: Diabetes Comorbidity  Goal: Blood Glucose Level Within Targeted Range  Intervention: Monitor and Manage Glycemia  Flowsheets (Taken 9/14/2022 0815)  Glycemic Management:   blood glucose monitored   carbohydrate replacement provided   oral hydration promoted   supplemental insulin given     Problem: Diabetes Comorbidity  Goal: Blood Glucose Level Within Targeted Range  Intervention: Monitor and Manage Glycemia  Flowsheets (Taken 9/14/2022 0815)  Glycemic Management:   blood glucose monitored   carbohydrate replacement provided   oral hydration promoted   supplemental insulin given     Problem: Diabetes Comorbidity  Goal: Blood Glucose Level Within Targeted Range  Intervention: Monitor and Manage Glycemia  Flowsheets (Taken 9/14/2022 0815)  Glycemic Management:   blood glucose monitored   carbohydrate replacement provided   oral hydration promoted   supplemental insulin given

## 2022-09-14 NOTE — ED PROVIDER NOTES
Encounter Date: 9/13/2022       History     Chief Complaint   Patient presents with    Hyperglycemia     Sent by MD for hyperkalemia & hyperglycemia,      42-year-old male with PMH of uncontrolled diabetes and hypertension presents with abnormal outpatient labs.  Patient's only active symptom is mild nausea which has been present for few days, intermittent, without obvious aggravating or relieving factors, and without associated abdominal pain, vomiting, headache, dizziness, lightheadedness, polyuria, polyphagia.  Patient had an outpatient PCP visit today with subsequent labs performed that showed hyperglycemia and hyperkalemia, and he was instructed to go to the ER.  He denies any chest pain, palpitations, shortness of breath, fever, chills, or syncopal episodes.  He has been out of his insulin for the past week or so but refill date earlier today and he has also been out of his metformin.  Patient states he has had intermittent blurry vision in his right eye but does not actively have this.  He is being followed outpatient for this problem. He does not think he's ever been in DKA.     The history is provided by the patient.   Review of patient's allergies indicates:  No Known Allergies  Past Medical History:   Diagnosis Date    Diabetes mellitus     Hypertension      Past Surgical History:   Procedure Laterality Date    APPENDECTOMY       Family History   Problem Relation Age of Onset    Diabetes Mother      Social History     Tobacco Use    Smoking status: Never    Smokeless tobacco: Never   Substance Use Topics    Alcohol use: Yes     Comment: occasionally     Drug use: No     Review of Systems   Constitutional:  Negative for chills and fever.   HENT:  Negative for congestion and sore throat.    Eyes:  Negative for pain and visual disturbance (chronic, intermittent).   Respiratory:  Negative for cough and shortness of breath.    Cardiovascular:  Negative for chest pain and leg swelling.   Gastrointestinal:   Positive for nausea. Negative for abdominal pain, constipation, diarrhea and vomiting.   Endocrine: Negative for polydipsia and polyuria.   Genitourinary:  Negative for dysuria and hematuria.   Musculoskeletal:  Negative for arthralgias and back pain.   Skin:  Negative for color change and rash.   Neurological:  Negative for dizziness, syncope, weakness and headaches.     Physical Exam     Initial Vitals [09/13/22 1838]   BP Pulse Resp Temp SpO2   (!) 145/91 100 18 97.9 °F (36.6 °C) 99 %      MAP       --         Physical Exam    Nursing note and vitals reviewed.  Constitutional: He appears well-developed and well-nourished. He is not diaphoretic. No distress.   HENT:   Head: Normocephalic and atraumatic.   Eyes: Conjunctivae and EOM are normal. Pupils are equal, round, and reactive to light.   Neck: Neck supple.   Normal range of motion.  Cardiovascular:  Normal rate, regular rhythm, normal heart sounds and intact distal pulses.           No murmur heard.  Pulmonary/Chest: Breath sounds normal. No respiratory distress. He has no wheezes. He has no rhonchi. He has no rales.   No increased work of breathing or tachypnea   Abdominal: Abdomen is soft. He exhibits no distension. There is no abdominal tenderness. There is no rebound and no guarding.   Musculoskeletal:         General: No tenderness or edema.      Cervical back: Normal range of motion and neck supple.     Neurological: He is alert and oriented to person, place, and time. He has normal strength. No sensory deficit.   Vision grossly intact   Skin: Skin is warm and dry. Capillary refill takes less than 2 seconds.       ED Course   Procedures  Labs Reviewed   CBC W/ AUTO DIFFERENTIAL - Abnormal; Notable for the following components:       Result Value    Hemoglobin 13.7 (*)     Hematocrit 38.8 (*)     All other components within normal limits   COMPREHENSIVE METABOLIC PANEL - Abnormal; Notable for the following components:    Sodium 134 (*)     CO2 18 (*)      Glucose 347 (*)     BUN 43 (*)     Creatinine 2.6 (*)     eGFR 31 (*)     All other components within normal limits   URINALYSIS, REFLEX TO URINE CULTURE - Abnormal; Notable for the following components:    Protein, UA 2+ (*)     Glucose, UA 4+ (*)     All other components within normal limits    Narrative:     Specimen Source->Urine   URINALYSIS MICROSCOPIC - Abnormal; Notable for the following components:    Bacteria Few (*)     Hyaline Casts, UA 3 (*)     All other components within normal limits    Narrative:     Specimen Source->Urine   POCT GLUCOSE - Abnormal; Notable for the following components:    POCT Glucose 415 (*)     All other components within normal limits   ISTAT PROCEDURE - Abnormal; Notable for the following components:    POC PH 7.270 (*)     POC PCO2 29.1 (*)     POC PO2 19 (*)     POC HCO3 13.4 (*)     POC SATURATED O2 25 (*)     POC TCO2 14 (*)     All other components within normal limits   POCT GLUCOSE - Abnormal; Notable for the following components:    POCT Glucose 335 (*)     All other components within normal limits   BETA - HYDROXYBUTYRATE, SERUM   SARS-COV-2 RDRP GENE   POCT GLUCOSE MONITORING CONTINUOUS   POCT GLUCOSE MONITORING CONTINUOUS     EKG Readings: (Independently Interpreted)   Normal sinus rhythm at a rate of 96, no STEMI, normal axis and intervals, flipped T-waves in lead 3, unclear if new.  No peaked T-waves or flattened P-waves     Imaging Results              X-Ray Chest AP Portable (Final result)  Result time 09/13/22 21:12:16      Final result by Joan Del Castillo MD (09/13/22 21:12:16)                   Impression:      No acute intrathoracic abnormality detected.      Electronically signed by: Joan Del Castillo  Date:    09/13/2022  Time:    21:12               Narrative:    EXAMINATION:  AP PORTABLE CHEST    CLINICAL HISTORY:  hyperglycemia;    TECHNIQUE:  AP portable chest radiograph was submitted.    COMPARISON:  09/16/2017    FINDINGS:  AP portable chest  radiograph demonstrates a cardiac silhouette within normal limits.  There is no focal consolidation, pneumothorax, or pleural effusion. The lung volumes are slightly low.                                       Medications   insulin regular injection 9 Units 0.09 mL (has no administration in time range)   dextrose 10% bolus 125 mL (has no administration in time range)   dextrose 10% bolus 250 mL (has no administration in time range)   0.9%  NaCl infusion (has no administration in time range)   sodium chloride 0.9% bolus 1,000 mL (0 mLs Intravenous Stopped 9/13/22 1945)   ondansetron injection 4 mg (4 mg Intravenous Given 9/13/22 2214)     Medical Decision Making:   History:   Old Medical Records: I decided to obtain old medical records.  Old Records Summarized: records from clinic visits.       <> Summary of Records: Patient had a potassium of 6.3 and hemoglobin A1c greater than 10 today  Initial Assessment:   42M with uncontrolled diabetes presents with reported hyperglycemia and hyperkalemia, hemodynamically stable and afebrile  - DKA workup  - 1L NS  - EKG  - Zofran  Differential Diagnosis:   DKA, hyperglycemia, UTI, pneumonia, medication non-compliance, electrolyte abnormality  Clinical Tests:   Lab Tests: Ordered and Reviewed  Radiological Study: Ordered and Reviewed  Medical Tests: Ordered and Reviewed  ED Management:  See ED course           ED Course as of 09/13/22 2243   Tue Sep 13, 2022   2029 POCT Glucose(!): 415  DKA workup initiated in triage.  [BD]   2139 X-Ray Chest AP Portable  Chest x-ray shows no acute process such as pneumonia, pneumothorax, or pulmonary edema.    [BD]   2218 CBC auto differential(!)  CBC unremarkable without leukocytosis, significant anemia, or decreased platelets   [BD]   2218 Beta-Hydroxybutyrate: 0.2 [BD]   2219 Urinalysis, Reflex to Urine Culture Urine, Clean Catch(!)  UA shows glucose but no ketones and normal beta-hcg. Pt is not in DKA [BD]   2222 Comprehensive metabolic  panel(!)  CMP shows MYLA with Cr 2.6 (up from 1.3) with persistent hyperglycemia of 347. No anion gap or significant electrolyte abnormality [BD]   2228 ISTAT PROCEDURE(!)  VBG shows mild acidosis with pH 7.27 and pCO2 29, consistent with metabolic acidosis [BD]   2234 POCT Glucose(!): 335 [BD]   2238 Patient is not in DKA but has an MYAL and persistent hyperglycemia, so he will be placed in observation with Hospital Medicine.  He has been updated on the plan and his diagnoses and is in agreement.  He remains hemodynamically stable. [BD]   2240 Patient is a 42-year-old male presenting to the emergency department for evaluation of hyperglycemia, hyperkalemia on outpatient labs.  6.3.  Repeat potassium is 4.4 here.  Patient did not receive any treatment other than fluids.  Likely hemolyzed labs in outpatient setting.  Patient is significantly hyperglycemic but without anion gap, negative beta hydroxybutyrate, pH of 7.27.  Plan to treat with subcutaneous insulin at this time.  Patient is not in DKA but with significant hyperglycemia and MYLA, creatinine 2.6 doubled from baseline 1.3.  No leukocytosis.  Patient afebrile.  UA is not indicative of UTI.  Chest x-ray is unremarkable.  Doubt infectious etiology of hyperglycemia.  Patient will need diabetic education as well as treatment for MYLA.  Plan for observation. [CC]      ED Course User Index  [BD] Deangelo Schmidt MD  [CC] Winston Ramon MD                   Clinical Impression:   Final diagnoses:  [E87.5] Hyperkalemia  [R94.31] EKG abnormality  [E11.65] Hyperglycemia due to diabetes mellitus  [N17.9] MYLA (acute kidney injury) (Primary)      ED Disposition Condition    Observation Stable                Deangelo Schmidt MD  Resident  09/13/22 2240       Deangelo Schmidt MD  Resident  09/13/22 2243

## 2022-09-14 NOTE — SUBJECTIVE & OBJECTIVE
Past Medical History:   Diagnosis Date    Diabetes mellitus     Hypertension        Past Surgical History:   Procedure Laterality Date    APPENDECTOMY         Review of patient's allergies indicates:  No Known Allergies    No current facility-administered medications on file prior to encounter.     Current Outpatient Medications on File Prior to Encounter   Medication Sig    aspirin (ECOTRIN) 81 MG EC tablet Take 81 mg by mouth once daily.    butalbital-acetaminophen-caffeine -40 mg (FIORICET) -40 mg per tablet Take 1-2 tablets by mouth every 6 (six) hours as needed for Pain.    erythromycin (ROMYCIN) ophthalmic ointment Place a 1/2 inch ribbon of ointment into the lower eyelid right eye    glipiZIDE 5 MG TR24 Take 5 mg by mouth 2 (two) times a day.    hydrocodone-acetaminophen 5-325mg (NORCO) 5-325 mg per tablet Take 1 tablet by mouth every 4 (four) hours as needed.    ibuprofen (ADVIL,MOTRIN) 600 MG tablet Take 1 tablet (600 mg total) by mouth every 6 (six) hours as needed for Pain.    insulin aspart protamine-insulin aspart (NOVOLOG 70/30) 100 unit/mL (70-30) InPn pen Inject 20 Units into the skin after dinner.    insulin aspart U-100 (NOVOLOG) 100 unit/mL injection Inject 25 Units into the skin 2 (two) times a day.    insulin detemir U-100 (LEVEMIR) 100 unit/mL injection Inject 30 Units into the skin every evening.    lisinopril 10 MG tablet Take 20 mg by mouth once daily.    meloxicam (MOBIC) 7.5 MG tablet Take 7.5 mg by mouth once daily.    metFORMIN (GLUCOPHAGE) 1000 MG tablet Take 1,000 mg by mouth 2 (two) times daily with meals.    tadalafiL (CIALIS) 10 MG tablet Take 10 mg by mouth daily as needed for Erectile Dysfunction.     Family History       Problem Relation (Age of Onset)    Diabetes Mother          Tobacco Use    Smoking status: Never    Smokeless tobacco: Never   Substance and Sexual Activity    Alcohol use: Yes     Comment: occasionally     Drug use: No    Sexual activity: Not on file      Review of Systems  Constitutional:  Negative for chills and fever.   HENT:  Negative for congestion and sore throat.    Eyes:  Negative for pain and visual disturbance (chronic, intermittent).   Respiratory:  Negative for cough and shortness of breath.    Cardiovascular:  Negative for chest pain and leg swelling.   Gastrointestinal:  Positive for nausea. Negative for abdominal pain, constipation, diarrhea and vomiting.   Endocrine: Negative for polydipsia and polyuria.   Genitourinary:  Negative for dysuria and hematuria.   Musculoskeletal:  Negative for arthralgias and back pain.   Skin:  Negative for color change and rash.   Neurological:  Negative for dizziness, syncope, weakness and headaches.  Objective:     Vital Signs (Most Recent):  Temp: 98.4 °F (36.9 °C) (09/13/22 2215)  Pulse: 91 (09/13/22 2220)  Resp: 18 (09/13/22 2215)  BP: 133/88 (09/13/22 2215)  SpO2: 99 % (09/13/22 2220) Vital Signs (24h Range):  Temp:  [97.9 °F (36.6 °C)-98.4 °F (36.9 °C)] 98.4 °F (36.9 °C)  Pulse:  [] 91  Resp:  [18] 18  SpO2:  [99 %-100 %] 99 %  BP: (133-145)/(88-91) 133/88     Weight: 92.1 kg (203 lb)  Body mass index is 39.65 kg/m².    Physical Exam   Nursing note and vitals reviewed.  Constitutional: He appears well-developed and well-nourished. He is not diaphoretic. No distress.   HENT:   Head: Normocephalic and atraumatic.   Eyes: Conjunctivae and EOM are normal. Pupils are equal, round, and reactive to light.   Neck: Neck supple.   Normal range of motion.  Cardiovascular:  Normal rate, regular rhythm, normal heart sounds and intact distal pulses.           No murmur heard.  Pulmonary/Chest: Breath sounds normal. No respiratory distress. He has no wheezes. He has no rhonchi. He has no rales.   Abdominal: Abdomen is soft. He exhibits no distension. There is no abdominal tenderness. There is no rebound and no guarding.   Musculoskeletal:         General: No tenderness or edema.      Cervical back: Normal range of  motion and neck supple.      Neurological: He is alert and oriented to person, place, and time. He has normal strength. No sensory deficit.   Skin: Skin is warm and dry. Capillary refill takes less than 2 seconds.         Significant Labs: All pertinent labs within the past 24 hours have been reviewed.    Significant Imaging: I have reviewed all pertinent imaging results/findings within the past 24 hours.

## 2022-09-14 NOTE — ASSESSMENT & PLAN NOTE
Most likely secondary to dehydration  Renal function on significantly improving on IV fluid   Continue to renally dose all medications and avoid nephrotoxins

## 2022-09-14 NOTE — H&P
[facility]  History & Physical    Patient Name: Robel Desai  MRN: [id number]  Patient Class: OP- Observation  Admission Date: 9/13/2022  Attending Physician: Dinesh Zaragoza MD   Primary Care Provider: [facility]         Patient information was obtained from patient and ER records.     Subjective:     Principal Problem:MYLA (acute kidney injury)    Chief Complaint:   Chief Complaint   Patient presents with    Hyperglycemia     Sent by MD for hyperkalemia & hyperglycemia,         HPI: Per ED:  42-year-old male with PMH of uncontrolled diabetes and hypertension presents with abnormal outpatient labs.  Patient's only active symptom is mild nausea which has been present for few days, intermittent, without obvious aggravating or relieving factors, and without associated abdominal pain, vomiting, headache, dizziness, lightheadedness, polyuria, polyphagia.  Patient had an outpatient PCP visit today with subsequent labs performed that showed hyperglycemia and hyperkalemia, and he was instructed to go to the ER.  He denies any chest pain, palpitations, shortness of breath, fever, chills, or syncopal episodes.  He has been out of his insulin for the past week or so but refill date earlier today and he has also been out of his metformin.  Patient states he has had intermittent blurry vision in his right eye but does not actively have this.  He is being followed outpatient for this problem. He does not think he's ever been in DKA.         Patient seen and examined. Placed in OBS. Patient states he works out in the sun. He haven't taken his insulin in 3 days due to him running out. He went to his PCP and was deferred to ER.       Past Medical History:   Diagnosis Date    Diabetes mellitus     Hypertension        Past Surgical History:   Procedure Laterality Date    APPENDECTOMY         Review of patient's allergies indicates:  No Known Allergies    No current  facility-administered medications on file prior to encounter.     Current Outpatient Medications on File Prior to Encounter   Medication Sig    aspirin (ECOTRIN) 81 MG EC tablet Take 81 mg by mouth once daily.    butalbital-acetaminophen-caffeine -40 mg (FIORICET) -40 mg per tablet Take 1-2 tablets by mouth every 6 (six) hours as needed for Pain.    erythromycin (ROMYCIN) ophthalmic ointment Place a 1/2 inch ribbon of ointment into the lower eyelid right eye    glipiZIDE 5 MG TR24 Take 5 mg by mouth 2 (two) times a day.    hydrocodone-acetaminophen 5-325mg (NORCO) 5-325 mg per tablet Take 1 tablet by mouth every 4 (four) hours as needed.    ibuprofen (ADVIL,MOTRIN) 600 MG tablet Take 1 tablet (600 mg total) by mouth every 6 (six) hours as needed for Pain.    insulin aspart protamine-insulin aspart (NOVOLOG 70/30) 100 unit/mL (70-30) InPn pen Inject 20 Units into the skin after dinner.    insulin aspart U-100 (NOVOLOG) 100 unit/mL injection Inject 25 Units into the skin 2 (two) times a day.    insulin detemir U-100 (LEVEMIR) 100 unit/mL injection Inject 30 Units into the skin every evening.    lisinopril 10 MG tablet Take 20 mg by mouth once daily.    meloxicam (MOBIC) 7.5 MG tablet Take 7.5 mg by mouth once daily.    metFORMIN (GLUCOPHAGE) 1000 MG tablet Take 1,000 mg by mouth 2 (two) times daily with meals.    tadalafiL (CIALIS) 10 MG tablet Take 10 mg by mouth daily as needed for Erectile Dysfunction.     Family History       Problem Relation (Age of Onset)    Diabetes Mother          Tobacco Use    Smoking status: Never    Smokeless tobacco: Never   Substance and Sexual Activity    Alcohol use: Yes     Comment: occasionally     Drug use: No    Sexual activity: Not on file     Review of Systems  Constitutional:  Negative for chills and fever.   HENT:  Negative for congestion and sore throat.    Eyes:  Negative for pain and visual disturbance (chronic, intermittent).   Respiratory:   Negative for cough and shortness of breath.    Cardiovascular:  Negative for chest pain and leg swelling.   Gastrointestinal:  Positive for nausea. Negative for abdominal pain, constipation, diarrhea and vomiting.   Endocrine: Negative for polydipsia and polyuria.   Genitourinary:  Negative for dysuria and hematuria.   Musculoskeletal:  Negative for arthralgias and back pain.   Skin:  Negative for color change and rash.   Neurological:  Negative for dizziness, syncope, weakness and headaches.  Objective:     Vital Signs (Most Recent):  Temp: 98.4 °F (36.9 °C) (09/13/22 2215)  Pulse: 91 (09/13/22 2220)  Resp: 18 (09/13/22 2215)  BP: 133/88 (09/13/22 2215)  SpO2: 99 % (09/13/22 2220) Vital Signs (24h Range):  Temp:  [97.9 °F (36.6 °C)-98.4 °F (36.9 °C)] 98.4 °F (36.9 °C)  Pulse:  [] 91  Resp:  [18] 18  SpO2:  [99 %-100 %] 99 %  BP: (133-145)/(88-91) 133/88     Weight: 92.1 kg (203 lb)  Body mass index is 39.65 kg/m².    Physical Exam   Nursing note and vitals reviewed.  Constitutional: He appears well-developed and well-nourished. He is not diaphoretic. No distress.   HENT:   Head: Normocephalic and atraumatic.   Eyes: Conjunctivae and EOM are normal. Pupils are equal, round, and reactive to light.   Neck: Neck supple.   Normal range of motion.  Cardiovascular:  Normal rate, regular rhythm, normal heart sounds and intact distal pulses.           No murmur heard.  Pulmonary/Chest: Breath sounds normal. No respiratory distress. He has no wheezes. He has no rhonchi. He has no rales.   Abdominal: Abdomen is soft. He exhibits no distension. There is no abdominal tenderness. There is no rebound and no guarding.   Musculoskeletal:         General: No tenderness or edema.      Cervical back: Normal range of motion and neck supple.      Neurological: He is alert and oriented to person, place, and time. He has normal strength. No sensory deficit.   Skin: Skin is warm and dry. Capillary refill takes less than 2 seconds.          Significant Labs: All pertinent labs within the past 24 hours have been reviewed.    Significant Imaging: I have reviewed all pertinent imaging results/findings within the past 24 hours.    Assessment/Plan:     * Hyperglycemia  Patient's FSGs are uncontrolled due to hyperglycemia on current medication regimen.  Last A1c reviewed-   Lab Results   Component Value Date    HGBA1C 8.7 (H) 04/15/2021     Most recent fingerstick glucose reviewed-   Recent Labs   Lab 09/13/22  1834 09/13/22 2229   POCTGLUCOSE 415* 335*     Current correctional scale  Medium  Maintain anti-hyperglycemic dose as follows-   Antihyperglycemics (From admission, onward)    Start     Stop Route Frequency Ordered    09/13/22 2341  insulin aspart U-100 pen 1-10 Units         -- SubQ Before meals & nightly PRN 09/13/22 2244 09/13/22 2320  insulin detemir U-100 pen 30 Units         -- SubQ Nightly 09/13/22 2320        Consult endocrinology, diabetic educator, nutritionist  [id number]  in the am   Hold po meds       Hyperkalemia  On admission K+ level 4.4      Class 2 obesity in adult  Body mass index is 39.65 kg/m². Morbid obesity complicates all aspects of disease management from diagnostic modalities to treatment. Weight loss encouraged and health benefits explained to patient.         MYLA (acute kidney injury)   Labs reviewed- Renal function/electrolytes with CrCl cannot be calculated (Unknown ideal weight.). according to latest data. Monitor urine output and serial BMP and adjust therapy as needed. Avoid nephrotoxins and renally dose meds for GFR listed above.   On admission creatinine 2.6  IVF   BMP in am       Essential hypertension  BP controlled  Held lisinopril due MYLA  Started Norvasc        VTE Risk Mitigation (From admission, onward)         Ordered     heparin (porcine) injection 5,000 Units  Every 8 hours         09/13/22 2244     IP VTE HIGH RISK PATIENT  Once         09/13/22 2244     Place sequential compression device   Until discontinued         09/13/22 2966                   Ariel Wilkins NP  Department of Hospital Medicine   [facility]

## 2022-09-14 NOTE — ASSESSMENT & PLAN NOTE
Body mass index is 36.18 kg/m². Morbid obesity complicates all aspects of disease management from diagnostic modalities to treatment. Weight loss encouraged and health benefits explained to patient.

## 2022-09-14 NOTE — ASSESSMENT & PLAN NOTE
Body mass index is 39.65 kg/m². Morbid obesity complicates all aspects of disease management from diagnostic modalities to treatment. Weight loss encouraged and health benefits explained to patient.

## 2022-09-14 NOTE — ASSESSMENT & PLAN NOTE
Labs reviewed- Renal function/electrolytes with CrCl cannot be calculated (Unknown ideal weight.). according to latest data. Monitor urine output and serial BMP and adjust therapy as needed. Avoid nephrotoxins and renally dose meds for GFR listed above.   On admission creatinine 2.6  IVF   BMP in am

## 2022-09-14 NOTE — NURSING
[facility]  Nurses Note -- 4 Eyes      9/13/2022       Skin assessed on: Admit      [x] No Pressure Injuries Present    []Prevention Measures Documented    [] Yes LDA  for Pressure Injury Previously documented     [] Yes New Pressure Injury Discovered   [] LDA for New Pressure Injury Added      Attending RN:  Estela Hill, RN     Second RN:  Oliva Alberto LPN

## 2022-09-14 NOTE — PROGRESS NOTES
[facility]  Progress Note    Patient Name: Robel Desai  MRN: [id number]  Patient Class: OP- Observation   Admission Date: 9/13/2022  Length of Stay: 0 days  Attending Physician: Aquilino Barnard MD  Primary Care Provider: Ashish Greer        Subjective:     Principal Problem:Hyperglycemia        HPI:  Per ED:  42-year-old male with PMH of uncontrolled diabetes and hypertension presents with abnormal outpatient labs.  Patient's only active symptom is mild nausea which has been present for few days, intermittent, without obvious aggravating or relieving factors, and without associated abdominal pain, vomiting, headache, dizziness, lightheadedness, polyuria, polyphagia.  Patient had an outpatient PCP visit today with subsequent labs performed that showed hyperglycemia and hyperkalemia, and he was instructed to go to the ER.  He denies any chest pain, palpitations, shortness of breath, fever, chills, or syncopal episodes.  He has been out of his insulin for the past week or so but refill date earlier today and he has also been out of his metformin.  Patient states he has had intermittent blurry vision in his right eye but does not actively have this.  He is being followed outpatient for this problem. He does not think he's ever been in DKA.         Patient seen and examined. Placed in OBS. Patient states he works out in the sun. He haven't taken his insulin in 3 days due to him running out. He went to his PCP and was deferred to ER.       Overview/Hospital Course:  No notes on file    Interval History:  Patient is examined.  He states he continues to feel better and denies any complaints.  Nursing bedside denies any acute events overnight.    Review of Systems  Objective:     Vital Signs (Most Recent):  Temp: 98.1 °F (36.7 °C) (09/14/22 1149)  Pulse: 83 (09/14/22 1149)  Resp: 20 (09/14/22 1149)  BP: 139/88 (09/14/22 1149)  SpO2: 97 % (09/14/22 1149) Vital Signs (24h  Range):  Temp:  [97.6 °F (36.4 °C)-98.4 °F (36.9 °C)] 98.1 °F (36.7 °C)  Pulse:  [] 83  Resp:  [16-20] 20  SpO2:  [97 %-100 %] 97 %  BP: (131-170)/(63-91) 139/88     Weight: 95.6 kg (210 lb 12.2 oz)  Body mass index is 36.18 kg/m².    Intake/Output Summary (Last 24 hours) at 9/14/2022 1413  Last data filed at 9/14/2022 1300  Gross per 24 hour   Intake 360 ml   Output --   Net 360 ml      Physical Exam  Constitutional:       General: He is not in acute distress.     Appearance: Normal appearance. He is obese. He is not ill-appearing, toxic-appearing or diaphoretic.      Comments: Young  male lying in bed comfortably appears to be in NAD   HENT:      Head: Normocephalic and atraumatic.   Eyes:      Extraocular Movements: Extraocular movements intact.      Conjunctiva/sclera: Conjunctivae normal.      Pupils: Pupils are equal, round, and reactive to light.   Cardiovascular:      Rate and Rhythm: Normal rate and regular rhythm.      Pulses: Normal pulses.      Heart sounds: Normal heart sounds. No murmur heard.    No friction rub. No gallop.   Pulmonary:      Effort: Pulmonary effort is normal. No respiratory distress.      Breath sounds: Normal breath sounds. No stridor. No wheezing or rhonchi.   Abdominal:      General: Abdomen is flat.      Palpations: Abdomen is soft.   Musculoskeletal:         General: Normal range of motion.   Skin:     General: Skin is warm and dry.      Capillary Refill: Capillary refill takes less than 2 seconds.   Neurological:      General: No focal deficit present.      Mental Status: He is alert and oriented to person, place, and time. Mental status is at baseline.      Cranial Nerves: No cranial nerve deficit.      Sensory: No sensory deficit.      Motor: No weakness.      Coordination: Coordination normal.      Gait: Gait normal.      Deep Tendon Reflexes: Reflexes normal.   Psychiatric:         Mood and Affect: Mood normal.         Behavior: Behavior normal.         Thought  Content: Thought content normal.         Judgment: Judgment normal.       Significant Labs: All pertinent labs within the past 24 hours have been reviewed.    Significant Imaging: I have reviewed all pertinent imaging results/findings within the past 24 hours.      Assessment/Plan:      * DM with Hyperglycemia  Poorly controlled diabetes secondary to medication noncompliance  Patient's started on basal and bolus insulin, continue to monitor BGL and adjust insulin requirement adequately    MYLA (acute kidney injury)  Most likely secondary to dehydration  Renal function on significantly improving on IV fluid   Continue to renally dose all medications and avoid nephrotoxins      Hyperkalemia  Seems to be seen on outpatient lab      Class 2 obesity in adult  Body mass index is 36.18 kg/m². Morbid obesity complicates all aspects of disease management from diagnostic modalities to treatment. Weight loss encouraged and health benefits explained to patient.         Essential hypertension  BP controlled  Held lisinopril due MYLA  Started Norvasc          VTE Risk Mitigation (From admission, onward)         Ordered     heparin (porcine) injection 5,000 Units  Every 8 hours         09/13/22 2244     IP VTE HIGH RISK PATIENT  Once         09/13/22 2244     Place sequential compression device  Until discontinued         09/13/22 2244                Discharge Planning   FLETCHER:      Code Status: Full Code   Is the patient medically ready for discharge?:     Reason for patient still in hospital (select all that apply): Treatment                     Aquilino Barnard MD  Department of Hospital Medicine   [facility] [place] [facility]

## 2022-09-14 NOTE — NURSING
Received from ED via wheelchair to 406. Awake, alert, oriented, ambulatory. Primary language is Wallisian.

## 2022-09-14 NOTE — ASSESSMENT & PLAN NOTE
Poorly controlled diabetes secondary to medication noncompliance  Patient's started on basal and bolus insulin, continue to monitor BGL and adjust insulin requirement adequately

## 2022-09-14 NOTE — HPI
Per ED:  42-year-old male with PMH of uncontrolled diabetes and hypertension presents with abnormal outpatient labs.  Patient's only active symptom is mild nausea which has been present for few days, intermittent, without obvious aggravating or relieving factors, and without associated abdominal pain, vomiting, headache, dizziness, lightheadedness, polyuria, polyphagia.  Patient had an outpatient PCP visit today with subsequent labs performed that showed hyperglycemia and hyperkalemia, and he was instructed to go to the ER.  He denies any chest pain, palpitations, shortness of breath, fever, chills, or syncopal episodes.  He has been out of his insulin for the past week or so but refill date earlier today and he has also been out of his metformin.  Patient states he has had intermittent blurry vision in his right eye but does not actively have this.  He is being followed outpatient for this problem. He does not think he's ever been in DKA.         Patient seen and examined. Placed in OBS. Patient states he works out in the sun. He haven't taken his insulin in 3 days due to him running out. He went to his PCP and was deferred to ER.

## 2022-09-14 NOTE — ASSESSMENT & PLAN NOTE
Patient's FSGs are uncontrolled due to hyperglycemia on current medication regimen.  Last A1c reviewed-   Lab Results   Component Value Date    HGBA1C 8.7 (H) 04/15/2021     Most recent fingerstick glucose reviewed-   Recent Labs   Lab 09/13/22  1834 09/13/22 2229   POCTGLUCOSE 415* 335*     Current correctional scale  Medium  Maintain anti-hyperglycemic dose as follows-   Antihyperglycemics (From admission, onward)    Start     Stop Route Frequency Ordered    09/13/22 2341  insulin aspart U-100 pen 1-10 Units         -- SubQ Before meals & nightly PRN 09/13/22 2244    09/13/22 2320  insulin detemir U-100 pen 30 Units         -- SubQ Nightly 09/13/22 2320        Consult endocrinology, diabetic educator, nutritionist  [id number]  in the am   Hold po meds

## 2022-09-14 NOTE — SUBJECTIVE & OBJECTIVE
Interval History:  Patient is examined.  He states he continues to feel better and denies any complaints.  Nursing bedside denies any acute events overnight.    Review of Systems  Objective:     Vital Signs (Most Recent):  Temp: 98.1 °F (36.7 °C) (09/14/22 1149)  Pulse: 83 (09/14/22 1149)  Resp: 20 (09/14/22 1149)  BP: 139/88 (09/14/22 1149)  SpO2: 97 % (09/14/22 1149) Vital Signs (24h Range):  Temp:  [97.6 °F (36.4 °C)-98.4 °F (36.9 °C)] 98.1 °F (36.7 °C)  Pulse:  [] 83  Resp:  [16-20] 20  SpO2:  [97 %-100 %] 97 %  BP: (131-170)/(63-91) 139/88     Weight: 95.6 kg (210 lb 12.2 oz)  Body mass index is 36.18 kg/m².    Intake/Output Summary (Last 24 hours) at 9/14/2022 1413  Last data filed at 9/14/2022 1300  Gross per 24 hour   Intake 360 ml   Output --   Net 360 ml      Physical Exam  Constitutional:       General: He is not in acute distress.     Appearance: Normal appearance. He is obese. He is not ill-appearing, toxic-appearing or diaphoretic.      Comments: Young  male lying in bed comfortably appears to be in NAD   HENT:      Head: Normocephalic and atraumatic.   Eyes:      Extraocular Movements: Extraocular movements intact.      Conjunctiva/sclera: Conjunctivae normal.      Pupils: Pupils are equal, round, and reactive to light.   Cardiovascular:      Rate and Rhythm: Normal rate and regular rhythm.      Pulses: Normal pulses.      Heart sounds: Normal heart sounds. No murmur heard.    No friction rub. No gallop.   Pulmonary:      Effort: Pulmonary effort is normal. No respiratory distress.      Breath sounds: Normal breath sounds. No stridor. No wheezing or rhonchi.   Abdominal:      General: Abdomen is flat.      Palpations: Abdomen is soft.   Musculoskeletal:         General: Normal range of motion.   Skin:     General: Skin is warm and dry.      Capillary Refill: Capillary refill takes less than 2 seconds.   Neurological:      General: No focal deficit present.      Mental Status: He is alert  and oriented to person, place, and time. Mental status is at baseline.      Cranial Nerves: No cranial nerve deficit.      Sensory: No sensory deficit.      Motor: No weakness.      Coordination: Coordination normal.      Gait: Gait normal.      Deep Tendon Reflexes: Reflexes normal.   Psychiatric:         Mood and Affect: Mood normal.         Behavior: Behavior normal.         Thought Content: Thought content normal.         Judgment: Judgment normal.       Significant Labs: All pertinent labs within the past 24 hours have been reviewed.    Significant Imaging: I have reviewed all pertinent imaging results/findings within the past 24 hours.

## 2022-09-14 NOTE — NURSING
[facility] [place]  Nurses Note -- 4 Eyes      9/14/2022       Skin assessed on: Wound Wednesday      [x] No Pressure Injuries Present    []Prevention Measures Documented    [] Yes LDA  for Pressure Injury Previously documented     [] Yes New Pressure Injury Discovered   [] LDA for New Pressure Injury Added      Attending RN:  Mariana Fisher RN     Second RN:  EZIO Sanders

## 2022-09-14 NOTE — PLAN OF CARE
09/14/22 1443   Discharge Planning   Assessment Type Discharge Planning Brief Assessment   Resource/Environmental Concerns none   Support Systems Family members   Equipment Currently Used at Home none   Current Living Arrangements home/apartment/condo   Patient/Family Anticipates Transition to home with family   Patient/Family Anticipated Services at Transition none   DME Needed Upon Discharge  none   Discharge Plan A Home with family  (with follow up)

## 2022-09-15 VITALS
DIASTOLIC BLOOD PRESSURE: 74 MMHG | SYSTOLIC BLOOD PRESSURE: 133 MMHG | WEIGHT: 210.75 LBS | HEIGHT: 64 IN | OXYGEN SATURATION: 98 % | HEART RATE: 84 BPM | BODY MASS INDEX: 35.98 KG/M2 | RESPIRATION RATE: 20 BRPM | TEMPERATURE: 98 F

## 2022-09-15 LAB
ALBUMIN SERPL BCP-MCNC: 3 G/DL (ref 3.5–5.2)
ALP SERPL-CCNC: 73 U/L (ref 55–135)
ALT SERPL W/O P-5'-P-CCNC: 18 U/L (ref 10–44)
ANION GAP SERPL CALC-SCNC: 9 MMOL/L (ref 8–16)
AST SERPL-CCNC: 14 U/L (ref 10–40)
BASOPHILS # BLD AUTO: 0.02 K/UL (ref 0–0.2)
BASOPHILS NFR BLD: 0.4 % (ref 0–1.9)
BILIRUB SERPL-MCNC: 0.3 MG/DL (ref 0.1–1)
BUN SERPL-MCNC: 23 MG/DL (ref 6–20)
CALCIUM SERPL-MCNC: 8.9 MG/DL (ref 8.7–10.5)
CHLORIDE SERPL-SCNC: 110 MMOL/L (ref 95–110)
CO2 SERPL-SCNC: 17 MMOL/L (ref 23–29)
CREAT SERPL-MCNC: 1.6 MG/DL (ref 0.5–1.4)
DIFFERENTIAL METHOD: ABNORMAL
EOSINOPHIL # BLD AUTO: 0.1 K/UL (ref 0–0.5)
EOSINOPHIL NFR BLD: 2.6 % (ref 0–8)
ERYTHROCYTE [DISTWIDTH] IN BLOOD BY AUTOMATED COUNT: 12.2 % (ref 11.5–14.5)
EST. GFR  (NO RACE VARIABLE): 55 ML/MIN/1.73 M^2
GLUCOSE SERPL-MCNC: 256 MG/DL (ref 70–110)
HCT VFR BLD AUTO: 34.9 % (ref 40–54)
HGB BLD-MCNC: 12.3 G/DL (ref 14–18)
IMM GRANULOCYTES # BLD AUTO: 0.02 K/UL (ref 0–0.04)
IMM GRANULOCYTES NFR BLD AUTO: 0.4 % (ref 0–0.5)
LYMPHOCYTES # BLD AUTO: 1.9 K/UL (ref 1–4.8)
LYMPHOCYTES NFR BLD: 40.3 % (ref 18–48)
MAGNESIUM SERPL-MCNC: 1.7 MG/DL (ref 1.6–2.6)
MCH RBC QN AUTO: 29.5 PG (ref 27–31)
MCHC RBC AUTO-ENTMCNC: 35.2 G/DL (ref 32–36)
MCV RBC AUTO: 84 FL (ref 82–98)
MONOCYTES # BLD AUTO: 0.3 K/UL (ref 0.3–1)
MONOCYTES NFR BLD: 6 % (ref 4–15)
NEUTROPHILS # BLD AUTO: 2.3 K/UL (ref 1.8–7.7)
NEUTROPHILS NFR BLD: 50.3 % (ref 38–73)
NRBC BLD-RTO: 0 /100 WBC
PHOSPHATE SERPL-MCNC: 2.8 MG/DL (ref 2.7–4.5)
PLATELET # BLD AUTO: 220 K/UL (ref 150–450)
PMV BLD AUTO: 11.1 FL (ref 9.2–12.9)
POCT GLUCOSE: 220 MG/DL (ref 70–110)
POTASSIUM SERPL-SCNC: 4.7 MMOL/L (ref 3.5–5.1)
PROT SERPL-MCNC: 6.4 G/DL (ref 6–8.4)
RBC # BLD AUTO: 4.17 M/UL (ref 4.6–6.2)
SODIUM SERPL-SCNC: 136 MMOL/L (ref 136–145)
WBC # BLD AUTO: 4.66 K/UL (ref 3.9–12.7)

## 2022-09-15 PROCEDURE — 85025 COMPLETE CBC W/AUTO DIFF WBC: CPT | Performed by: NURSE PRACTITIONER

## 2022-09-15 PROCEDURE — G0378 HOSPITAL OBSERVATION PER HR: HCPCS

## 2022-09-15 PROCEDURE — 25000003 PHARM REV CODE 250: Performed by: NURSE PRACTITIONER

## 2022-09-15 PROCEDURE — 84100 ASSAY OF PHOSPHORUS: CPT | Performed by: NURSE PRACTITIONER

## 2022-09-15 PROCEDURE — 83735 ASSAY OF MAGNESIUM: CPT | Performed by: NURSE PRACTITIONER

## 2022-09-15 PROCEDURE — 80053 COMPREHEN METABOLIC PANEL: CPT | Performed by: NURSE PRACTITIONER

## 2022-09-15 PROCEDURE — 96361 HYDRATE IV INFUSION ADD-ON: CPT

## 2022-09-15 RX ORDER — AMLODIPINE BESYLATE 5 MG/1
5 TABLET ORAL DAILY
Qty: 30 TABLET | Refills: 11 | Status: SHIPPED | OUTPATIENT
Start: 2022-09-16 | End: 2023-09-16

## 2022-09-15 RX ADMIN — AMLODIPINE BESYLATE 5 MG: 5 TABLET ORAL at 08:09

## 2022-09-15 RX ADMIN — SODIUM CHLORIDE: 0.9 INJECTION, SOLUTION INTRAVENOUS at 05:09

## 2022-09-15 RX ADMIN — INSULIN ASPART 4 UNITS: 100 INJECTION, SOLUTION INTRAVENOUS; SUBCUTANEOUS at 08:09

## 2022-09-15 RX ADMIN — INSULIN ASPART 10 UNITS: 100 INJECTION, SOLUTION INTRAVENOUS; SUBCUTANEOUS at 08:09

## 2022-09-15 RX ADMIN — ASPIRIN 81 MG: 81 TABLET, COATED ORAL at 08:09

## 2022-09-15 NOTE — HOSPITAL COURSE
Patient presented with significant hyperglycemia and MYLA secondary to dehydration.  He was started on aggressive IV hydration and insulin regimen for his hyperglycemia/diabetes.  His diabetes became well controlled and his renal function significantly improved.  Renal ultrasound was unremarkable for any acute renal abnormality.  Patient eventually was discharged home and advised to follow-up with his PCP to have repeat blood work done within a week  His home lisinopril and metformin were held on discharge and he was advised to follow-up with his PCP to decide when to reinitiate these medications.  He was also advised to stop taking NSAIDs to avoid worsening of his renal function

## 2022-09-15 NOTE — PLAN OF CARE
VSS on RA, NAD, Afebrile, voids without difficulty, ambulatory. Discharge instructions explained and handed to pt, spouse, & daughter at bedside. Pt, spouse, & daughter verbalize understanding. IV access dc. Cardiac monitoring dc, monitor tech notified. Discharged home via wheelchair, surgical mask in place.     Problem: Adult Inpatient Plan of Care  Goal: Plan of Care Review  Outcome: Met  Goal: Patient-Specific Goal (Individualized)  Outcome: Met  Goal: Absence of Hospital-Acquired Illness or Injury  Outcome: Met  Goal: Optimal Comfort and Wellbeing  Outcome: Met  Goal: Readiness for Transition of Care  Outcome: Met     Problem: Diabetes Comorbidity  Goal: Blood Glucose Level Within Targeted Range  Outcome: Met     Problem: Fluid and Electrolyte Imbalance (Acute Kidney Injury/Impairment)  Goal: Fluid and Electrolyte Balance  Outcome: Met     Problem: Oral Intake Inadequate (Acute Kidney Injury/Impairment)  Goal: Optimal Nutrition Intake  Outcome: Met     Problem: Renal Function Impairment (Acute Kidney Injury/Impairment)  Goal: Effective Renal Function  Outcome: Met

## 2022-09-15 NOTE — PROGRESS NOTES
WRITTEN HEALTHCARE DISCHARGE INFORMATION      Things that YOU are RESPONSIBLE for to Manage Your Care At Home:     1. Getting your prescriptions filled.  2. Taking you medications as directed. DO NOT MISS ANY DOSES!  3. Going to your follow-up doctor appointments. This is important because it allows the doctor to monitor your progress and to determine if any changes need to be made to your treatment plan.     If you are unable to make your follow up appointments, please call the number listed and reschedule this appointment.      ____________HELP AT HOME____________________     Experiencing any SIGNS or SYMPTOMS: YOU CAN     Schedule a same day appopintment with your Primary Care Doctor or  you can call [facility] On Call Nurse Care Line for 24/7 assistance at [phone]     If you are experience any signs or symptoms that have become severe, Call 911 and come to your nearest Emergency Room.     Thank you for choosing [facility] and allowing us to care for you.   From your care management team:      You should receive a call from [facility] Discharge Department within 48-72 hours to help manage your care after discharge. Please try to make sure that you answer your phone for this important phone call.        Follow-up Information       Ashish Greer Follow up.    Why: please call clinic to schedule followup post hospital discharge  Contact information:  [place] [id number] [phone]

## 2022-09-15 NOTE — DISCHARGE SUMMARY
[facility]  Discharge Summary      Patient Name: Robel Desai  MRN: [id number]  Patient Class: OP- Observation  Admission Date: 9/13/2022  Hospital Length of Stay: 0 days  Discharge Date and Time:  09/15/2022 11:18 AM  Attending Physician: Aquilino Barnard MD   Discharging Provider: Aquilino Barnard MD  Primary Care Provider: [facility]      HPI:   Per ED:  42-year-old male with PMH of uncontrolled diabetes and hypertension presents with abnormal outpatient labs.  Patient's only active symptom is mild nausea which has been present for few days, intermittent, without obvious aggravating or relieving factors, and without associated abdominal pain, vomiting, headache, dizziness, lightheadedness, polyuria, polyphagia.  Patient had an outpatient PCP visit today with subsequent labs performed that showed hyperglycemia and hyperkalemia, and he was instructed to go to the ER.  He denies any chest pain, palpitations, shortness of breath, fever, chills, or syncopal episodes.  He has been out of his insulin for the past week or so but refill date earlier today and he has also been out of his metformin.  Patient states he has had intermittent blurry vision in his right eye but does not actively have this.  He is being followed outpatient for this problem. He does not think he's ever been in DKA.         Patient seen and examined. Placed in OBS. Patient states he works out in the sun. He haven't taken his insulin in 3 days due to him running out. He went to his PCP and was deferred to ER.       * No surgery found *      Hospital Course:   Patient presented with significant hyperglycemia and MYLA secondary to dehydration.  He was started on aggressive IV hydration and insulin regimen for his hyperglycemia/diabetes.  His diabetes became well controlled and his renal function significantly improved.  Renal ultrasound was unremarkable for any acute renal abnormality.  Patient  eventually was discharged home and advised to follow-up with his PCP to have repeat blood work done within a week  His home lisinopril and metformin were held on discharge and he was advised to follow-up with his PCP to decide when to reinitiate these medications.  He was also advised to stop taking NSAIDs to avoid worsening of his renal function       Goals of Care Treatment Preferences:  Code Status: Full Code      Consults:   Consults (From admission, onward)        Status Ordering Provider     Inpatient consult to Endocrinology  Once        Provider:  Smitha Broderick MD    Completed JENNIFER BLACKWELL     Inpatient consult to Diabetes educator  Once        Provider:  (Not yet assigned)    Acknowledged JENNIFER BLACKWELL     Inpatient consult to Registered Dietitian/Nutritionist  Once        Provider:  (Not yet assigned)    Acknowledged JENNIFER BLACKWELL          * DM with Hyperglycemia  Poorly controlled diabetes secondary to medication noncompliance  Patient strongly stated he still has enough insulin at home  He was advised to continue taking his home insulin and follow up with his PCP and Endocrinology outpatient    MYLA (acute kidney injury)  Most likely secondary to dehydration  Renal function significantly improving on IV fluid   Patient discharged and advised to follow-up with his PCP to have repeat labs done within the next 1-2 weeks    He was also counseled on need to stop taking NSAIDs     Hyperkalemia  Seems to be seen on outpatient lab      Class 2 obesity in adult  Body mass index is 36.18 kg/m². Morbid obesity complicates all aspects of disease management from diagnostic modalities to treatment. Weight loss encouraged and health benefits explained to patient.         Essential hypertension  BP controlled  Held lisinopril due MYLA  Started Norvasc          Final Active Diagnoses:    Diagnosis Date Noted POA    PRINCIPAL PROBLEM:  DM with Hyperglycemia [R73.9] 05/06/2018 Unknown    MYLA (acute  kidney injury) [N17.9] 05/06/2018 Unknown    Hyperkalemia [E87.5] 09/14/2022 Unknown    Class 2 obesity in adult [E66.9] 09/13/2022 Unknown    Essential hypertension [I10] 05/06/2018 Yes      Problems Resolved During this Admission:       Discharged Condition: stable    Disposition: Home or Self Care    Follow Up:   Follow-up Information     Ashish Greer Follow up.    Why: please call clinic to schedule followup post hospital discharge  Contact information:  [place] [id number] [phone]                       Patient Instructions:   No discharge procedures on file.    Significant Diagnostic Studies: Labs:   BMP:   Recent Labs   Lab 09/14/22  0442 09/14/22  1108 09/15/22  0413   * 238* 256*   * 137 136   K 4.3 4.7 4.7    109 110   CO2 17* 20* 17*   BUN 36* 32* 23*   CREATININE 2.0* 1.7* 1.6*   CALCIUM 8.8 9.2 8.9   MG 2.0  --  1.7    and CMP   Recent Labs   Lab 09/13/22  2135 09/14/22 0442 09/14/22  1108 09/15/22  0413   * 134* 137 136   K 4.4 4.3 4.7 4.7    108 109 110   CO2 18* 17* 20* 17*   * 358* 238* 256*   BUN 43* 36* 32* 23*   CREATININE 2.6* 2.0* 1.7* 1.6*   CALCIUM 9.5 8.8 9.2 8.9   PROT 7.5 6.1  --  6.4   ALBUMIN 3.6 2.9*  --  3.0*   BILITOT 0.3 0.3  --  0.3   ALKPHOS 89 75  --  73   AST 17 11  --  14   ALT 17 16  --  18   ANIONGAP 11 9 8 9       Pending Diagnostic Studies:     None         Medications:  Reconciled Home Medications:      Medication List      START taking these medications    amLODIPine 5 MG tablet  Commonly known as: NORVASC  Pitcairn louis tableta (5 mg en total) por vía oral diariamente.  (Take 1 tablet (5 mg total) by mouth once daily.)  Start taking on: September 16, 2022        CONTINUE taking these medications    aspirin 81 MG EC tablet  Commonly known as: ECOTRIN  Take 81 mg by mouth once daily.     butalbital-acetaminophen-caffeine -40 mg -40 mg per tablet  Commonly known as: FIORICET  Take 1-2 tablets  by mouth every 6 (six) hours as needed for Pain.     erythromycin ophthalmic ointment  Commonly known as: ROMYCIN  Place a 1/2 inch ribbon of ointment into the lower eyelid right eye     glipiZIDE 5 MG Tr24  Take 5 mg by mouth 2 (two) times a day.     HYDROcodone-acetaminophen 5-325 mg per tablet  Commonly known as: NORCO  Take 1 tablet by mouth every 4 (four) hours as needed.     insulin aspart protamine-insulin aspart 100 unit/mL (70-30) Inpn pen  Commonly known as: NovoLOG 70/30  Inject 20 Units into the skin after dinner.     semaglutide 0.25 mg or 0.5 mg(2 mg/1.5 mL) pen injector  Commonly known as: OZEMPIC  Inject 0.25 mg into the skin.     tadalafiL 10 MG tablet  Commonly known as: CIALIS  Take 10 mg by mouth daily as needed for Erectile Dysfunction.        STOP taking these medications    ibuprofen 600 MG tablet  Commonly known as: ADVIL,MOTRIN     insulin aspart U-100 100 unit/mL injection  Commonly known as: NovoLOG     insulin detemir U-100 100 unit/mL injection  Commonly known as: Levemir     lisinopriL 10 MG tablet     meloxicam 7.5 MG tablet  Commonly known as: MOBIC     metFORMIN 1000 MG tablet  Commonly known as: GLUCOPHAGE            Indwelling Lines/Drains at time of discharge:   Lines/Drains/Airways     None                 Time spent on the discharge of patient: 35 minutes         Aquilino Barnard MD  Department of Hospital Medicine  [facility]

## 2022-09-15 NOTE — PLAN OF CARE
09/15/22 1101   Final Note   Assessment Type Final Discharge Note   Anticipated Discharge Disposition Home   Hospital Resources/Appts/Education Provided Appointments scheduled and added to AVS   Post-Acute Status   Post-Acute Authorization Other   Other Status No Post-Acute Service Needs   Pts nurse Shu notified that the pt can d/c from CM standpoint.

## 2022-09-15 NOTE — PLAN OF CARE
Problem: Adult Inpatient Plan of Care  Goal: Plan of Care Review  Outcome: Ongoing, Progressing     Problem: Adult Inpatient Plan of Care  Goal: Absence of Hospital-Acquired Illness or Injury  Outcome: Ongoing, Progressing     Problem: Diabetes Comorbidity  Goal: Blood Glucose Level Within Targeted Range  Outcome: Ongoing, Progressing

## 2022-09-15 NOTE — ASSESSMENT & PLAN NOTE
Most likely secondary to dehydration  Renal function significantly improving on IV fluid   Patient discharged and advised to follow-up with his PCP to have repeat labs done within the next 1-2 weeks    He was also counseled on need to stop taking NSAIDs

## 2022-09-15 NOTE — PLAN OF CARE
Problem: Adult Inpatient Plan of Care  Goal: Plan of Care Review  9/15/2022 0559 by Bhavana Bartholomew RN  Outcome: Ongoing, Progressing  9/15/2022 0558 by Bhavana Bartholomew RN  Outcome: Ongoing, Progressing     Problem: Adult Inpatient Plan of Care  Goal: Absence of Hospital-Acquired Illness or Injury  9/15/2022 0559 by Bhavana Bartholomew RN  Outcome: Ongoing, Progressing  9/15/2022 0558 by Bhavana Bartholomew RN  Outcome: Ongoing, Progressing     Problem: Diabetes Comorbidity  Goal: Blood Glucose Level Within Targeted Range  9/15/2022 0559 by Bhavana Bartholomew RN  Outcome: Ongoing, Progressing  9/15/2022 0558 by Bhavana Bartholomew RN  Outcome: Ongoing, Progressing

## 2022-09-15 NOTE — ASSESSMENT & PLAN NOTE
Poorly controlled diabetes secondary to medication noncompliance  Patient strongly stated he still has enough insulin at home  He was advised to continue taking his home insulin and follow up with his PCP and Endocrinology outpatient

## 2022-09-15 NOTE — CONSULTS
Reason for consult : Uncontrolled diabetes       HPI : Mr Snyder is a 42 yr old man with diabetes type 2 uncontrolled was admitted due to hyperkalemia and high blood sugar level. On arrival his BG was 347 .   He was on Novolog 70/30 30 units bid , Metformin 1000 mg bid and Glipizide 5 mg daily.   He rarely checks his blood sugar.  Clinically he was symptomatic . Prior to coming to ED , he had missed the insulin for few days .     Past Medical History:   Diagnosis Date    Diabetes mellitus     Hypertension      Past Surgical History:   Procedure Laterality Date    APPENDECTOMY       Social History     Socioeconomic History    Marital status: Single   Tobacco Use    Smoking status: Never    Smokeless tobacco: Never   Substance and Sexual Activity    Alcohol use: Yes     Comment: occasionally     Drug use: No     Family History   Problem Relation Age of Onset    Diabetes Mother      No current facility-administered medications on file prior to encounter.     Current Outpatient Medications on File Prior to Encounter   Medication Sig Dispense Refill    semaglutide (OZEMPIC) 0.25 mg or 0.5 mg(2 mg/1.5 mL) pen injector Inject 0.25 mg into the skin.      aspirin (ECOTRIN) 81 MG EC tablet Take 81 mg by mouth once daily.      butalbital-acetaminophen-caffeine -40 mg (FIORICET) -40 mg per tablet Take 1-2 tablets by mouth every 6 (six) hours as needed for Pain. 20 tablet 0    erythromycin (ROMYCIN) ophthalmic ointment Place a 1/2 inch ribbon of ointment into the lower eyelid right eye 1 Tube 0    glipiZIDE 5 MG TR24 Take 5 mg by mouth 2 (two) times a day.      hydrocodone-acetaminophen 5-325mg (NORCO) 5-325 mg per tablet Take 1 tablet by mouth every 4 (four) hours as needed. 20 tablet 0    ibuprofen (ADVIL,MOTRIN) 600 MG tablet Take 1 tablet (600 mg total) by mouth every 6 (six) hours as needed for Pain. 20 tablet 0    insulin aspart protamine-insulin aspart (NOVOLOG 70/30) 100 unit/mL (70-30) InPn pen Inject 20  Units into the skin after dinner.      insulin aspart U-100 (NOVOLOG) 100 unit/mL injection Inject 25 Units into the skin 2 (two) times a day.      insulin detemir U-100 (LEVEMIR) 100 unit/mL injection Inject 30 Units into the skin every evening.      lisinopril 10 MG tablet Take 20 mg by mouth once daily.      meloxicam (MOBIC) 7.5 MG tablet Take 7.5 mg by mouth once daily.      metFORMIN (GLUCOPHAGE) 1000 MG tablet Take 1,000 mg by mouth 2 (two) times daily with meals.      tadalafiL (CIALIS) 10 MG tablet Take 10 mg by mouth daily as needed for Erectile Dysfunction.       Review of patient's allergies indicates:  No Known Allergies    Review of Systems    Constitutional: Negative for activity change, appetite change  HENT: Negative for congestion.   Eyes: Negative for discharge and visual disturbance.+ blurry vision   Respiratory: Negative for cough, chest tightness and shortness of breath.    Cardiovascular: Negative for chest pain, palpitations and leg swelling.   Gastrointestinal: Negative for abdominal distention.   Endocrine: Negative for cold intolerance, heat intolerance, ++ polydipsia, ++polyuria.   Genitourinary: Negative for frequency and decreased urine volume.  Neurological: Negative for dizziness, tremors, speech difficulty, weakness, light-headedness, numbness and headaches.  Hematological: Negative for adenopathy.  Psychiatric/Behavioral: Negative for agitation.     Physical Exam   Vitals:    09/14/22 1924   BP: (!) 165/91   Pulse: 92   Resp: 18   Temp: 98.5 °F (36.9 °C)       Constitutional:  oriented to person, place, and time.  No distress.   HENT: Head: Normocephalic and atraumatic.   Neck: Normal range of motion. Neck supple. .  Cardiovascular: Normal rate and regular rhythm.   Pulmonary/Chest: Effort normal and breath sounds normal.   Abdominal: Soft. Bowel sounds are normal. Non distended and non tender.    Musculoskeletal:  exhibits no edema    Neurological: alert and oriented to person,  place, and time.   Skin: Skin is warm and dry.   Psychiatric:  has a normal mood and affect.    Latest Reference Range & Units 09/16/17 10:04 05/06/18 06:27 04/15/21 00:00 09/13/22 21:35   Hemoglobin A1C External 4.0 - 5.6 %  9.3 (H) 8.7 (H) (E) 11.0 (H)   Estimated Avg Glucose 68 - 131 mg/dL  220 (H)  269 (H)   Beta-Hydroxybutyrate 0.0 - 0.5 mmol/L    0.2   TSH 0.400 - 4.000 uIU/mL 1.163        A/P :     Mr Desai is a 42 yr old man seen today for     Uncontrolled diabetes type 2 with hyperglycemia   - Home regimen was Novolog 70/30 30 units bid , Metformin 1000 mg bid and Glipizide 5 mg daily   - Currently on Levemir 30 units at night and Novolog 10 units tid.   -BG levels are much better   -Advised him that he need to check his BG at home and also on the goals.   - SMBG : before meals and bedtime   - Advised to keep a log .   - MYLA improving .   -At the time of discharge please hold metformin if the creatinine is > 1.5.   -Discussed the goals of treatment .   -Plan discussed with patients son     Thank you for involving me in the care of this patient.     Smitha Broderick M.D.  Endocrinology     [address], [address]  [place] [address]  [phone] ([facility])  [phone] (Fax

## 2022-09-25 ENCOUNTER — HOSPITAL ENCOUNTER (EMERGENCY)
Facility: HOSPITAL | Age: 42
Discharge: HOME | End: 2022-09-25
Attending: EMERGENCY MEDICINE
Payer: MEDICAID

## 2022-09-25 VITALS
OXYGEN SATURATION: 100 % | HEART RATE: 97 BPM | TEMPERATURE: 98 F | HEIGHT: 64 IN | WEIGHT: 200 LBS | SYSTOLIC BLOOD PRESSURE: 172 MMHG | BODY MASS INDEX: 34.15 KG/M2 | DIASTOLIC BLOOD PRESSURE: 90 MMHG | RESPIRATION RATE: 18 BRPM

## 2022-09-25 DIAGNOSIS — E86.0 DEHYDRATION: Primary | ICD-10-CM

## 2022-09-25 DIAGNOSIS — R73.9 HYPERGLYCEMIA: ICD-10-CM

## 2022-09-25 DIAGNOSIS — R11.0 NAUSEA: ICD-10-CM

## 2022-09-25 DIAGNOSIS — N17.9 AKI (ACUTE KIDNEY INJURY): ICD-10-CM

## 2022-09-25 LAB
ALBUMIN SERPL BCP-MCNC: 3.4 G/DL (ref 3.5–5.2)
ALBUMIN SERPL BCP-MCNC: 3.8 G/DL (ref 3.5–5.2)
ALP SERPL-CCNC: 69 U/L (ref 55–135)
ALP SERPL-CCNC: 72 U/L (ref 55–135)
ALT SERPL W/O P-5'-P-CCNC: 19 U/L (ref 10–44)
ALT SERPL W/O P-5'-P-CCNC: 22 U/L (ref 10–44)
ANION GAP SERPL CALC-SCNC: 5 MMOL/L (ref 8–16)
ANION GAP SERPL CALC-SCNC: 6 MMOL/L (ref 8–16)
AST SERPL-CCNC: 15 U/L (ref 10–40)
AST SERPL-CCNC: 17 U/L (ref 10–40)
B-OH-BUTYR BLD STRIP-SCNC: 0 MMOL/L (ref 0–0.5)
BACTERIA #/AREA URNS HPF: NORMAL /HPF
BASOPHILS # BLD AUTO: 0.02 K/UL (ref 0–0.2)
BASOPHILS NFR BLD: 0.3 % (ref 0–1.9)
BILIRUB SERPL-MCNC: 0.3 MG/DL (ref 0.1–1)
BILIRUB SERPL-MCNC: 0.3 MG/DL (ref 0.1–1)
BILIRUB UR QL STRIP: NEGATIVE
BUN SERPL-MCNC: 31 MG/DL (ref 6–20)
BUN SERPL-MCNC: 34 MG/DL (ref 6–20)
CALCIUM SERPL-MCNC: 10 MG/DL (ref 8.7–10.5)
CALCIUM SERPL-MCNC: 9.3 MG/DL (ref 8.7–10.5)
CHLORIDE SERPL-SCNC: 114 MMOL/L (ref 95–110)
CHLORIDE SERPL-SCNC: 115 MMOL/L (ref 95–110)
CK SERPL-CCNC: 127 U/L (ref 20–200)
CLARITY UR: CLEAR
CO2 SERPL-SCNC: 19 MMOL/L (ref 23–29)
CO2 SERPL-SCNC: 20 MMOL/L (ref 23–29)
COLOR UR: YELLOW
CREAT SERPL-MCNC: 1.5 MG/DL (ref 0.5–1.4)
CREAT SERPL-MCNC: 1.8 MG/DL (ref 0.5–1.4)
DIFFERENTIAL METHOD: ABNORMAL
EOSINOPHIL # BLD AUTO: 0.1 K/UL (ref 0–0.5)
EOSINOPHIL NFR BLD: 1.9 % (ref 0–8)
ERYTHROCYTE [DISTWIDTH] IN BLOOD BY AUTOMATED COUNT: 12.4 % (ref 11.5–14.5)
EST. GFR  (NO RACE VARIABLE): 48 ML/MIN/1.73 M^2
EST. GFR  (NO RACE VARIABLE): 60 ML/MIN/1.73 M^2
GLUCOSE SERPL-MCNC: 176 MG/DL (ref 70–110)
GLUCOSE SERPL-MCNC: 74 MG/DL (ref 70–110)
GLUCOSE UR QL STRIP: ABNORMAL
HCT VFR BLD AUTO: 37.9 % (ref 40–54)
HGB BLD-MCNC: 13.3 G/DL (ref 14–18)
HGB UR QL STRIP: ABNORMAL
HYALINE CASTS #/AREA URNS LPF: 0 /LPF
IMM GRANULOCYTES # BLD AUTO: 0.01 K/UL (ref 0–0.04)
IMM GRANULOCYTES NFR BLD AUTO: 0.2 % (ref 0–0.5)
KETONES UR QL STRIP: NEGATIVE
LEUKOCYTE ESTERASE UR QL STRIP: NEGATIVE
LYMPHOCYTES # BLD AUTO: 1.8 K/UL (ref 1–4.8)
LYMPHOCYTES NFR BLD: 28.7 % (ref 18–48)
MAGNESIUM SERPL-MCNC: 2.1 MG/DL (ref 1.6–2.6)
MCH RBC QN AUTO: 30 PG (ref 27–31)
MCHC RBC AUTO-ENTMCNC: 35.1 G/DL (ref 32–36)
MCV RBC AUTO: 86 FL (ref 82–98)
MICROSCOPIC COMMENT: NORMAL
MONOCYTES # BLD AUTO: 0.5 K/UL (ref 0.3–1)
MONOCYTES NFR BLD: 7.5 % (ref 4–15)
NEUTROPHILS # BLD AUTO: 4 K/UL (ref 1.8–7.7)
NEUTROPHILS NFR BLD: 61.4 % (ref 38–73)
NITRITE UR QL STRIP: NEGATIVE
NRBC BLD-RTO: 0 /100 WBC
PH UR STRIP: 6 [PH] (ref 5–8)
PHOSPHATE SERPL-MCNC: 2.2 MG/DL (ref 2.7–4.5)
PLATELET # BLD AUTO: 277 K/UL (ref 150–450)
PMV BLD AUTO: 9.9 FL (ref 9.2–12.9)
POCT GLUCOSE: 209 MG/DL (ref 70–110)
POTASSIUM SERPL-SCNC: 4.9 MMOL/L (ref 3.5–5.1)
POTASSIUM SERPL-SCNC: 5.6 MMOL/L (ref 3.5–5.1)
PROT SERPL-MCNC: 6.5 G/DL (ref 6–8.4)
PROT SERPL-MCNC: 7.6 G/DL (ref 6–8.4)
PROT UR QL STRIP: ABNORMAL
RBC # BLD AUTO: 4.43 M/UL (ref 4.6–6.2)
RBC #/AREA URNS HPF: 0 /HPF (ref 0–4)
SODIUM SERPL-SCNC: 139 MMOL/L (ref 136–145)
SODIUM SERPL-SCNC: 140 MMOL/L (ref 136–145)
SP GR UR STRIP: 1.01 (ref 1–1.03)
SQUAMOUS #/AREA URNS HPF: 0 /HPF
URN SPEC COLLECT METH UR: ABNORMAL
UROBILINOGEN UR STRIP-ACNC: NEGATIVE EU/DL
WBC # BLD AUTO: 6.42 K/UL (ref 3.9–12.7)
WBC #/AREA URNS HPF: 0 /HPF (ref 0–5)

## 2022-09-25 PROCEDURE — 82010 KETONE BODYS QUAN: CPT | Performed by: EMERGENCY MEDICINE

## 2022-09-25 PROCEDURE — 93010 ELECTROCARDIOGRAM REPORT: CPT | Mod: ,,, | Performed by: INTERNAL MEDICINE

## 2022-09-25 PROCEDURE — 85025 COMPLETE CBC W/AUTO DIFF WBC: CPT | Performed by: EMERGENCY MEDICINE

## 2022-09-25 PROCEDURE — 93010 EKG 12-LEAD: ICD-10-PCS | Mod: ,,, | Performed by: INTERNAL MEDICINE

## 2022-09-25 PROCEDURE — 84100 ASSAY OF PHOSPHORUS: CPT | Performed by: EMERGENCY MEDICINE

## 2022-09-25 PROCEDURE — 83735 ASSAY OF MAGNESIUM: CPT | Performed by: EMERGENCY MEDICINE

## 2022-09-25 PROCEDURE — 99284 EMERGENCY DEPT VISIT MOD MDM: CPT | Mod: 25

## 2022-09-25 PROCEDURE — 96374 THER/PROPH/DIAG INJ IV PUSH: CPT

## 2022-09-25 PROCEDURE — 93005 ELECTROCARDIOGRAM TRACING: CPT

## 2022-09-25 PROCEDURE — 96375 TX/PRO/DX INJ NEW DRUG ADDON: CPT

## 2022-09-25 PROCEDURE — 81000 URINALYSIS NONAUTO W/SCOPE: CPT | Performed by: EMERGENCY MEDICINE

## 2022-09-25 PROCEDURE — 80053 COMPREHEN METABOLIC PANEL: CPT | Performed by: EMERGENCY MEDICINE

## 2022-09-25 PROCEDURE — 82962 GLUCOSE BLOOD TEST: CPT

## 2022-09-25 PROCEDURE — 96361 HYDRATE IV INFUSION ADD-ON: CPT

## 2022-09-25 PROCEDURE — 63600175 PHARM REV CODE 636 W HCPCS: Performed by: EMERGENCY MEDICINE

## 2022-09-25 PROCEDURE — 82550 ASSAY OF CK (CPK): CPT | Performed by: EMERGENCY MEDICINE

## 2022-09-25 RX ORDER — KETOROLAC TROMETHAMINE 30 MG/ML
15 INJECTION, SOLUTION INTRAMUSCULAR; INTRAVENOUS
Status: COMPLETED | OUTPATIENT
Start: 2022-09-25 | End: 2022-09-25

## 2022-09-25 RX ORDER — METFORMIN HYDROCHLORIDE 1000 MG/1
1000 TABLET ORAL 2 TIMES DAILY WITH MEALS
COMMUNITY

## 2022-09-25 RX ORDER — ONDANSETRON 4 MG/1
4 TABLET, FILM COATED ORAL EVERY 8 HOURS PRN
Qty: 12 TABLET | Refills: 0 | Status: SHIPPED | OUTPATIENT
Start: 2022-09-25

## 2022-09-25 RX ORDER — PROCHLORPERAZINE EDISYLATE 5 MG/ML
10 INJECTION INTRAMUSCULAR; INTRAVENOUS
Status: COMPLETED | OUTPATIENT
Start: 2022-09-25 | End: 2022-09-25

## 2022-09-25 RX ORDER — METHOCARBAMOL 500 MG/1
500 TABLET, FILM COATED ORAL 3 TIMES DAILY PRN
Qty: 30 TABLET | Refills: 0 | Status: SHIPPED | OUTPATIENT
Start: 2022-09-25 | End: 2022-09-30

## 2022-09-25 RX ADMIN — SODIUM CHLORIDE, SODIUM LACTATE, POTASSIUM CHLORIDE, AND CALCIUM CHLORIDE 1000 ML: .6; .31; .03; .02 INJECTION, SOLUTION INTRAVENOUS at 11:09

## 2022-09-25 RX ADMIN — KETOROLAC TROMETHAMINE 15 MG: 30 INJECTION, SOLUTION INTRAMUSCULAR at 08:09

## 2022-09-25 RX ADMIN — SODIUM CHLORIDE, SODIUM LACTATE, POTASSIUM CHLORIDE, AND CALCIUM CHLORIDE 1000 ML: .6; .31; .03; .02 INJECTION, SOLUTION INTRAVENOUS at 08:09

## 2022-09-25 RX ADMIN — PROCHLORPERAZINE EDISYLATE 10 MG: 5 INJECTION INTRAMUSCULAR; INTRAVENOUS at 08:09

## 2022-09-25 NOTE — ED PROVIDER NOTES
"Encounter Date: 9/25/2022    SCRIBE #1 NOTE: I, Estelafabiola Canas, am scribing for, and in the presence of,  Godwin Nicholson Jr., MD. I have scribed the following portions of the note - Other sections scribed: HPI, ROS.     History     Chief Complaint   Patient presents with    Dizziness    Nausea    Neck Pain     Pt c/o neck pain accompanied by nausea and dizziness x 1 week. Pt states these symptoms come on soon after injecting himself with his new insulin. Pt denies chest pain, sob, v/d. CBG in triage 209.     This 42 y.o male, with a medical history of Diabetes mellitus and Hypertension, presents to the ED c/o neck pain with associated nausea. Pt reports that he was initially seen this ED 1 week ago for dehydration and was subsequently admitted into the hospital for 2 days. He states that upon being discharged from the hospital he was supposed to start taking 2x medications, but only took 1 as he was not aware that he was to take both. Pt notes that he has since been experiencing posterior neck pain (mainly to right side) radiating into the back of the head, nausea, and pain as well as a weakness to the bilateral legs (described as feeling "heavy"). Pt reports that he experiences the symptoms when at work, noting that he works outside as a  and is typically ambulating a lot. No recent trauma. Additionally, he reports that he has been experiencing diarrhea and constipation. No history of DVT or PE.  He denies chest pain, shortness of breath, abdominal pain or any other associated symptoms. No alleviating factors.    The history is provided by the patient. The history is limited by a language barrier (Patient is Northern Irish speaking). A  was used ( ID#: [id number]).   Review of patient's allergies indicates:  No Known Allergies  Past Medical History:   Diagnosis Date    Diabetes mellitus     Hypertension      Past Surgical History:   Procedure Laterality Date    APPENDECTOMY   "     Family History   Problem Relation Age of Onset    Diabetes Mother      Social History     Tobacco Use    Smoking status: Never    Smokeless tobacco: Never   Substance Use Topics    Alcohol use: Yes     Comment: occasionally     Drug use: No     Review of Systems   Constitutional:  Negative for fever.   HENT:  Negative for sore throat.    Eyes:  Negative for visual disturbance.   Respiratory:  Negative for shortness of breath.    Cardiovascular:  Negative for chest pain.   Gastrointestinal:  Positive for constipation, diarrhea and nausea. Negative for abdominal pain.   Genitourinary:  Negative for dysuria.   Musculoskeletal:  Positive for neck pain (posterior; radiating into the back of the head). Negative for back pain.        (+) bilateral leg pain   Skin:  Negative for rash.   Neurological:  Positive for weakness (to the bilateral legs).     Physical Exam     Initial Vitals [09/25/22 0736]   BP Pulse Resp Temp SpO2   (!) 167/81 102 19 98.1 °F (36.7 °C) 99 %      MAP       --         Physical Exam    Nursing note and vitals reviewed.  Constitutional: He appears well-developed and well-nourished. He is not diaphoretic. No distress.   HENT:   Head: Normocephalic and atraumatic.   Right Ear: External ear normal.   Left Ear: External ear normal.   Nose: Nose normal.   Mouth/Throat: Oropharynx is clear and moist.   Eyes: Conjunctivae and EOM are normal. Pupils are equal, round, and reactive to light. Right eye exhibits no discharge. Left eye exhibits no discharge. No scleral icterus.   Neck: Neck supple. No JVD present.   There is discomfort in the posterior right cervical paraspinous muscles.  No midline bony tenderness, crepitus, or step-offs.  Neck has full range of motion.  There is no nuchal rigidity.   Normal range of motion.  Cardiovascular:  Normal rate, regular rhythm, normal heart sounds and intact distal pulses.     Exam reveals no gallop and no friction rub.       No murmur heard.  Pulmonary/Chest:  Breath sounds normal. No stridor. No respiratory distress. He has no wheezes. He has no rhonchi. He has no rales. He exhibits no tenderness.   Abdominal: Abdomen is soft. Bowel sounds are normal. He exhibits no distension and no mass. There is no abdominal tenderness. There is no rebound and no guarding.   Musculoskeletal:         General: No tenderness or edema. Normal range of motion.      Cervical back: Normal range of motion and neck supple.     Neurological: He is alert and oriented to person, place, and time. He has normal strength. No cranial nerve deficit.   Patient is alert and oriented person, place, time, events.  Strength is equal intact in all 4 extremities.  Face is symmetric.  There is no dysarthria.  There is no aphasia.   Skin: Skin is warm and dry. No rash noted. No erythema. No pallor.   Psychiatric: He has a normal mood and affect. His behavior is normal. Judgment and thought content normal.       ED Course   Procedures  Labs Reviewed   URINALYSIS, REFLEX TO URINE CULTURE - Abnormal; Notable for the following components:       Result Value    Protein, UA 2+ (*)     Glucose, UA 2+ (*)     Occult Blood UA Trace (*)     All other components within normal limits    Narrative:     Specimen Source->Urine   CBC W/ AUTO DIFFERENTIAL - Abnormal; Notable for the following components:    RBC 4.43 (*)     Hemoglobin 13.3 (*)     Hematocrit 37.9 (*)     All other components within normal limits   COMPREHENSIVE METABOLIC PANEL - Abnormal; Notable for the following components:    Potassium 5.6 (*)     Chloride 114 (*)     CO2 19 (*)     Glucose 176 (*)     BUN 34 (*)     Creatinine 1.8 (*)     Anion Gap 6 (*)     eGFR 48 (*)     All other components within normal limits   PHOSPHORUS - Abnormal; Notable for the following components:    Phosphorus 2.2 (*)     All other components within normal limits   COMPREHENSIVE METABOLIC PANEL - Abnormal; Notable for the following components:    Chloride 115 (*)     CO2 20  (*)     BUN 31 (*)     Creatinine 1.5 (*)     Albumin 3.4 (*)     Anion Gap 5 (*)     All other components within normal limits    Narrative:     Please draw after finished with IVF boluses.   POCT GLUCOSE - Abnormal; Notable for the following components:    POCT Glucose 209 (*)     All other components within normal limits   BETA - HYDROXYBUTYRATE, SERUM   CK   MAGNESIUM   URINALYSIS MICROSCOPIC    Narrative:     Specimen Source->Urine     EKG Readings: (Independently Interpreted)   Initial Reading: No STEMI. Ectopy: No Ectopy.   EKG reviewed and interpreted by me shows sinus rhythm at a rate of 88 beats per minute.  The ND, QRS, QTC intervals are within normal limits.  There is normal axis.  There is normal R-wave progression.  There are no ST segment or T-wave ischemic findings.       Imaging Results    None          Medications   lactated ringers bolus 1,000 mL (0 mLs Intravenous Stopped 9/25/22 1100)   lactated ringers bolus 1,000 mL (0 mLs Intravenous Stopped 9/25/22 1100)   ketorolac injection 15 mg (15 mg Intravenous Given 9/25/22 0834)   prochlorperazine injection Soln 10 mg (10 mg Intravenous Given 9/25/22 0834)   lactated ringers bolus 1,000 mL (0 mLs Intravenous Stopped 9/25/22 1210)     Medical Decision Making:   ED Management:  This is the emergent evaluation of a 42-year-old male presents emergency department for evaluation of right-sided neck pain radiating into the right base of the skull as well as nausea and weakness.  Differential diagnosis at the time of initial evaluation included, but was not limited to:  Dehydration, metabolic derangement, rhabdomyolysis, cervical strain, diabetic ketoacidosis.    Reviewed the patient's medical records.  Patient was admitted for similar presentation in the setting of likely dehydration and acute kidney injury.  Today, he has mild acute kidney injury which is favored to be prerenal.  He has hyperglycemia but this and is metabolic derangement all improved with  IV fluids.  There is no evidence to suggest DKA or HHS.  The patient is feeling much better after IV fluids and antiemetic medications.  I think he is stable for discharge.  There is no evidence of rhabdomyolysis or other concerning findings to require repeat admission.  He is tolerating oral fluids without difficulty.  The patient's discharge was facilitated with  [id number].  All questions answered.  Advised follow-up with PCP this week and return for any new or worsening symptoms such as intractable vomiting, severe weakness, chest pain, shortness of breath, fever, chills.  Advised drinking plenty of fluids during the day, especially while working.        Scribe Attestation:   Scribe #1: I performed the above scribed service and the documentation accurately describes the services I performed. I attest to the accuracy of the note.                   Clinical Impression:   Final diagnoses:  [R11.0] Nausea  [E86.0] Dehydration (Primary)  [R73.9] Hyperglycemia  [N17.9] MYLA (acute kidney injury)        ED Disposition Condition    Discharge Stable          ED Prescriptions       Medication Sig Dispense Start Date End Date Auth. Provider    ondansetron (ZOFRAN) 4 MG tablet Take 1 tablet (4 mg total) by mouth every 8 (eight) hours as needed for Nausea. 12 tablet 9/25/2022 -- Godwin Nicholson Jr., MD    methocarbamoL (ROBAXIN) 500 MG Tab Take 1 tablet (500 mg total) by mouth 3 (three) times daily as needed (foot pain). 30 tablet 9/25/2022 9/30/2022 Godwin Nicholson Jr., MD          Follow-up Information       Follow up With Specialties Details Why Contact [facility]  Schedule an appointment as soon as possible for a visit in 3 days  [address] [facility] [address]  [phone]              I, Godwin Nicholson MD, personally performed the services described in this documentation. All medical record entries made by the scribe were at my direction and in my presence. I have reviewed the  chart and agree that the record reflects my personal performance and is accurate and complete.      Godwin Nicholson Jr., MD  09/25/22 1543

## 2022-09-25 NOTE — DISCHARGE INSTRUCTIONS
Please drink plenty of fluids during the day.  Please return if you get worse or if new symptoms develop.  Please follow-up with your regular doctor this week for further evaluation.

## 2022-09-25 NOTE — ED TRIAGE NOTES
Patient reports to ED for complaints of headache, dizziness, neck pain, and legs feeling heavy while at work. Reports being hospitalized here last week for 2 days and was discharged with 2 meds but was only taking one of the medications because wasn't told to be taking both medications. Reports that the symptoms started after his discharge from hospital. States after standing for 1-2 hours lower legs starting hurting and feeling tired. Denies history of blood clots.        # [id number]

## 2022-12-19 PROBLEM — N17.9 AKI (ACUTE KIDNEY INJURY): Status: RESOLVED | Noted: 2018-05-06 | Resolved: 2022-12-19

## 2024-05-03 ENCOUNTER — OCCUPATIONAL HEALTH (OUTPATIENT)
Dept: URGENT CARE | Facility: CLINIC | Age: 44
End: 2024-05-03

## 2024-05-03 DIAGNOSIS — Z02.1 PRE-EMPLOYMENT EXAMINATION: ICD-10-CM

## 2024-05-03 DIAGNOSIS — Z02.83 ENCOUNTER FOR DRUG SCREENING: Primary | ICD-10-CM

## 2024-05-03 DIAGNOSIS — Z02.1 PRE-EMPLOYMENT EXAMINATION: Primary | ICD-10-CM

## 2024-05-03 LAB
BREATH ALCOHOL: 0
CTP QC/QA: YES
CTP QC/QA: YES
GLUCOSE SERPL-MCNC: >445 MG/DL (ref 70–110)
GLUCOSE SERPL-MCNC: >445 MG/DL (ref 70–110)
POC 10 PANEL DRUG SCREEN: NEGATIVE
POC 10 PANEL DRUG SCREEN: NEGATIVE

## 2024-05-03 PROCEDURE — 82075 ASSAY OF BREATH ETHANOL: CPT | Mod: S$GLB,,, | Performed by: SURGERY

## 2024-05-03 PROCEDURE — 92552 PURE TONE AUDIOMETRY AIR: CPT | Mod: S$GLB,,, | Performed by: SURGERY

## 2024-05-03 PROCEDURE — 99499 UNLISTED E&M SERVICE: CPT | Mod: S$GLB,,, | Performed by: SURGERY

## 2024-05-03 PROCEDURE — 82962 GLUCOSE BLOOD TEST: CPT | Mod: S$GLB,,, | Performed by: SURGERY

## 2024-05-03 PROCEDURE — 80305 DRUG TEST PRSMV DIR OPT OBS: CPT | Mod: S$GLB,,, | Performed by: SURGERY

## 2024-05-03 PROCEDURE — 99080 SPECIAL REPORTS OR FORMS: CPT | Mod: S$GLB,,, | Performed by: SURGERY

## 2024-05-03 RX ORDER — INSULIN ASPART 100 [IU]/ML
INJECTION, SOLUTION INTRAVENOUS; SUBCUTANEOUS
COMMUNITY
Start: 2023-10-09

## 2024-05-03 RX ORDER — LISINOPRIL 20 MG/1
20 TABLET ORAL
COMMUNITY
End: 2024-05-16

## 2024-05-03 NOTE — LETTER
[address] [facility] [place] [address]  P: [phone]  F: [phone]   [email or web address]    2024    RE: Robel Desai  : 1980  Employment Physical Examination    To Whom It May Concern:    Robel Desai was seen at our facility on 2024 and it has been determined that the patient does not meet medical clearance standards for the position he applied to due to elevated blood pressure, hyperglycemia, glucosuria, and severely reduced visual acuity. As such he has been placed on a HOLD for further medical evaluation and treatment.    As his treating provider, feel free to perform any necessary evaluation to establish a diagnosis and evaluate for potential reversible causes. In order for this patient to be cleared, I will need to review his most recent  hemoglobin A1c, he must demonstrate hypertension control, and he will need to pass a visual acuity exam . This information was provided to him on his initial date of evaluation, included with this letter is the information that was requested of him.     Upon completion of your evaluation, please provide a letter/statement including relevant results of his labs or imaging, medical diagnosis, treatment plan (including prescribed medication and plans for follow up visit if applicable). Please also comment on the patient's ability to perform his job duties and include any applicable restrictions or limitations.     Please do not hesistate to contact me with any questions.     Sincerely,     Allie Hernandez MD          Date: 2024   Cannot complete all the parts of the physical exam due to HIGH BLOOD PRESSURE.   Need to be seen and evaluated by a medical provider to find out why your blood pressure is high and start on treatment.   The goal is to have a blood pressure no higher than 140/90.    No puedo completar todas las partes del examen físico debido a PRESIÓN ARTERIAL TAYE.  Necesita ser atendido y evaluado  por un proveedor médico para determinar por qué villa presión arterial es anali y comenzar el tratamiento.  El objetivo es tener louis presión arterial no superior a 140/90.    Your blood sugar is also very high.  You need to have you doctor do a lab test called Hemoglobin A1C to determine if your diabetes is controlled.    Villa nivel de azúcar en catherine también es muy alto.  Debe pedirle a villa médico que le jimmie louis prueba de laboratorio llamada Hemoglobina A1C para determinar si villa diabetes está controlada.    Your vision does not pass the requirements.   You need to see an eye doctor and get a prescription for glasses. Bring the prescription glasses with you when you return to complete the rest of the physical exam.    Tu visión no pasa los requisitos.  Debe consultar a un oftalmólogo y obtener louis receta para anteojos. Lleve consigo los anteojos recetados cuando regrese para completar el michael del examen físico.

## 2024-05-16 ENCOUNTER — HOSPITAL ENCOUNTER (EMERGENCY)
Facility: HOSPITAL | Age: 44
Discharge: HOME | End: 2024-05-16
Attending: EMERGENCY MEDICINE
Payer: MEDICAID

## 2024-05-16 VITALS
OXYGEN SATURATION: 98 % | DIASTOLIC BLOOD PRESSURE: 78 MMHG | BODY MASS INDEX: 31.92 KG/M2 | HEART RATE: 99 BPM | HEIGHT: 64 IN | WEIGHT: 187 LBS | TEMPERATURE: 99 F | SYSTOLIC BLOOD PRESSURE: 143 MMHG | RESPIRATION RATE: 18 BRPM

## 2024-05-16 DIAGNOSIS — R03.0 ELEVATED BLOOD PRESSURE READING: Primary | ICD-10-CM

## 2024-05-16 LAB
GLUCOSE SERPL-MCNC: 132 MG/DL (ref 70–110)
POCT GLUCOSE: 132 MG/DL (ref 70–110)

## 2024-05-16 PROCEDURE — 99283 EMERGENCY DEPT VISIT LOW MDM: CPT

## 2024-05-16 PROCEDURE — 82962 GLUCOSE BLOOD TEST: CPT

## 2024-05-16 RX ORDER — LISINOPRIL 20 MG/1
20 TABLET ORAL DAILY
Qty: 30 TABLET | Refills: 3 | Status: SHIPPED | OUTPATIENT
Start: 2024-05-16

## 2024-05-16 NOTE — Clinical Note
"Robel"Sarabjit Desai was seen and treated in our emergency department on 5/16/2024.  He may return to work on 05/17/2024.  Patient's blood pressure and glucose is in range. Patient has no symptoms of hypertension or hyperglycemia.      If you have any questions or concerns, please don't hesitate to call.      Holdsworth, Alayna, PA-C"

## 2024-05-16 NOTE — DISCHARGE INSTRUCTIONS
[place] por venir a nuestro Departamento de Emergencias hoy. Es importante recordar que algunos problemas o condiciones médicas son difíciles de diagnosticar y es posible que no se encuentren o aborden karina villa visita al Departamento de Emergencias. Estas condiciones a menudo comienzan con síntomas inespecíficos y solo se pueden diagnosticar en visitas de seguimiento con villa médico de atención primaria o especialista cuando los síntomas continúan o cambian. Recuerde que todas las condiciones médicas pueden cambiar y no podemos predecir cómo se sentirá mañana o al día siguiente. Regrese a la alfonzo de emergencias si tiene preguntas/inquietudes, síntomas nuevos/preocupantes, empeoramiento o falta de mejora.    Asegúrese de hacer un seguimiento con villa médico de atención primaria y revisar con ellos todos los laboratorios/imágenes/pruebas que se realizaron karina villa visita a la alfonzo de emergencias. Es muy común que identifiquemos hallazgos incidentales no emergentes que deben ser objeto de seguimiento con villa médico de atención primaria. Algunos laboratorios/imágenes/pruebas pueden estar fuera del rango normal y requieren un seguimiento que no sea de emergencia y/o más investigación/tratamiento/procedimientos/pruebas para ayudar a diagnosticar/excluir/prevenir complicaciones u otras afecciones médicas potencialmente graves. Algunas anormalidades pueden no traci sido discutidas o abordadas karina villa visita a la alfonzo de emergencias. Es posible que algunos resultados de laboratorio no regresen karina villa visita a la alfonzo de emergencias, gerardo se puede acceder a ellos descargando la aplicación gratuita [facility] o visitando [email or web address] Es importante que revise con villa médico todas las pruebas de laboratorio/imágenes/pruebas que estén fuera del rango normal.    Louis visita a la alfonzo de emergencias no reemplaza louis visita de atención primaria, y muchas pruebas de detección o de seguimiento no pueden ser  ordenadas por un médico de emergencia ni realizadas por la alfonzo de emergencias. Algunas pruebas pueden incluso requerir aprobación previa.    Si no tiene un médico de atención primaria, puede comunicarse con el que figura en la documentación del anali o también puede llamar al mostrador de citas de la [facility] al [phone] o a [address] [place] [address] al [phone] para programar louis indu. indu, o establecer atención con un médico de atención primaria o incluso un especialista y para obtener información sobre los recursos locales. Es importante para villa rehana que tenga un médico de atención primaria.    [place] todos los medicamentos según las indicaciones. Hemos hecho todo lo posible para seleccionar un medicamento para usted que tratará villa condición; sin embargo, todos los medicamentos pueden tener efectos secundarios y es imposible predecir qué medicamentos pueden causarle efectos secundarios o cuáles (si los hay) esos medicamentos le pueden rocio. Si siente que está teniendo un efecto negativo o un efecto secundario de algún medicamento, debe dejar de amena esos medicamentos de inmediato y buscar atención médica. Si siente que está teniendo louis reacción potencialmente mortal, llame al 911.    No conduzca, nade, suba a [place], se bañe, opere maquinaria pesada, aris alcohol o tome medicamentos potencialmente sedantes, firme ningún documento legal o tome decisiones importantes karina 24 horas si ha recibido algún medicamento para el dolor, sedantes o alteradores del estado de ánimo. medicamentos karina villa visita a la alfonzo de emergencias o dentro de las 24 horas de haberlos tomado si se los hicks recetado.    Puede encontrar recursos adicionales para dentistas, audífonos, equipos médicos duraderos, farmacias de bajo costo y otros recursos en [email or web address]

## 2024-05-16 NOTE — ED PROVIDER NOTES
Encounter Date: 5/16/2024    SCRIBE #1 NOTE: I, Rosa M Killian, am scribing for, and in the presence of,  Holdsworth, Alayna, PA-C. I have scribed the following portions of the note - Other sections scribed: HPI, ROS.       History     Chief Complaint   Patient presents with    Hypertension     Pt stated he job sent him here to get checked because his blood pressure is elevated.      43 y.o. male, with a PMHx of DM and HTN who presents to the ED with hypertension. Patient reports he was getting a physical assigned by his place of employment and was told his BP was too high to work, stating it was 189/96. He states he had an appointment with his PCP yesterday, and that his BP was normal then 138/93, believing that the machine that took the high BP was faulty. He states he needs a note saying his BP is within normal range. Notes he endorses 20 mg of lisinopril daily for his HTN and ran out today. No other exacerbating or alleviating factors. Denies headache, chest pain, SOB, lightheadedness, dizziness, fever, visual disturbances, urinary symptoms, pain, or other associated symptoms.       The history is provided by the patient. A  was used (Arina, #[id number]).     Review of patient's allergies indicates:  No Known Allergies  Past Medical History:   Diagnosis Date    Diabetes mellitus     Hypertension      Past Surgical History:   Procedure Laterality Date    APPENDECTOMY       Family History   Problem Relation Name Age of Onset    Diabetes Mother       Social History     Tobacco Use    Smoking status: Never    Smokeless tobacco: Never   Substance Use Topics    Alcohol use: Yes     Comment: occasionally     Drug use: No     Review of Systems   Constitutional:  Negative for chills, diaphoresis and fever.   Eyes:  Negative for visual disturbance.   Respiratory:  Negative for shortness of breath.    Cardiovascular:  Negative for chest pain.        (+) Hypertension.    Gastrointestinal:  Negative for  abdominal pain.   Genitourinary:  Negative for difficulty urinating, dysuria, frequency, hematuria and urgency.   Musculoskeletal:  Negative for back pain and neck pain.   Neurological:  Negative for dizziness, weakness, light-headedness and headaches.       Physical Exam     Initial Vitals [05/16/24 1456]   BP Pulse Resp Temp SpO2   (!) 153/86 96 18 99.3 °F (37.4 °C) 98 %      MAP       --         Physical Exam    Nursing note and vitals reviewed.  Constitutional: He appears well-developed and well-nourished. He is not diaphoretic. He is active. He does not appear ill. No distress.   HENT:   Head: Normocephalic and atraumatic.   Right Ear: External ear normal.   Left Ear: External ear normal.   Nose: Nose normal.   Eyes: Conjunctivae, EOM and lids are normal. Pupils are equal, round, and reactive to light. Right eye exhibits no discharge. Left eye exhibits no discharge. No scleral icterus.   Neck: Phonation normal. Neck supple.   Normal range of motion.   Full passive range of motion without pain.     Cardiovascular:  Normal rate and regular rhythm.           Pulmonary/Chest: Effort normal and breath sounds normal. No respiratory distress.   Abdominal: He exhibits no distension.   Musculoskeletal:         General: Normal range of motion.      Cervical back: Full passive range of motion without pain, normal range of motion and neck supple.     Neurological: He is alert and oriented to person, place, and time. He has normal strength. GCS eye subscore is 4. GCS verbal subscore is 5. GCS motor subscore is 6.   Skin: Skin is dry. Capillary refill takes less than 2 seconds.         ED Course   Procedures  Labs Reviewed   POCT GLUCOSE MONITORING CONTINUOUS          Imaging Results    None          Medications - No data to display  Medical Decision Making  43 y.o. male, with a PMHx of DM and HTN who presents to the ED with hypertension  Patient's chart and medical history reviewed.    Ddx:  HTN  HTN urgency   HTN  emergency    Patient's vitals reviewed.  Afebrile, no respiratory distress, and nontoxic-appearing in the ED.  Patient's physical exam is unremarkable.  Patient denies any complaints.  Patient states he just needs a work note. Blood pressure is 143/78, unlikely hypertension urgency or emergency. Glucose  is 132. Refill of lisinopril 20 mg daily sent to his pharmacy.  Patient to follow-up with his PCP. Patient agrees with this plan. Discussed with him strict return precautions, he verbalized understanding. Patient is stable for discharge.     Amount and/or Complexity of Data Reviewed  External Data Reviewed: labs and notes.  Labs: ordered.    Risk  Prescription drug management.            Scribe Attestation:   Scribe #1: I performed the above scribed service and the documentation accurately describes the services I performed. I attest to the accuracy of the note.                               Clinical Impression:  Final diagnoses:  [R03.0] Elevated blood pressure reading (Primary)          ED Disposition Condition    Discharge Stable          ED Prescriptions       Medication Sig Dispense Start Date End Date Auth. Provider    lisinopriL (PRINIVIL,ZESTRIL) 20 MG tablet Take 1 tablet (20 mg total) by mouth once daily. 30 tablet 5/16/2024 -- Holdsworth, Alayna, PA-C          Follow-up Information       Follow up With Specialties Details Why Contact Info    Ashish Greer [facility] [facility]  [place] [address] [phone]            I, Alayna Holdsworth,PA-C, personally performed the services described in this documentation. All medical record entries made by the scribe were at my direction and in my presence.  I have reviewed the chart and agree that the record reflects my personal performance and is accurate and complete.      Holdsworth, Alayna, PA-C  05/16/24 2401

## 2024-05-16 NOTE — ED NOTES
"Pt reports he takes his b/p however this every time he goes to get an evaluation for his job his b/p is elevated. Pt. Reports he wants a note t give the facility to let them know something is " wrong with their machine". Pt. Reports he does have a hid of HTN and is taking meds. Pt. Denies any complaints.   "

## 2024-05-22 ENCOUNTER — HOSPITAL ENCOUNTER (EMERGENCY)
Facility: HOSPITAL | Age: 44
Discharge: HOME | End: 2024-05-22
Attending: EMERGENCY MEDICINE
Payer: MEDICAID

## 2024-05-22 VITALS
WEIGHT: 185 LBS | BODY MASS INDEX: 31.76 KG/M2 | SYSTOLIC BLOOD PRESSURE: 138 MMHG | OXYGEN SATURATION: 98 % | DIASTOLIC BLOOD PRESSURE: 64 MMHG | HEART RATE: 99 BPM | TEMPERATURE: 98 F | RESPIRATION RATE: 18 BRPM

## 2024-05-22 DIAGNOSIS — Z01.30 BLOOD PRESSURE CHECK: Primary | ICD-10-CM

## 2024-05-22 DIAGNOSIS — Z86.79 HISTORY OF HYPERTENSION: ICD-10-CM

## 2024-05-22 PROBLEM — E78.5 HYPERLIPIDEMIA: Status: ACTIVE | Noted: 2021-01-13

## 2024-05-22 PROBLEM — E11.3599 PROLIFERATIVE DIABETIC RETINOPATHY: Status: ACTIVE | Noted: 2019-12-09

## 2024-05-22 PROCEDURE — 99281 EMR DPT VST MAYX REQ PHY/QHP: CPT

## 2024-05-22 NOTE — ED PROVIDER NOTES
"Encounter Date: 5/22/2024       History     Chief Complaint   Patient presents with    Hypertension     Pt reports to ED for change to blood pressure medication. Pt blood pressure fine in triage. Pt states he went for a physical and pressure was high.      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 Pt is currently taking lisinopril 20 mg by mouth daily. Pt reports this is not helping with his blood pressure and he has to take "more than 1 pill in order to pass his physical". Pt states he follows up with his primary care for this issue, and has an appointment in 1 month. Pt denies headache, pain exacerbation with movement, photophobia, or phonophobia. Pt denies nausea, vomiting, fever, or chills. Pt denies chest pain, shortness of breath, dizziness, or palpitations.     The history is provided by the patient. The history is limited by a language barrier. A  was used (Reesio [id number]).     Review of patient's allergies indicates:  No Known Allergies  Past Medical History:   Diagnosis Date    Diabetes mellitus     Hypertension      Past Surgical History:   Procedure Laterality Date    APPENDECTOMY       Family History   Problem Relation Name Age of Onset    Diabetes Mother       Social History     Tobacco Use    Smoking status: Never    Smokeless tobacco: Never   Substance Use Topics    Alcohol use: Yes     Comment: occasionally     Drug use: No     Review of Systems   Constitutional:  Negative for activity change, chills, diaphoresis, fatigue and fever.   Eyes:  Negative for photophobia and visual disturbance.   Respiratory:  Negative for cough, chest tightness, shortness of breath, wheezing and stridor.    Cardiovascular:  Negative for chest pain and palpitations.   Skin:  Negative for color change and pallor.   Neurological:  Positive for headaches. Negative for dizziness, syncope, weakness, light-headedness and " numbness.       Physical Exam     Initial Vitals [05/22/24 1533]   BP Pulse Resp Temp SpO2   138/64 99 18 98 °F (36.7 °C) 98 %      MAP       --         Physical Exam    Constitutional: He appears well-developed and well-nourished. He is not diaphoretic. No distress.   HENT:   Head: Normocephalic and atraumatic.   Eyes: Conjunctivae and EOM are normal. Pupils are equal, round, and reactive to light.   Neck:   Normal range of motion.  Cardiovascular:  Normal rate, regular rhythm and intact distal pulses.     Exam reveals no gallop and no friction rub.       No murmur heard.  Pulmonary/Chest: Breath sounds normal. No respiratory distress. He has no wheezes. He has no rhonchi. He has no rales. He exhibits no tenderness.   Musculoskeletal:      Cervical back: Normal range of motion.     Neurological: He is alert and oriented to person, place, and time. GCS score is 15. GCS eye subscore is 4. GCS verbal subscore is 5. GCS motor subscore is 6.   Skin: Skin is warm and dry. Capillary refill takes less than 2 seconds. No erythema. No pallor.   Psychiatric: He has a normal mood and affect.         ED Course   Procedures  Labs Reviewed - No data to display       Imaging Results    None          Medications - No data to display  Medical Decision Making  Patient is not hypertensive.  He has no complaints at this time.  There is no evidence of medical necessity for emergently modifying his blood pressure regimen at this time.  Advised patient to keep a log of his blood pressure at home.  Advised patient to follow up with his PCP for management of his blood pressure medications.  ED return precautions given.  Shared decision-making with the patient.                                      Clinical Impression:  Final diagnoses:  [Z01.30] Blood pressure check (Primary)  [Z86.79] History of hypertension          ED Disposition Condition    Discharge Stable          ED Prescriptions    None       Follow-up Information       Follow up  With Specialties Details Why Contact Info    Ashish Greer Count includes the [facility] -  Schedule an appointment as soon as possible for a visit in 1 week For further evaluation [address] [address]  [phone]      [facility] - Emergency Dept Emergency Medicine Go to  If symptoms worsen, As needed [address] [facility] [place] [address]  [phone]             Erlin Greer, NP  05/22/24 1015

## 2024-05-22 NOTE — DISCHARGE INSTRUCTIONS
Controle villa presión arterial en casa. Naga un seguimiento con villa médico habitual para controlar la presión.    Florence por venir a nuestro Departamento de Emergencias hoy. Es importante recordar que algunos problemas son difíciles de diagnosticar y es posible que no se detecten karina la primera visita. Asegúrese de realizar un seguimiento con villa médico de atención primaria.  Si no tiene jayda, puede comunicarse con el que figura en villa documentación de anali o también puede llamar al mostrador de citas de la [facility] al [phone] para programar louis indu con jayda.    Regrese a la alfonzo de emergencias si tiene alguna pregunta o inquietud, síntomas nuevos o preocupantes, si empeora o si no mejora. No conduzca ni tome decisiones importantes karina 24 horas si ha recibido analgésicos, sedantes o medicamentos que alteran el estado de ánimo karina villa visita a urgencias.

## 2024-09-27 ENCOUNTER — OCCUPATIONAL HEALTH (OUTPATIENT)
Dept: URGENT CARE | Facility: CLINIC | Age: 44
End: 2024-09-27

## 2024-09-27 DIAGNOSIS — Z02.1 PRE-EMPLOYMENT EXAMINATION: Primary | ICD-10-CM

## 2024-09-27 LAB — GLUCOSE SERPL-MCNC: 147 MG/DL (ref 70–110)

## 2024-09-27 RX ORDER — LISINOPRIL 20 MG/1
1 TABLET ORAL DAILY
COMMUNITY
Start: 2024-05-03

## 2024-09-27 RX ORDER — INSULIN GLARGINE 100 [IU]/ML
30 INJECTION, SOLUTION SUBCUTANEOUS DAILY
COMMUNITY

## 2024-09-27 RX ORDER — INSULIN LISPRO 100 [IU]/ML
INJECTION, SOLUTION INTRAVENOUS; SUBCUTANEOUS
COMMUNITY
Start: 2024-05-13

## 2024-10-01 ENCOUNTER — HOSPITAL ENCOUNTER (EMERGENCY)
Facility: HOSPITAL | Age: 44
Discharge: HOME | End: 2024-10-01
Attending: EMERGENCY MEDICINE
Payer: MEDICAID

## 2024-10-01 ENCOUNTER — OCCUPATIONAL HEALTH (OUTPATIENT)
Dept: URGENT CARE | Facility: CLINIC | Age: 44
End: 2024-10-01

## 2024-10-01 ENCOUNTER — TELEPHONE (OUTPATIENT)
Dept: URGENT CARE | Facility: CLINIC | Age: 44
End: 2024-10-01

## 2024-10-01 VITALS
RESPIRATION RATE: 18 BRPM | DIASTOLIC BLOOD PRESSURE: 87 MMHG | WEIGHT: 185 LBS | HEART RATE: 95 BPM | OXYGEN SATURATION: 98 % | TEMPERATURE: 98 F | BODY MASS INDEX: 31.76 KG/M2 | SYSTOLIC BLOOD PRESSURE: 140 MMHG

## 2024-10-01 DIAGNOSIS — Z01.30 BLOOD PRESSURE CHECK: Primary | ICD-10-CM

## 2024-10-01 DIAGNOSIS — Z02.1 PRE-EMPLOYMENT EXAMINATION: Primary | ICD-10-CM

## 2024-10-01 DIAGNOSIS — Z86.79 HISTORY OF HYPERTENSION: ICD-10-CM

## 2024-10-01 LAB
GLUCOSE SERPL-MCNC: 83 MG/DL (ref 70–110)
POCT GLUCOSE: 83 MG/DL (ref 70–110)

## 2024-10-01 PROCEDURE — 99282 EMERGENCY DEPT VISIT SF MDM: CPT

## 2024-10-01 PROCEDURE — 82962 GLUCOSE BLOOD TEST: CPT

## 2024-10-01 NOTE — ED PROVIDER NOTES
"Encounter Date: 10/1/2024       History     Chief Complaint   Patient presents with    Headache     Pt with hx of HTN c/o headache and states "I felt like my pressure was high" Denies any other symptoms      44 y.o. male with a PMH of HTN, DM who presents to the Emergency Department with complaints of elevated BP reading 3 days ago at a physical.  He states that he has been compliant with his BP meds and his readings have been his normal since.  He denies any symptoms at that time and at present.  He states he thinks that the physical makes him nervous causing his BP to rise.  He denies rash, fever, chest pain, SOB, numbness, weakness, tingling, abdominal pain, back pain, dysuria, hematuria, nausea, vomiting, diarrhea, or any other complaints.   He rates the pain as 0/10 and has not taken any medications for the symptoms.  No Alleviating/aggravating factors.                The history is provided by the patient. The history is limited by a language barrier. A  was used ( # [id number]).     Review of patient's allergies indicates:  No Known Allergies  Past Medical History:   Diagnosis Date    Diabetes mellitus     Hypertension      Past Surgical History:   Procedure Laterality Date    APPENDECTOMY       Family History   Problem Relation Name Age of Onset    Diabetes Mother       Social History     Tobacco Use    Smoking status: Never     Passive exposure: Never    Smokeless tobacco: Never   Substance Use Topics    Alcohol use: Yes     Comment: occasionally     Drug use: No     Review of Systems   Constitutional:  Negative for chills and fever.   HENT:  Negative for congestion, ear pain, rhinorrhea and sore throat.    Eyes:  Negative for pain, discharge and redness.   Respiratory:  Negative for cough and shortness of breath.    Cardiovascular:  Negative for chest pain.   Gastrointestinal:  Negative for abdominal pain, diarrhea, nausea and vomiting.   Genitourinary:  Negative for dysuria. "   Musculoskeletal:  Negative for back pain, neck pain and neck stiffness.   Skin:  Negative for rash.   Neurological:  Negative for dizziness, weakness, light-headedness, numbness and headaches.   Psychiatric/Behavioral:  Negative for confusion.        Physical Exam     Initial Vitals [10/01/24 0815]   BP Pulse Resp Temp SpO2   (!) 140/87 95 18 98.4 °F (36.9 °C) 98 %      MAP       --         Physical Exam    Nursing note and vitals reviewed.  Constitutional: He appears well-developed. He is cooperative.  Non-toxic appearance. He does not appear ill.   HENT:   Head: Normocephalic and atraumatic.   Right Ear: External ear normal.   Left Ear: External ear normal.   Eyes: Conjunctivae are normal.   Neck:   Normal range of motion.  Cardiovascular:  Normal rate and regular rhythm.           Pulmonary/Chest: Effort normal and breath sounds normal.   Abdominal: Abdomen is soft. There is no abdominal tenderness.   Musculoskeletal:      Cervical back: Normal range of motion.     Neurological: He is alert and oriented to person, place, and time. He has normal strength. No cranial nerve deficit or sensory deficit. Gait normal. GCS eye subscore is 4. GCS verbal subscore is 5. GCS motor subscore is 6.   Skin: Skin is warm, dry and intact. No rash noted.   Psychiatric: He has a normal mood and affect. His speech is normal and behavior is normal. Thought content normal.         ED Course   Procedures  Labs Reviewed   POCT GLUCOSE, HAND-HELD DEVICE       Result Value    POC Glucose 83     POCT GLUCOSE    POCT Glucose 83            Imaging Results    None          Medications - No data to display  Medical Decision Making  This is an urgent evaluation of a 44 y.o. male that presents to the Emergency Department for a blood pressure check.  The patient is a non-toxic, afebrile, and well appearing male. On physical exam, there is normal PE and normal neuro exam; he has a normal gait.     Vital Signs: 140/87, 98.4, 95, 18, 98%   If  available, previous records reviewed.   I ordered labs and personally reviewed them.  Labs significant for CBG 83    Given the above findings, my overall impression is BP check, H/O HTN. DDX: malignant HTN, HA, hyperglycemia, hypertensive urgency, DKA.    Discussed things to avoid that would elevated BP. He has no symptoms at present and has a normal for him BP reading today.  He has a f/u with his PCP in 3 days.  Strict return precautions given to the patient.  Questions answered via interpretor    Additional home care recommendations include Tylenol/Ibuprofen, Hydration, continue meds as prescribed. The diagnosis, treatment plan, instructions for follow-up, strict return precautions, and reevaluation with his PCP as well as ED return precautions have been discussed with the patient and he has verbalized an understanding of the information.  All questions or concerns from the patient have been addressed.             Amount and/or Complexity of Data Reviewed  Labs: ordered.                                      Clinical Impression:  Final diagnoses:  [Z01.30] Blood pressure check (Primary)  [Z86.79] History of hypertension          ED Disposition Condition    Discharge Stable          ED Prescriptions    None       Follow-up Information       Follow up With Specialties Details Why Contact Info    Ashish Greer [facility] -  Schedule an appointment as soon as possible for a visit in 2 days  [address] [address]  [phone]      [facility] Emergency Medicine Go to  If symptoms worsen [address] [facility] [place] [address] [phone]             Marilin Mccullough, DIONIP  10/01/24 0848

## 2024-10-01 NOTE — LETTER
[address] [facility] [address]  [place] [address]  P: [phone]  F: [phone]   [email or web address] Specialty Hospital - [email or web address]    10/01/2024    RE: Robel Desai  : 1980  Employment Physical Examination    Per the letter provided you on 24 by Dr. Hernández, you need to see your medical provider to provide the following information to proceed with your pre-employment evaluation:    1. Improved blood pressure <140/90  2. Letter of clearance to work without restrictions  3. Plan of care for hypertension    You are required to bring documentation from your treating medical provider on the provider's letterhead or prescription pad including ALL of the following information:    1. Diagnosis  2. Treatment Plan  3. 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  4. Bring ANY and ALL prescribed medications to our office with this documentation    Should you have any questions, please contact this office at [phone]. Requested documentation may also be faxed to the office at [phone].      Según la carta que el Dr. Hernández le proporcionó el 24, debe consultar a villa proveedor médico para proporcionar la siguiente información para proceder con villa evaluación previa al empleo:    1. Mejora de la presión arterial <140/90  2. Carta de autorización para trabajar sin restricciones  3. Plan de atención para la hipertensión    Usted debe traer documentación de villa proveedor médico tratante en papel membretado o talonario de recetas del proveedor, incluyendo TODA la siguiente información:    1. Diagnóstico  2. Plan de tratamiento  3. Situación laboral, incluidas las limitaciones para el trabajo carlos a metalúrgico (se adjunta descripción).  4. Traiga TODOS y TODOS los medicamentos recetados a nuestra oficina con esta documentación    Si tiene alguna pregunta, comuníquese con esta oficina al [phone]. La documentación solicitada también puede enviarse por fax a la oficina al  [phone].    Allie Lau MD

## 2024-10-01 NOTE — DISCHARGE INSTRUCTIONS

## 2024-10-01 NOTE — Clinical Note
"Robel"Sarabjit Desai was seen and treated in our emergency department on 10/1/2024.  He may return to work on 10/02/2024.       If you have any questions or concerns, please don't hesitate to call.      Marilin Mccullough, FNP"

## 2025-01-28 ENCOUNTER — ANESTHESIA EVENT (OUTPATIENT)
Dept: SURGERY | Facility: HOSPITAL | Age: 45
End: 2025-01-28
Payer: MEDICAID

## 2025-01-28 ENCOUNTER — HOSPITAL ENCOUNTER (INPATIENT)
Facility: HOSPITAL | Age: 45
LOS: 6 days | Discharge: HOME | DRG: 255 | End: 2025-02-03
Attending: EMERGENCY MEDICINE | Admitting: EMERGENCY MEDICINE
Payer: MEDICAID

## 2025-01-28 ENCOUNTER — ANESTHESIA (OUTPATIENT)
Dept: SURGERY | Facility: HOSPITAL | Age: 45
End: 2025-01-28
Payer: MEDICAID

## 2025-01-28 DIAGNOSIS — R07.9 CHEST PAIN: ICD-10-CM

## 2025-01-28 DIAGNOSIS — M86.9 OSTEOMYELITIS OF FOURTH TOE OF LEFT FOOT: ICD-10-CM

## 2025-01-28 DIAGNOSIS — Z79.4 TYPE 2 DIABETES MELLITUS WITH HYPERGLYCEMIA, WITH LONG-TERM CURRENT USE OF INSULIN: ICD-10-CM

## 2025-01-28 DIAGNOSIS — N18.2 ACUTE KIDNEY INJURY SUPERIMPOSED ON STAGE 2 CHRONIC KIDNEY DISEASE: ICD-10-CM

## 2025-01-28 DIAGNOSIS — N17.9 ACUTE KIDNEY INJURY SUPERIMPOSED ON STAGE 2 CHRONIC KIDNEY DISEASE: ICD-10-CM

## 2025-01-28 DIAGNOSIS — N18.9 ACUTE RENAL FAILURE SUPERIMPOSED ON CHRONIC KIDNEY DISEASE, UNSPECIFIED ACUTE RENAL FAILURE TYPE, UNSPECIFIED CKD STAGE: ICD-10-CM

## 2025-01-28 DIAGNOSIS — E11.628 DIABETIC FOOT INFECTION: ICD-10-CM

## 2025-01-28 DIAGNOSIS — N17.9 ACUTE RENAL FAILURE SUPERIMPOSED ON CHRONIC KIDNEY DISEASE, UNSPECIFIED ACUTE RENAL FAILURE TYPE, UNSPECIFIED CKD STAGE: ICD-10-CM

## 2025-01-28 DIAGNOSIS — L02.91 PURULENT ABSCESS: ICD-10-CM

## 2025-01-28 DIAGNOSIS — A48.0 GAS GANGRENE OF FOOT: ICD-10-CM

## 2025-01-28 DIAGNOSIS — M86.9 OSTEOMYELITIS: Primary | ICD-10-CM

## 2025-01-28 DIAGNOSIS — E11.65 TYPE 2 DIABETES MELLITUS WITH HYPERGLYCEMIA, WITH LONG-TERM CURRENT USE OF INSULIN: ICD-10-CM

## 2025-01-28 DIAGNOSIS — Z01.810 PREOP CARDIOVASCULAR EXAM: ICD-10-CM

## 2025-01-28 DIAGNOSIS — L08.9 DIABETIC FOOT INFECTION: ICD-10-CM

## 2025-01-28 LAB
ALBUMIN SERPL BCP-MCNC: 3.4 G/DL (ref 3.5–5.2)
ALLENS TEST: NORMAL
ALP SERPL-CCNC: 120 U/L (ref 40–150)
ALT SERPL W/O P-5'-P-CCNC: 34 U/L (ref 10–44)
ANION GAP SERPL CALC-SCNC: 16 MMOL/L (ref 8–16)
AST SERPL-CCNC: 23 U/L (ref 10–40)
B-OH-BUTYR BLD STRIP-SCNC: 0.2 MMOL/L (ref 0–0.5)
BACTERIA #/AREA URNS HPF: NORMAL /HPF
BASOPHILS # BLD AUTO: 0.04 K/UL (ref 0–0.2)
BASOPHILS NFR BLD: 0.3 % (ref 0–1.9)
BILIRUB SERPL-MCNC: 0.3 MG/DL (ref 0.1–1)
BILIRUB UR QL STRIP: NEGATIVE
BUN SERPL-MCNC: 46 MG/DL (ref 6–20)
CALCIUM SERPL-MCNC: 10 MG/DL (ref 8.7–10.5)
CHLORIDE SERPL-SCNC: 103 MMOL/L (ref 95–110)
CLARITY UR: CLEAR
CO2 SERPL-SCNC: 20 MMOL/L (ref 23–29)
COLOR UR: YELLOW
CREAT SERPL-MCNC: 2.9 MG/DL (ref 0.5–1.4)
CRP SERPL-MCNC: 46.3 MG/L (ref 0–8.2)
DELSYS: NORMAL
DIFFERENTIAL METHOD BLD: ABNORMAL
EOSINOPHIL # BLD AUTO: 0.2 K/UL (ref 0–0.5)
EOSINOPHIL NFR BLD: 1.3 % (ref 0–8)
ERYTHROCYTE [DISTWIDTH] IN BLOOD BY AUTOMATED COUNT: 12.3 % (ref 11.5–14.5)
ERYTHROCYTE [SEDIMENTATION RATE] IN BLOOD BY PHOTOMETRIC METHOD: >120 MM/HR (ref 0–23)
EST. GFR  (NO RACE VARIABLE): 27 ML/MIN/1.73 M^2
ESTIMATED AVG GLUCOSE: 140 MG/DL (ref 68–131)
GLUCOSE SERPL-MCNC: 224 MG/DL (ref 70–110)
GLUCOSE UR QL STRIP: ABNORMAL
GRAM STN SPEC: NORMAL
GRAM STN SPEC: NORMAL
HBA1C MFR BLD: 6.5 % (ref 4–5.6)
HCT VFR BLD AUTO: 38.1 % (ref 40–54)
HGB BLD-MCNC: 12.5 G/DL (ref 14–18)
HGB UR QL STRIP: NEGATIVE
HIV1+2 IGG SERPL QL IA.RAPID: NORMAL
HYALINE CASTS #/AREA URNS LPF: 0 /LPF
IMM GRANULOCYTES # BLD AUTO: 0.05 K/UL (ref 0–0.04)
IMM GRANULOCYTES NFR BLD AUTO: 0.4 % (ref 0–0.5)
KETONES UR QL STRIP: NEGATIVE
LACTATE SERPL-SCNC: 1 MMOL/L (ref 0.5–2.2)
LDH SERPL L TO P-CCNC: 1.44 MMOL/L (ref 0.5–2.2)
LEUKOCYTE ESTERASE UR QL STRIP: NEGATIVE
LYMPHOCYTES # BLD AUTO: 2.5 K/UL (ref 1–4.8)
LYMPHOCYTES NFR BLD: 18.1 % (ref 18–48)
MAGNESIUM SERPL-MCNC: 2.3 MG/DL (ref 1.6–2.6)
MCH RBC QN AUTO: 28.6 PG (ref 27–31)
MCHC RBC AUTO-ENTMCNC: 32.8 G/DL (ref 32–36)
MCV RBC AUTO: 87 FL (ref 82–98)
MICROSCOPIC COMMENT: NORMAL
MONOCYTES # BLD AUTO: 1.1 K/UL (ref 0.3–1)
MONOCYTES NFR BLD: 8.2 % (ref 4–15)
NEUTROPHILS # BLD AUTO: 9.8 K/UL (ref 1.8–7.7)
NEUTROPHILS NFR BLD: 71.7 % (ref 38–73)
NITRITE UR QL STRIP: NEGATIVE
NRBC BLD-RTO: 0 /100 WBC
OHS QRS DURATION: 78 MS
OHS QTC CALCULATION: 406 MS
PH UR STRIP: 7 [PH] (ref 5–8)
PLATELET # BLD AUTO: 452 K/UL (ref 150–450)
PMV BLD AUTO: 9.3 FL (ref 9.2–12.9)
POCT GLUCOSE: 104 MG/DL (ref 70–110)
POCT GLUCOSE: 197 MG/DL (ref 70–110)
POCT GLUCOSE: 213 MG/DL (ref 70–110)
POCT GLUCOSE: 86 MG/DL (ref 70–110)
POTASSIUM SERPL-SCNC: 4.4 MMOL/L (ref 3.5–5.1)
PROCALCITONIN SERPL IA-MCNC: 0.15 NG/ML
PROT SERPL-MCNC: 8.9 G/DL (ref 6–8.4)
PROT UR QL STRIP: ABNORMAL
RBC # BLD AUTO: 4.37 M/UL (ref 4.6–6.2)
RBC #/AREA URNS HPF: 0 /HPF (ref 0–4)
SAMPLE: NORMAL
SITE: NORMAL
SODIUM SERPL-SCNC: 139 MMOL/L (ref 136–145)
SP GR UR STRIP: 1.02 (ref 1–1.03)
URN SPEC COLLECT METH UR: ABNORMAL
UROBILINOGEN UR STRIP-ACNC: NEGATIVE EU/DL
VANCOMYCIN SERPL-MCNC: 16.8 UG/ML
WBC # BLD AUTO: 13.6 K/UL (ref 3.9–12.7)
WBC #/AREA URNS HPF: 0 /HPF (ref 0–5)
YEAST URNS QL MICRO: NORMAL

## 2025-01-28 PROCEDURE — 36000707: Performed by: PODIATRIST

## 2025-01-28 PROCEDURE — 63600175 PHARM REV CODE 636 W HCPCS: Mod: JZ,TB | Performed by: STUDENT IN AN ORGANIZED HEALTH CARE EDUCATION/TRAINING PROGRAM

## 2025-01-28 PROCEDURE — 83036 HEMOGLOBIN GLYCOSYLATED A1C: CPT | Performed by: EMERGENCY MEDICINE

## 2025-01-28 PROCEDURE — 0Y6W0Z1 DETACHMENT AT LEFT 4TH TOE, HIGH, OPEN APPROACH: ICD-10-PCS | Performed by: PODIATRIST

## 2025-01-28 PROCEDURE — 93010 ELECTROCARDIOGRAM REPORT: CPT | Mod: ,,, | Performed by: INTERNAL MEDICINE

## 2025-01-28 PROCEDURE — 87077 CULTURE AEROBIC IDENTIFY: CPT | Performed by: PODIATRIST

## 2025-01-28 PROCEDURE — 86140 C-REACTIVE PROTEIN: CPT | Performed by: EMERGENCY MEDICINE

## 2025-01-28 PROCEDURE — 80053 COMPREHEN METABOLIC PANEL: CPT | Performed by: EMERGENCY MEDICINE

## 2025-01-28 PROCEDURE — 88307 TISSUE EXAM BY PATHOLOGIST: CPT | Mod: 26,,, | Performed by: PATHOLOGY

## 2025-01-28 PROCEDURE — 63600175 PHARM REV CODE 636 W HCPCS: Performed by: NURSE ANESTHETIST, CERTIFIED REGISTERED

## 2025-01-28 PROCEDURE — 94799 UNLISTED PULMONARY SVC/PX: CPT

## 2025-01-28 PROCEDURE — 88305 TISSUE EXAM BY PATHOLOGIST: CPT | Mod: 26,,, | Performed by: PATHOLOGY

## 2025-01-28 PROCEDURE — 87206 SMEAR FLUORESCENT/ACID STAI: CPT | Performed by: PODIATRIST

## 2025-01-28 PROCEDURE — 99900035 HC TECH TIME PER 15 MIN (STAT)

## 2025-01-28 PROCEDURE — 71000039 HC RECOVERY, EACH ADD'L HOUR: Performed by: PODIATRIST

## 2025-01-28 PROCEDURE — 88311 DECALCIFY TISSUE: CPT | Mod: 26,,, | Performed by: PATHOLOGY

## 2025-01-28 PROCEDURE — 87205 SMEAR GRAM STAIN: CPT | Mod: 59 | Performed by: PODIATRIST

## 2025-01-28 PROCEDURE — 99285 EMERGENCY DEPT VISIT HI MDM: CPT | Mod: 25

## 2025-01-28 PROCEDURE — 96365 THER/PROPH/DIAG IV INF INIT: CPT

## 2025-01-28 PROCEDURE — 25000003 PHARM REV CODE 250: Performed by: PODIATRIST

## 2025-01-28 PROCEDURE — 63600175 PHARM REV CODE 636 W HCPCS: Performed by: PODIATRIST

## 2025-01-28 PROCEDURE — 87075 CULTR BACTERIA EXCEPT BLOOD: CPT | Performed by: STUDENT IN AN ORGANIZED HEALTH CARE EDUCATION/TRAINING PROGRAM

## 2025-01-28 PROCEDURE — 84145 PROCALCITONIN (PCT): CPT | Performed by: EMERGENCY MEDICINE

## 2025-01-28 PROCEDURE — 83605 ASSAY OF LACTIC ACID: CPT

## 2025-01-28 PROCEDURE — 25000003 PHARM REV CODE 250: Performed by: NURSE ANESTHETIST, CERTIFIED REGISTERED

## 2025-01-28 PROCEDURE — 87075 CULTR BACTERIA EXCEPT BLOOD: CPT | Mod: 59 | Performed by: PODIATRIST

## 2025-01-28 PROCEDURE — 88311 DECALCIFY TISSUE: CPT | Performed by: PATHOLOGY

## 2025-01-28 PROCEDURE — 37000009 HC ANESTHESIA EA ADD 15 MINS: Performed by: PODIATRIST

## 2025-01-28 PROCEDURE — 86703 HIV-1/HIV-2 1 RESULT ANTBDY: CPT | Performed by: EMERGENCY MEDICINE

## 2025-01-28 PROCEDURE — 99223 1ST HOSP IP/OBS HIGH 75: CPT | Mod: 25,,, | Performed by: PODIATRIST

## 2025-01-28 PROCEDURE — 87185 SC STD ENZYME DETCJ PER NZM: CPT | Performed by: PODIATRIST

## 2025-01-28 PROCEDURE — 87102 FUNGUS ISOLATION CULTURE: CPT | Mod: 59 | Performed by: PODIATRIST

## 2025-01-28 PROCEDURE — 87040 BLOOD CULTURE FOR BACTERIA: CPT | Performed by: EMERGENCY MEDICINE

## 2025-01-28 PROCEDURE — 25000003 PHARM REV CODE 250: Performed by: EMERGENCY MEDICINE

## 2025-01-28 PROCEDURE — 87070 CULTURE OTHR SPECIMN AEROBIC: CPT | Mod: 59 | Performed by: PODIATRIST

## 2025-01-28 PROCEDURE — 82962 GLUCOSE BLOOD TEST: CPT

## 2025-01-28 PROCEDURE — 83605 ASSAY OF LACTIC ACID: CPT | Performed by: EMERGENCY MEDICINE

## 2025-01-28 PROCEDURE — 63600175 PHARM REV CODE 636 W HCPCS: Mod: JZ,TB | Performed by: INTERNAL MEDICINE

## 2025-01-28 PROCEDURE — 63600175 PHARM REV CODE 636 W HCPCS: Performed by: EMERGENCY MEDICINE

## 2025-01-28 PROCEDURE — 28820 AMPUTATION OF TOE: CPT | Mod: T3,,, | Performed by: PODIATRIST

## 2025-01-28 PROCEDURE — 88307 TISSUE EXAM BY PATHOLOGIST: CPT | Performed by: PATHOLOGY

## 2025-01-28 PROCEDURE — D9220A PRA ANESTHESIA: Mod: CRNA,,, | Performed by: NURSE ANESTHETIST, CERTIFIED REGISTERED

## 2025-01-28 PROCEDURE — 21400001 HC TELEMETRY ROOM

## 2025-01-28 PROCEDURE — 81000 URINALYSIS NONAUTO W/SCOPE: CPT | Performed by: EMERGENCY MEDICINE

## 2025-01-28 PROCEDURE — 87205 SMEAR GRAM STAIN: CPT | Performed by: STUDENT IN AN ORGANIZED HEALTH CARE EDUCATION/TRAINING PROGRAM

## 2025-01-28 PROCEDURE — 63600175 PHARM REV CODE 636 W HCPCS: Mod: TB | Performed by: ANESTHESIOLOGY

## 2025-01-28 PROCEDURE — 85652 RBC SED RATE AUTOMATED: CPT | Performed by: EMERGENCY MEDICINE

## 2025-01-28 PROCEDURE — 85025 COMPLETE CBC W/AUTO DIFF WBC: CPT | Performed by: EMERGENCY MEDICINE

## 2025-01-28 PROCEDURE — 96375 TX/PRO/DX INJ NEW DRUG ADDON: CPT

## 2025-01-28 PROCEDURE — 25000003 PHARM REV CODE 250: Performed by: STUDENT IN AN ORGANIZED HEALTH CARE EDUCATION/TRAINING PROGRAM

## 2025-01-28 PROCEDURE — 93005 ELECTROCARDIOGRAM TRACING: CPT

## 2025-01-28 PROCEDURE — 88305 TISSUE EXAM BY PATHOLOGIST: CPT | Performed by: PATHOLOGY

## 2025-01-28 PROCEDURE — 71000033 HC RECOVERY, INTIAL HOUR: Performed by: PODIATRIST

## 2025-01-28 PROCEDURE — 37000008 HC ANESTHESIA 1ST 15 MINUTES: Performed by: PODIATRIST

## 2025-01-28 PROCEDURE — 80202 ASSAY OF VANCOMYCIN: CPT | Performed by: STUDENT IN AN ORGANIZED HEALTH CARE EDUCATION/TRAINING PROGRAM

## 2025-01-28 PROCEDURE — 87070 CULTURE OTHR SPECIMN AEROBIC: CPT | Performed by: STUDENT IN AN ORGANIZED HEALTH CARE EDUCATION/TRAINING PROGRAM

## 2025-01-28 PROCEDURE — 36000706: Performed by: PODIATRIST

## 2025-01-28 PROCEDURE — 63600175 PHARM REV CODE 636 W HCPCS: Mod: JZ,TB | Performed by: ANESTHESIOLOGY

## 2025-01-28 PROCEDURE — 63600175 PHARM REV CODE 636 W HCPCS: Performed by: STUDENT IN AN ORGANIZED HEALTH CARE EDUCATION/TRAINING PROGRAM

## 2025-01-28 PROCEDURE — 83735 ASSAY OF MAGNESIUM: CPT | Performed by: EMERGENCY MEDICINE

## 2025-01-28 PROCEDURE — D9220A PRA ANESTHESIA: Mod: ANES,,, | Performed by: ANESTHESIOLOGY

## 2025-01-28 PROCEDURE — 82010 KETONE BODYS QUAN: CPT | Performed by: EMERGENCY MEDICINE

## 2025-01-28 PROCEDURE — 87116 MYCOBACTERIA CULTURE: CPT | Performed by: PODIATRIST

## 2025-01-28 RX ORDER — IPRATROPIUM BROMIDE AND ALBUTEROL SULFATE 2.5; .5 MG/3ML; MG/3ML
3 SOLUTION RESPIRATORY (INHALATION) EVERY 6 HOURS PRN
Status: DISCONTINUED | OUTPATIENT
Start: 2025-01-28 | End: 2025-02-03 | Stop reason: HOSPADM

## 2025-01-28 RX ORDER — IBUPROFEN 200 MG
16 TABLET ORAL
Status: DISCONTINUED | OUTPATIENT
Start: 2025-01-28 | End: 2025-02-01 | Stop reason: SDUPTHER

## 2025-01-28 RX ORDER — GLUCAGON 1 MG
1 KIT INJECTION
Status: DISCONTINUED | OUTPATIENT
Start: 2025-01-28 | End: 2025-02-01 | Stop reason: SDUPTHER

## 2025-01-28 RX ORDER — MIDAZOLAM HYDROCHLORIDE 1 MG/ML
INJECTION INTRAMUSCULAR; INTRAVENOUS
Status: DISCONTINUED | OUTPATIENT
Start: 2025-01-28 | End: 2025-01-28

## 2025-01-28 RX ORDER — SODIUM CHLORIDE 9 MG/ML
INJECTION, SOLUTION INTRAVENOUS CONTINUOUS
Status: DISCONTINUED | OUTPATIENT
Start: 2025-01-28 | End: 2025-01-29

## 2025-01-28 RX ORDER — IBUPROFEN 200 MG
24 TABLET ORAL
Status: DISCONTINUED | OUTPATIENT
Start: 2025-01-28 | End: 2025-02-01 | Stop reason: SDUPTHER

## 2025-01-28 RX ORDER — POLYETHYLENE GLYCOL 3350 17 G/17G
17 POWDER, FOR SOLUTION ORAL 2 TIMES DAILY PRN
Status: DISCONTINUED | OUTPATIENT
Start: 2025-01-28 | End: 2025-02-03 | Stop reason: HOSPADM

## 2025-01-28 RX ORDER — MEPERIDINE HYDROCHLORIDE 25 MG/ML
12.5 INJECTION INTRAMUSCULAR; INTRAVENOUS; SUBCUTANEOUS EVERY 10 MIN PRN
Status: DISCONTINUED | OUTPATIENT
Start: 2025-01-28 | End: 2025-01-28 | Stop reason: HOSPADM

## 2025-01-28 RX ORDER — HYDROMORPHONE HYDROCHLORIDE 2 MG/ML
0.2 INJECTION, SOLUTION INTRAMUSCULAR; INTRAVENOUS; SUBCUTANEOUS EVERY 5 MIN PRN
Status: DISCONTINUED | OUTPATIENT
Start: 2025-01-28 | End: 2025-01-28 | Stop reason: HOSPADM

## 2025-01-28 RX ORDER — EMPAGLIFLOZIN 10 MG/1
10 TABLET, FILM COATED ORAL DAILY
COMMUNITY

## 2025-01-28 RX ORDER — HYDROMORPHONE HYDROCHLORIDE 1 MG/ML
1 INJECTION, SOLUTION INTRAMUSCULAR; INTRAVENOUS; SUBCUTANEOUS ONCE
Status: COMPLETED | OUTPATIENT
Start: 2025-01-28 | End: 2025-01-28

## 2025-01-28 RX ORDER — AMOXICILLIN 250 MG
1 CAPSULE ORAL DAILY PRN
Status: DISCONTINUED | OUTPATIENT
Start: 2025-01-28 | End: 2025-02-03 | Stop reason: HOSPADM

## 2025-01-28 RX ORDER — LIDOCAINE HYDROCHLORIDE 10 MG/ML
INJECTION, SOLUTION INFILTRATION; PERINEURAL
Status: DISCONTINUED | OUTPATIENT
Start: 2025-01-28 | End: 2025-01-28 | Stop reason: HOSPADM

## 2025-01-28 RX ORDER — PROCHLORPERAZINE EDISYLATE 5 MG/ML
5 INJECTION INTRAMUSCULAR; INTRAVENOUS EVERY 6 HOURS PRN
Status: DISCONTINUED | OUTPATIENT
Start: 2025-01-28 | End: 2025-02-03 | Stop reason: HOSPADM

## 2025-01-28 RX ORDER — KETAMINE HYDROCHLORIDE 100 MG/ML
INJECTION, SOLUTION INTRAMUSCULAR; INTRAVENOUS
Status: DISCONTINUED | OUTPATIENT
Start: 2025-01-28 | End: 2025-01-28

## 2025-01-28 RX ORDER — SODIUM,POTASSIUM PHOSPHATES 280-250MG
2 POWDER IN PACKET (EA) ORAL
Status: DISCONTINUED | OUTPATIENT
Start: 2025-01-28 | End: 2025-02-03 | Stop reason: HOSPADM

## 2025-01-28 RX ORDER — INSULIN ASPART 100 [IU]/ML
0-5 INJECTION, SOLUTION INTRAVENOUS; SUBCUTANEOUS
Status: DISCONTINUED | OUTPATIENT
Start: 2025-01-28 | End: 2025-01-28

## 2025-01-28 RX ORDER — ONDANSETRON 8 MG/1
8 TABLET, ORALLY DISINTEGRATING ORAL EVERY 8 HOURS PRN
Status: DISCONTINUED | OUTPATIENT
Start: 2025-01-28 | End: 2025-02-03 | Stop reason: HOSPADM

## 2025-01-28 RX ORDER — PROPOFOL 10 MG/ML
VIAL (ML) INTRAVENOUS
Status: DISCONTINUED | OUTPATIENT
Start: 2025-01-28 | End: 2025-01-28

## 2025-01-28 RX ORDER — INSULIN ASPART 100 [IU]/ML
0-5 INJECTION, SOLUTION INTRAVENOUS; SUBCUTANEOUS EVERY 6 HOURS PRN
Status: DISCONTINUED | OUTPATIENT
Start: 2025-01-28 | End: 2025-02-01

## 2025-01-28 RX ORDER — LANOLIN ALCOHOL/MO/W.PET/CERES
800 CREAM (GRAM) TOPICAL
Status: DISCONTINUED | OUTPATIENT
Start: 2025-01-28 | End: 2025-02-03 | Stop reason: HOSPADM

## 2025-01-28 RX ORDER — NALOXONE HCL 0.4 MG/ML
0.02 VIAL (ML) INJECTION
Status: DISCONTINUED | OUTPATIENT
Start: 2025-01-28 | End: 2025-02-03 | Stop reason: HOSPADM

## 2025-01-28 RX ORDER — HYDROMORPHONE HYDROCHLORIDE 2 MG/ML
1 INJECTION, SOLUTION INTRAMUSCULAR; INTRAVENOUS; SUBCUTANEOUS EVERY 4 HOURS PRN
Status: DISCONTINUED | OUTPATIENT
Start: 2025-01-28 | End: 2025-01-28

## 2025-01-28 RX ORDER — HYDROMORPHONE HYDROCHLORIDE 2 MG/ML
0.2 INJECTION, SOLUTION INTRAMUSCULAR; INTRAVENOUS; SUBCUTANEOUS EVERY 5 MIN PRN
Status: DISCONTINUED | OUTPATIENT
Start: 2025-01-28 | End: 2025-01-28

## 2025-01-28 RX ORDER — ACETAMINOPHEN 325 MG/1
650 TABLET ORAL EVERY 4 HOURS PRN
Status: DISCONTINUED | OUTPATIENT
Start: 2025-01-28 | End: 2025-02-03 | Stop reason: HOSPADM

## 2025-01-28 RX ORDER — SIMETHICONE 80 MG
1 TABLET,CHEWABLE ORAL 4 TIMES DAILY PRN
Status: DISCONTINUED | OUTPATIENT
Start: 2025-01-28 | End: 2025-02-03 | Stop reason: HOSPADM

## 2025-01-28 RX ORDER — HYDROMORPHONE HYDROCHLORIDE 1 MG/ML
1 INJECTION, SOLUTION INTRAMUSCULAR; INTRAVENOUS; SUBCUTANEOUS EVERY 4 HOURS PRN
Status: DISCONTINUED | OUTPATIENT
Start: 2025-01-28 | End: 2025-02-03 | Stop reason: HOSPADM

## 2025-01-28 RX ORDER — BUPIVACAINE HYDROCHLORIDE 5 MG/ML
INJECTION, SOLUTION PERINEURAL
Status: DISCONTINUED | OUTPATIENT
Start: 2025-01-28 | End: 2025-01-28 | Stop reason: HOSPADM

## 2025-01-28 RX ORDER — CEFEPIME HYDROCHLORIDE 2 G/1
2 INJECTION, POWDER, FOR SOLUTION INTRAVENOUS
Status: DISCONTINUED | OUTPATIENT
Start: 2025-01-28 | End: 2025-01-31

## 2025-01-28 RX ORDER — ASPIRIN 81 MG/1
81 TABLET ORAL DAILY
Status: DISCONTINUED | OUTPATIENT
Start: 2025-01-29 | End: 2025-02-03 | Stop reason: HOSPADM

## 2025-01-28 RX ORDER — FENTANYL CITRATE 50 UG/ML
INJECTION, SOLUTION INTRAMUSCULAR; INTRAVENOUS
Status: DISCONTINUED | OUTPATIENT
Start: 2025-01-28 | End: 2025-01-28

## 2025-01-28 RX ORDER — AMLODIPINE BESYLATE 2.5 MG/1
2.5 TABLET ORAL DAILY
Status: DISCONTINUED | OUTPATIENT
Start: 2025-01-29 | End: 2025-01-28

## 2025-01-28 RX ORDER — GLUCAGON 1 MG
1 KIT INJECTION
Status: DISCONTINUED | OUTPATIENT
Start: 2025-01-28 | End: 2025-02-01

## 2025-01-28 RX ORDER — LIDOCAINE HYDROCHLORIDE 20 MG/ML
INJECTION INTRAVENOUS
Status: DISCONTINUED | OUTPATIENT
Start: 2025-01-28 | End: 2025-01-28

## 2025-01-28 RX ORDER — INSULIN GLARGINE 100 [IU]/ML
30 INJECTION, SOLUTION SUBCUTANEOUS 2 TIMES DAILY
COMMUNITY
Start: 2024-11-11

## 2025-01-28 RX ORDER — TALC
6 POWDER (GRAM) TOPICAL NIGHTLY
Status: DISCONTINUED | OUTPATIENT
Start: 2025-01-28 | End: 2025-02-03 | Stop reason: HOSPADM

## 2025-01-28 RX ORDER — DIPHENHYDRAMINE HYDROCHLORIDE 50 MG/ML
25 INJECTION INTRAMUSCULAR; INTRAVENOUS EVERY 6 HOURS PRN
Status: DISCONTINUED | OUTPATIENT
Start: 2025-01-28 | End: 2025-01-28

## 2025-01-28 RX ADMIN — FENTANYL CITRATE 50 MCG: 50 INJECTION, SOLUTION INTRAMUSCULAR; INTRAVENOUS at 04:01

## 2025-01-28 RX ADMIN — HYDROMORPHONE HYDROCHLORIDE 1 MG: 1 INJECTION, SOLUTION INTRAMUSCULAR; INTRAVENOUS; SUBCUTANEOUS at 07:01

## 2025-01-28 RX ADMIN — SODIUM CHLORIDE 1000 ML: 9 INJECTION, SOLUTION INTRAVENOUS at 06:01

## 2025-01-28 RX ADMIN — PROPOFOL 20 MG: 10 INJECTION, EMULSION INTRAVENOUS at 04:01

## 2025-01-28 RX ADMIN — HYDROMORPHONE HYDROCHLORIDE 0.2 MG: 2 INJECTION INTRAMUSCULAR; INTRAVENOUS; SUBCUTANEOUS at 06:01

## 2025-01-28 RX ADMIN — PROPOFOL 30 MG: 10 INJECTION, EMULSION INTRAVENOUS at 04:01

## 2025-01-28 RX ADMIN — HYDROMORPHONE HYDROCHLORIDE 1 MG: 1 INJECTION, SOLUTION INTRAMUSCULAR; INTRAVENOUS; SUBCUTANEOUS at 09:01

## 2025-01-28 RX ADMIN — Medication 6 MG: at 08:01

## 2025-01-28 RX ADMIN — CEFEPIME 2 G: 2 INJECTION, POWDER, FOR SOLUTION INTRAVENOUS at 05:01

## 2025-01-28 RX ADMIN — MIDAZOLAM HYDROCHLORIDE 2 MG: 1 INJECTION INTRAMUSCULAR; INTRAVENOUS at 04:01

## 2025-01-28 RX ADMIN — KETAMINE HYDROCHLORIDE 20 MG: 100 INJECTION, SOLUTION, CONCENTRATE INTRAMUSCULAR; INTRAVENOUS at 04:01

## 2025-01-28 RX ADMIN — VANCOMYCIN HYDROCHLORIDE 1750 MG: 500 INJECTION, POWDER, LYOPHILIZED, FOR SOLUTION INTRAVENOUS at 06:01

## 2025-01-28 RX ADMIN — SODIUM CHLORIDE: 9 INJECTION, SOLUTION INTRAVENOUS at 07:01

## 2025-01-28 RX ADMIN — PROPOFOL 20 MG: 10 INJECTION, EMULSION INTRAVENOUS at 05:01

## 2025-01-28 RX ADMIN — LIDOCAINE HYDROCHLORIDE 100 MG: 20 INJECTION, SOLUTION INTRAVENOUS at 04:01

## 2025-01-28 RX ADMIN — PIPERACILLIN SODIUM AND TAZOBACTAM SODIUM 4.5 G: 4; .5 INJECTION, POWDER, FOR SOLUTION INTRAVENOUS at 05:01

## 2025-01-28 RX ADMIN — SODIUM CHLORIDE: 9 INJECTION, SOLUTION INTRAVENOUS at 10:01

## 2025-01-28 RX ADMIN — SODIUM CHLORIDE, SODIUM LACTATE, POTASSIUM CHLORIDE, AND CALCIUM CHLORIDE: .6; .31; .03; .02 INJECTION, SOLUTION INTRAVENOUS at 04:01

## 2025-01-28 NOTE — PLAN OF CARE
Patient to Return to floor with orders to ice and elevate surgical limb for the next 24 hours.     He will be nonweightbearing to the left foot for the 1st 24 hours and then he will be able to be heel weight-bearing to the left foot in Cam boot with walker or crutch assistance as needed.  Postoperative instructions to rest, elevate, ice the left foot.  Dressing is to remain clean, dry, intact until rounds tomorrow.

## 2025-01-28 NOTE — OP NOTE
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 The risks, benefits, complications, treatment options, and expected outcomes were discussed with the patient. The risks and potential complications of their problem and purposed treatment include but are not limited to infection, nerve injury, vascular injury, pain, potential skin necrosis, deep vein thrombosis, possible pulmonary embolus, complications of the anesthetics and need for further amputation.  The patient concurred with the proposed plan, giving informed consent.  The site of surgery properly noted/marked. The patient was taken to Operating Room # 5 identified as Robel Desai and the procedure verified as 4th digit amputation of the  left foot. A Time Out was held and the above information confirmed.    The patient was brought to the operating room, placed on the operating table in a supine position. Following the successful induction of anesthesia, 10 ccs of a 1:1 mixture of 1 % Lidocaine plain and 0.5% Bupivicaine plain was injected as a digital block. The foot was then scrubbed, prepped, and draped in the usual aseptic  manner.  The stockinette was then reflected and attention was directed to the 4th digit  of the left foot where there was a full-thickness ulceration to the medial and lateral aspects of the head of the proximal phalanx with deep probe to bone and active purulent drainage.    A #15 blade was used to make an incision down the bone in a linear fashion over the 4th metatarsal shaft. The incision was carried lateromedially and plantarly encompassing the toe leaving as much skin intact as possible. The 4th digit was disarticulated from the MTPJ. Upon inspection of the head of the proximal phalanx of the 4th digit discoloration, and softness was noted.  A portion of the head of the proximal phalanx was sent for microbiology and the rest of the 4th digit was sent to pathology.  Next the head of the 4th metatarsal was inspected and appeared healthy in appearance.  And approximately 5 mm margin was resected utilizing a sagittal saw  and sent as proximal margin. Next, the tendons were identified and retracted as far as possible distally and transected. The wound bed was debulked of any nonviable tissue. No purulent drainage or abscess remained. The proximal margin of the surgical site tissue was inspected and appeared viable and healthy. There was no malodor.  Next,  betasept was instilled into the wound.    Wound will be left opened to drain. The patient tolerated the above anesthesia and surgery without apparent complications. A standard postoperative dressing was applied consisting of betadine soaked adaptic, gauze, 4x4s, Kerlix,  Cast padding and coban. The patient was transported via cart to Postanesthesia Care Unit with vital signs able and vascular status intact to foot. He will be admitted to the floor where we will continue to follow . Plan is to continue the antibiotics until further ID recommendations.    He will be nonweightbearing to the left foot for the 1st 24 hours and then he will be able to be heel  weight-bearing to the left foot in Cam boot with walker or crutch assistance as needed.  Postoperative instructions to rest, elevate, ice the left foot.  Dressing is to remain clean, dry, intact until rounds tomorrow.             Implants: none           Complications:  None; patient tolerated the procedure well.           Disposition: PACU - hemodynamically stable. Then to floor           Condition: stable

## 2025-01-28 NOTE — ASSESSMENT & PLAN NOTE
Creatine 2.9 on admit  IV fluids, recheck   Estimated Creatinine Clearance: 31.7 mL/min (A) (based on SCr of 2.9 mg/dL (H)).  Monitor UOP and serial BMP and adjust therapy as needed.   Renally dose meds.   Avoid nephrotoxic medications and procedures.  BL Cr 1.4 on most recent, 2022

## 2025-01-28 NOTE — TRANSFER OF CARE
Anesthesia Transfer of Care Note    Patient: Robel Desai    Procedure(s) Performed: Procedure(s) (LRB):  AMPUTATION, TOE 4th TOE (Left)    Patient location: PACU    Anesthesia Type: general    Transport from OR: Transported from OR on room air with adequate spontaneous ventilation    Post pain: adequate analgesia    Post assessment: no apparent anesthetic complications and tolerated procedure well    Post vital signs: stable    Level of consciousness: awake, alert and oriented    Nausea/Vomiting: no nausea/vomiting    Complications: none    Transfer of care protocol was followed    Last vitals: Visit Vitals  BP (!) 165/87   Pulse 93   Temp 36.6 °C (97.9 °F) (Oral)   Resp 19   Wt 81.6 kg (180 lb)   SpO2 99%   BMI 30.90 kg/m²

## 2025-01-28 NOTE — ANESTHESIA PREPROCEDURE EVALUATION
01/28/2025  Robel Desai is a 44 y.o., male.      Pre-op Assessment    I have reviewed the Patient Summary Reports.     I have reviewed the Nursing Notes. I have reviewed the NPO Status.   I have reviewed the Medications.     Review of Systems  Anesthesia Hx:  No problems with previous Anesthesia                Social:  Non-Smoker, Alcohol Use       Hematology/Oncology:    Oncology Normal    -- Anemia:                                  EENT/Dental:  EENT/Dental Normal           Cardiovascular:     Hypertension                                          Pulmonary:  Pulmonary Normal                       Renal/:  Chronic Renal Disease, CKD                Musculoskeletal:     osteomyelitis            Neurological:  Neurology Normal                                      Endocrine:  Diabetes, poorly controlled, type 2, using insulin         Obesity / BMI > 30  Psych:  Psychiatric Normal                    Physical Exam  General: Well nourished, Cooperative, Alert and Oriented    Airway:  Mallampati: II / II  Mouth Opening: Normal  TM Distance: Normal  Tongue: Normal  Neck ROM: Normal ROM    Dental:  Intact    Chest/Lungs:  Clear to auscultation, Normal Respiratory Rate    Heart:  Rate: Normal  Rhythm: Regular Rhythm  Sounds: Normal        Anesthesia Plan  Type of Anesthesia, risks & benefits discussed:    Anesthesia Type: MAC, Gen Natural Airway  Intra-op Monitoring Plan: Standard ASA Monitors  Induction:  IV  Informed Consent: Informed consent signed with the Patient and all parties understand the risks and agree with anesthesia plan.  All questions answered.   ASA Score: 3  Day of Surgery Review of History & Physical: H&P Update referred to the surgeon/provider.    Ready For Surgery From Anesthesia Perspective.     .

## 2025-01-28 NOTE — CLINICAL REVIEW
Sepsis Screen (most recent)       Sepsis Screen (IP) - 01/28/25 0703       Is the patient's history or complaint suggestive of a possible infection? Yes  -    Are there at least two of the following signs and symptoms present? Yes  -    Sepsis signs/symptoms - Tachycardia Tachycardia     >90  -    Sepsis signs/symptoms - WBC WBC < 4,000 or WBC > 12,000  -    Are any of the following organ dysfunction criteria present and not considered to be due to a chronic condition? Yes  -    Organ Dysfunction Criteria Creatinine > 2.0  -    Initiate Sepsis Protocol No  -    Reason sepsis not considered Pt. receiving appropriate management   bc in process, on abx -              User Key  (r) = Recorded By, (t) = Taken By, (c) = Cosigned By      Initials Name    Reina Presley RN

## 2025-01-28 NOTE — ED TRIAGE NOTES
Pt presents to the ED with complaints of left foot pain. Pt left foot is swollen with pus filled pocket between 2nd and  3rd toe. Pt denies numbness,  tingling , or pain to feet. Pt denies fever but report chills. Pt AAOx4, family at bedside.

## 2025-01-28 NOTE — SUBJECTIVE & OBJECTIVE
Past Medical History:   Diagnosis Date    Diabetes mellitus     Hypertension        Past Surgical History:   Procedure Laterality Date    APPENDECTOMY         Review of patient's allergies indicates:  No Known Allergies    No current facility-administered medications on file prior to encounter.     Current Outpatient Medications on File Prior to Encounter   Medication Sig    aspirin (ECOTRIN) 81 MG EC tablet Take 81 mg by mouth once daily.    glipiZIDE 5 MG TR24 Take 5 mg by mouth 2 (two) times a day.    JARDIANCE 10 mg tablet Take 10 mg by mouth once daily.    LANTUS SOLOSTAR U-100 INSULIN 100 unit/mL (3 mL) InPn pen Inject 30 Units into the skin 2 (two) times a day.    lisinopriL (PRINIVIL,ZESTRIL) 20 MG tablet Take 1 tablet by mouth once daily.    metFORMIN (GLUCOPHAGE) 1000 MG tablet Take 1,000 mg by mouth 2 (two) times daily with meals.    amLODIPine (NORVASC) 5 MG tablet Take 1 tablet (5 mg total) by mouth once daily.    insulin aspart protamine-insulin aspart (NOVOLOG 70/30) 100 unit/mL (70-30) InPn pen Inject 20 Units into the skin after dinner.    insulin aspart U-100 (NOVOLOG FLEXPEN U-100 INSULIN) 100 unit/mL (3 mL) InPn pen ADMINISTER 25 UNITS UNDER THE SKIN TWICE DAILY    lisinopriL (PRINIVIL,ZESTRIL) 20 MG tablet Take 1 tablet (20 mg total) by mouth once daily.    ondansetron (ZOFRAN) 4 MG tablet Take 1 tablet (4 mg total) by mouth every 8 (eight) hours as needed for Nausea.     Family History       Problem Relation (Age of Onset)    Diabetes Mother          Tobacco Use    Smoking status: Never     Passive exposure: Never    Smokeless tobacco: Never   Substance and Sexual Activity    Alcohol use: Yes     Comment: occasionally     Drug use: No    Sexual activity: Not on file     Review of Systems   Constitutional:  Positive for activity change and fever. Negative for unexpected weight change.   HENT:  Negative for trouble swallowing and voice change.    Eyes:  Negative for photophobia and visual  disturbance.   Respiratory:  Negative for cough and shortness of breath.    Cardiovascular:  Negative for chest pain, palpitations and leg swelling.   Gastrointestinal:  Negative for abdominal distention, abdominal pain, blood in stool, constipation, diarrhea, nausea and vomiting.   Endocrine: Negative for polydipsia and polyuria.   Genitourinary:  Negative for difficulty urinating, dysuria, hematuria and urgency.   Musculoskeletal:  Positive for gait problem and joint swelling.   Skin:  Positive for color change and wound. Negative for pallor and rash.   Allergic/Immunologic: Negative for immunocompromised state.   Neurological:  Negative for seizures, syncope, facial asymmetry and weakness.   Psychiatric/Behavioral:  Negative for agitation, behavioral problems and confusion.      Objective:     Vital Signs (Most Recent):  Temp: 97.5 °F (36.4 °C) (01/28/25 0630)  Pulse: 88 (01/28/25 0730)  Resp: 19 (01/28/25 0730)  BP: (!) 144/72 (01/28/25 0730)  SpO2: 99 % (01/28/25 0730) Vital Signs (24h Range):  Temp:  [97.5 °F (36.4 °C)-99 °F (37.2 °C)] 97.5 °F (36.4 °C)  Pulse:  [] 88  Resp:  [16-26] 19  SpO2:  [99 %-100 %] 99 %  BP: (133-164)/(72-91) 144/72     Weight: 81.6 kg (180 lb)  Body mass index is 30.9 kg/m².     Physical Exam  Vitals and nursing note reviewed.   Constitutional:       General: He is not in acute distress.     Appearance: He is well-developed. He is ill-appearing. He is not diaphoretic.   HENT:      Head: Normocephalic and atraumatic.      Mouth/Throat:      Mouth: Mucous membranes are moist.   Eyes:      General: No scleral icterus.     Pupils: Pupils are equal, round, and reactive to light.   Neck:      Thyroid: No thyromegaly.   Cardiovascular:      Rate and Rhythm: Normal rate and regular rhythm.      Heart sounds: No murmur heard.  Pulmonary:      Effort: Pulmonary effort is normal.      Breath sounds: Normal breath sounds. No stridor. No wheezing or rales.   Abdominal:      General: There  is no distension.      Palpations: Abdomen is soft.      Tenderness: There is no guarding.   Musculoskeletal:         General: Swelling and tenderness present. No deformity. Normal range of motion.      Cervical back: Normal range of motion.   Skin:     General: Skin is warm.      Capillary Refill: Capillary refill takes less than 2 seconds.      Findings: Erythema and lesion present.   Neurological:      Mental Status: He is alert and oriented to person, place, and time. Mental status is at baseline.      Cranial Nerves: No cranial nerve deficit.      Motor: No weakness.      Gait: Gait abnormal.   Psychiatric:         Mood and Affect: Mood normal.         Behavior: Behavior normal.              CRANIAL NERVES     CN III, IV, VI   Pupils are equal, round, and reactive to light.           Recent Results (from the past 24 hours)   POCT glucose    Collection Time: 01/28/25  1:39 AM   Result Value Ref Range    POCT Glucose 213 (H) 70 - 110 mg/dL   CBC auto differential    Collection Time: 01/28/25  5:23 AM   Result Value Ref Range    WBC 13.60 (H) 3.90 - 12.70 K/uL    RBC 4.37 (L) 4.60 - 6.20 M/uL    Hemoglobin 12.5 (L) 14.0 - 18.0 g/dL    Hematocrit 38.1 (L) 40.0 - 54.0 %    MCV 87 82 - 98 fL    MCH 28.6 27.0 - 31.0 pg    MCHC 32.8 32.0 - 36.0 g/dL    RDW 12.3 11.5 - 14.5 %    Platelets 452 (H) 150 - 450 K/uL    MPV 9.3 9.2 - 12.9 fL    Immature Granulocytes 0.4 0.0 - 0.5 %    Gran # (ANC) 9.8 (H) 1.8 - 7.7 K/uL    Immature Grans (Abs) 0.05 (H) 0.00 - 0.04 K/uL    Lymph # 2.5 1.0 - 4.8 K/uL    Mono # 1.1 (H) 0.3 - 1.0 K/uL    Eos # 0.2 0.0 - 0.5 K/uL    Baso # 0.04 0.00 - 0.20 K/uL    nRBC 0 0 /100 WBC    Gran % 71.7 38.0 - 73.0 %    Lymph % 18.1 18.0 - 48.0 %    Mono % 8.2 4.0 - 15.0 %    Eosinophil % 1.3 0.0 - 8.0 %    Basophil % 0.3 0.0 - 1.9 %    Differential Method Automated    Comprehensive metabolic panel    Collection Time: 01/28/25  5:23 AM   Result Value Ref Range    Sodium 139 136 - 145 mmol/L    Potassium  4.4 3.5 - 5.1 mmol/L    Chloride 103 95 - 110 mmol/L    CO2 20 (L) 23 - 29 mmol/L    Glucose 224 (H) 70 - 110 mg/dL    BUN 46 (H) 6 - 20 mg/dL    Creatinine 2.9 (H) 0.5 - 1.4 mg/dL    Calcium 10.0 8.7 - 10.5 mg/dL    Total Protein 8.9 (H) 6.0 - 8.4 g/dL    Albumin 3.4 (L) 3.5 - 5.2 g/dL    Total Bilirubin 0.3 0.1 - 1.0 mg/dL    Alkaline Phosphatase 120 40 - 150 U/L    AST 23 10 - 40 U/L    ALT 34 10 - 44 U/L    eGFR 27 (A) >60 mL/min/1.73 m^2    Anion Gap 16 8 - 16 mmol/L   Magnesium    Collection Time: 01/28/25  5:23 AM   Result Value Ref Range    Magnesium 2.3 1.6 - 2.6 mg/dL   Procalcitonin    Collection Time: 01/28/25  5:23 AM   Result Value Ref Range    Procalcitonin 0.15 <0.25 ng/mL   Sedimentation rate    Collection Time: 01/28/25  5:23 AM   Result Value Ref Range    Sed Rate >120 (H) 0 - 23 mm/Hr   Beta - Hydroxybutyrate, Serum    Collection Time: 01/28/25  5:23 AM   Result Value Ref Range    Beta-Hydroxybutyrate 0.2 0.0 - 0.5 mmol/L   Rapid HIV    Collection Time: 01/28/25  5:23 AM   Result Value Ref Range    HIV Rapid Testing Non-Reactive Negative   ISTAT Lactate    Collection Time: 01/28/25  5:33 AM   Result Value Ref Range    POC Lactate 1.44 0.5 - 2.2 mmol/L    Sample VENOUS     Site [place]     Allens Test N/A     DelSys Nasal Can    POCT glucose    Collection Time: 01/28/25  8:00 AM   Result Value Ref Range    POCT Glucose 197 (H) 70 - 110 mg/dL   Urinalysis, Reflex to Urine Culture Urine, Clean Catch    Collection Time: 01/28/25  8:11 AM    Specimen: Urine   Result Value Ref Range    Specimen UA Urine, Clean Catch     Color, UA Yellow Yellow, Straw, Janice    Appearance, UA Clear Clear    pH, UA 7.0 5.0 - 8.0    Specific Gravity, UA 1.020 1.005 - 1.030    Protein, UA 2+ (A) Negative    Glucose, UA 4+ (A) Negative    Ketones, UA Negative Negative    Bilirubin (UA) Negative Negative    Occult Blood UA Negative Negative    Nitrite, UA Negative Negative    Urobilinogen, UA Negative <2.0 EU/dL     Leukocytes, UA Negative Negative   Urinalysis Microscopic    Collection Time: 01/28/25  8:11 AM   Result Value Ref Range    RBC, UA 0 0 - 4 /hpf    WBC, UA 0 0 - 5 /hpf    Bacteria None None-Occ /hpf    Yeast, UA None None    Hyaline Casts, UA 0 0-1/lpf /lpf    Microscopic Comment SEE COMMENT        Microbiology Results (last 7 days)       Procedure Component Value Units Date/Time    Culture, Anaerobic [[id number]] Collected: 01/28/25 0938    Order Status: Sent Specimen: Wound from Toe, Left Foot Updated: 01/28/25 0943    Aerobic culture [[id number]] Collected: 01/28/25 0938    Order Status: Sent Specimen: Wound from Toe, Left Foot Updated: 01/28/25 0943    Gram stain [[id number]] Collected: 01/28/25 0938    Order Status: Sent Specimen: Wound from Toe, Left Foot Updated: 01/28/25 0943    Blood culture x two cultures. Draw prior to antibiotics. [[id number]] Collected: 01/28/25 0524    Order Status: Sent Specimen: Blood from Peripheral, Antecubital, Left Updated: 01/28/25 0533    Blood culture x two cultures. Draw prior to antibiotics. [[id number]] Collected: 01/28/25 0524    Order Status: Sent Specimen: Blood from Peripheral, Antecubital, Left Updated: 01/28/25 0533             Imaging Results               X-Ray Foot Complete Left (Final result)  Result time 01/28/25 06:02:03      Final result by Danie Bartholomew MD (01/28/25 06:02:03)                   Impression:      Soft tissue swelling of the 4th toe left foot with some air in the soft tissues of the webspace between the 3rd and 4th toes suggesting soft tissue infection.  There is some adjacent bone destruction involving the distal aspect of the proximal phalanx of the 4th toe suggesting osteomyelitis.  Further evaluation can be obtained with MRI.    This report was flagged in Epic as abnormal.      Electronically signed by: Danie Bartholomew MD  Date:    01/28/2025  Time:    06:02               Narrative:    EXAMINATION:  XR FOOT COMPLETE 3 VIEW  LEFT    CLINICAL HISTORY:  .  Type 2 diabetes mellitus with other skin complications    TECHNIQUE:  AP, lateral and oblique views of the left foot were performed.    COMPARISON:  None    FINDINGS:  No fracture or dislocation.  Lisfranc articulation is congruent.  Cartilage spaces are maintained.  Vascular calcifications present.  Soft tissue swelling of the 4th toe left foot with some air in the soft tissues of the webspace between the 3rd and 4th toes suggesting soft tissue infection.  There is some adjacent bone destruction involving the distal aspect of the proximal phalanx of the 4th toe suggesting osteomyelitis.  No radiopaque foreign body.

## 2025-01-28 NOTE — ED PROVIDER NOTES
Encounter Date: 1/28/2025       History     Chief Complaint   Patient presents with    Abscess     To top of left foot X 3-4 days, no drainage noted     44-year-old male with DM2, HTN, CKD G3b/A1, presents via personal transportation to [facility] ER with left foot swelling, pain, redness, and purulent discharge, ongoing and worsening for the last 4 days.  Patient denies foot injury.  Patient states he has occasionally had foot inflammation, but nothing like this.  No pain meds prior to arrival.  Patient denies neuropathy or foot numbness at baseline.    Patient is generally compliant with medications for blood pressure and diabetes.  However, he did not have his insulin last week because he ran out and had to take an emergency trip to [place] () due to the death of his sister.  He was staying with his grandmother, and was showering in salt water.  Otherwise, he denies water exposure.    Patient just returned home from [place] last night.  He immediately presented to this ER.      Patient is originally from the [place] and speaks [place].  AM[employer] translation used, Ashish, #[id number], and Vika, #[id number].    Review of patient's allergies indicates:  No Known Allergies  Past Medical History:   Diagnosis Date    Diabetes mellitus     Hypertension      Past Surgical History:   Procedure Laterality Date    APPENDECTOMY       Family History   Problem Relation Name Age of Onset    Diabetes Mother       Social History     Tobacco Use    Smoking status: Never     Passive exposure: Never    Smokeless tobacco: Never   Substance Use Topics    Alcohol use: Yes     Comment: occasionally     Drug use: No     Review of Systems   Constitutional:  Negative for fever.   Skin:  Positive for rash and wound.       Physical Exam     Initial Vitals [01/28/25 0135]   BP Pulse Resp Temp SpO2   (!) 145/82 100 16 99 °F (37.2 °C) 100 %      MAP       --         Physical Exam    Nursing note and vitals  reviewed.  Constitutional: He appears well-developed and well-nourished. He is not diaphoretic.   Awake, alert, nontoxic.   HENT:   Head: Normocephalic and atraumatic. Mouth/Throat: Oropharynx is clear and moist.   Eyes: Conjunctivae and EOM are normal. Pupils are equal, round, and reactive to light.   Neck: Neck supple.   Normal range of motion.  Cardiovascular:  Normal rate, regular rhythm, normal heart sounds and intact distal pulses.           No murmur heard.  Pulmonary/Chest: Breath sounds normal. No respiratory distress. He has no wheezes. He has no rhonchi. He has no rales.   Abdominal: Abdomen is soft. There is no abdominal tenderness.   Musculoskeletal:         General: Edema present. No tenderness. Normal range of motion.      Cervical back: Normal range of motion and neck supple.      Comments: Left foot dorsum diffusely swollen with 1+ to 2+ edema extending into left lower leg.  Mild erythema to dorsum of left foot with warmth.  3mm circular wound to webspace between 3rd and 4th digits with skin breakdown; tracks approximately 7mm (probed with sterile qtip).  Profuse purulent discharge.  Blister also to 4th digit lateral aspect.  Some chronic skin changes to 5th digit medial aspect.  Discoloration to plantar surface at base of 3rd, 4th, 5th toes.  4th toe primarily is edematous and darkened.  See photos below.     Neurological: He is alert and oriented to person, place, and time.   Moving all extremities   Skin: Skin is warm and dry. There is erythema.   Psychiatric: He has a normal mood and affect.                                     ED Course   Procedures  Labs Reviewed   CBC W/ AUTO DIFFERENTIAL - Abnormal       Result Value    WBC 13.60 (*)     RBC 4.37 (*)     Hemoglobin 12.5 (*)     Hematocrit 38.1 (*)     MCV 87      MCH 28.6      MCHC 32.8      RDW 12.3      Platelets 452 (*)     MPV 9.3      Immature Granulocytes 0.4      Gran # (ANC) 9.8 (*)     Immature Grans (Abs) 0.05 (*)     Lymph # 2.5       Mono # 1.1 (*)     Eos # 0.2      Baso # 0.04      nRBC 0      Gran % 71.7      Lymph % 18.1      Mono % 8.2      Eosinophil % 1.3      Basophil % 0.3      Differential Method Automated     COMPREHENSIVE METABOLIC PANEL - Abnormal    Sodium 139      Potassium 4.4      Chloride 103      CO2 20 (*)     Glucose 224 (*)     BUN 46 (*)     Creatinine 2.9 (*)     Calcium 10.0      Total Protein 8.9 (*)     Albumin 3.4 (*)     Total Bilirubin 0.3      Alkaline Phosphatase 120      AST 23      ALT 34      eGFR 27 (*)     Anion Gap 16     SEDIMENTATION RATE - Abnormal    Sed Rate >120 (*)    POCT GLUCOSE - Abnormal    POCT Glucose 213 (*)    POCT GLUCOSE - Abnormal    POCT Glucose 197 (*)    CULTURE, BLOOD   CULTURE, BLOOD   GRAM STAIN   CULTURE, AEROBIC  (SPECIFY SOURCE)   CULTURE, ANAEROBIC   MAGNESIUM    Magnesium 2.3     PROCALCITONIN    Procalcitonin 0.15     BETA - HYDROXYBUTYRATE, SERUM    Beta-Hydroxybutyrate 0.2     RAPID HIV    HIV Rapid Testing Non-Reactive     URINALYSIS, REFLEX TO URINE CULTURE   C-REACTIVE PROTEIN   HEMOGLOBIN A1C   LACTIC ACID, PLASMA   ISTAT LACTATE    POC Lactate 1.44      Sample VENOUS      Site [place]      Allens Test N/A      DelSys Nasal Can     POCT GLUCOSE MONITORING CONTINUOUS   POCT GLUCOSE MONITORING CONTINUOUS     EKG Readings: (Independently Interpreted)   05:16: NSR, HR 97. Normal axis. No ectopy. No STEMI.        Imaging Results               X-Ray Foot Complete Left (Final result)  Result time 01/28/25 06:02:03      Final result by Danie Bartholomew MD (01/28/25 06:02:03)                   Impression:      Soft tissue swelling of the 4th toe left foot with some air in the soft tissues of the webspace between the 3rd and 4th toes suggesting soft tissue infection.  There is some adjacent bone destruction involving the distal aspect of the proximal phalanx of the 4th toe suggesting osteomyelitis.  Further evaluation can be obtained with MRI.    This report was flagged in  Epic as abnormal.      Electronically signed by: Danie Bartholomew MD  Date:    01/28/2025  Time:    06:02               Narrative:    EXAMINATION:  XR FOOT COMPLETE 3 VIEW LEFT    CLINICAL HISTORY:  .  Type 2 diabetes mellitus with other skin complications    TECHNIQUE:  AP, lateral and oblique views of the left foot were performed.    COMPARISON:  None    FINDINGS:  No fracture or dislocation.  Lisfranc articulation is congruent.  Cartilage spaces are maintained.  Vascular calcifications present.  Soft tissue swelling of the 4th toe left foot with some air in the soft tissues of the webspace between the 3rd and 4th toes suggesting soft tissue infection.  There is some adjacent bone destruction involving the distal aspect of the proximal phalanx of the 4th toe suggesting osteomyelitis.  No radiopaque foreign body.                                       Medications   vancomycin (VANCOCIN) 1,750 mg in 0.9% NaCl 500 mL IVPB (1,750 mg Intravenous New Bag 1/28/25 0621)   vancomycin - pharmacy to dose (has no administration in time range)   albuterol-ipratropium 2.5 mg-0.5 mg/3 mL nebulizer solution 3 mL (has no administration in time range)   melatonin tablet 6 mg (has no administration in time range)   ondansetron disintegrating tablet 8 mg (has no administration in time range)   prochlorperazine injection Soln 5 mg (has no administration in time range)   polyethylene glycol packet 17 g (has no administration in time range)   senna-docusate 8.6-50 mg per tablet 1 tablet (has no administration in time range)   simethicone chewable tablet 80 mg (has no administration in time range)   acetaminophen tablet 650 mg (has no administration in time range)   naloxone 0.4 mg/mL injection 0.02 mg (has no administration in time range)   potassium bicarbonate disintegrating tablet 50 mEq (has no administration in time range)   potassium bicarbonate disintegrating tablet 35 mEq (has no administration in time range)   potassium  "bicarbonate disintegrating tablet 60 mEq (has no administration in time range)   magnesium oxide tablet 800 mg (has no administration in time range)   magnesium oxide tablet 800 mg (has no administration in time range)   potassium, sodium phosphates 280-160-250 mg packet 2 packet (has no administration in time range)   potassium, sodium phosphates 280-160-250 mg packet 2 packet (has no administration in time range)   potassium, sodium phosphates 280-160-250 mg packet 2 packet (has no administration in time range)   glucose chewable tablet 16 g (has no administration in time range)   glucose chewable tablet 24 g (has no administration in time range)   dextrose 50% injection 12.5 g (has no administration in time range)   dextrose 50% injection 25 g (has no administration in time range)   glucagon (human recombinant) injection 1 mg (has no administration in time range)   ceFEPIme injection 2 g (has no administration in time range)   amLODIPine tablet 2.5 mg (has no administration in time range)   aspirin EC tablet 81 mg (has no administration in time range)   dextrose 50% injection 12.5 g (has no administration in time range)   glucagon (human recombinant) injection 1 mg (has no administration in time range)   insulin aspart U-100 pen 0-5 Units (has no administration in time range)   piperacillin-tazobactam (ZOSYN) 4.5 g in D5W 100 mL IVPB (MB+) (0 g Intravenous Stopped 1/28/25 0621)   sodium chloride 0.9% bolus 1,000 mL 1,000 mL (1,000 mLs Intravenous New Bag 1/28/25 0636)     Medical Decision Making  44-year-old male with diabetes now with left foot swelling and purulent discharge between 3rd and 4th toes.    Differential includes abscess, cellulitis, diabetic foot infection, osteomyelitis, other.    Exam concerning for diabetic foot infection, deep space infection, osteomyelitis.    EKG no STEMI.    WBC 13.60.  BUN 46 / creatinine 2.9, eGFR 27, no recent priors.  Sed rate >120.      XR: "Soft tissue swelling of the " "4th toe left foot with some air in the soft tissues of the webspace between the 3rd and 4th toes suggesting soft tissue infection.  There is some adjacent bone destruction involving the distal aspect of the proximal phalanx of the 4th toe suggesting osteomyelitis."    Patient received IV NS 1L, IV vancomycin, and IV zosyn in the ER.  He declined pain meds.    Patient requires admission for IV abx for diabetic foot infection and osteomyelitis.  He will also require correction of his acute on chronic kidney injury.  He will require evaluation by podiatry and likely surgical debridement.    I discussed patient via [employer] Secure Chat with hospitalist Dr. Jay Palacios who accepted patient to his service.    I updated patient and wife as to diagnosis and plan.          Amount and/or Complexity of Data Reviewed  Labs: ordered.  Radiology: ordered.  ECG/medicine tests: ordered.    Risk  Decision regarding hospitalization.                                      Clinical Impression:  Final diagnoses:  [Z01.810] Preop cardiovascular exam  [E11.628, L08.9] Diabetic foot infection  [M86.9] Osteomyelitis (Primary)  [M86.9] Osteomyelitis of fourth toe of left foot  [L02.91] Purulent abscess  [N17.9, N18.9] Acute renal failure superimposed on chronic kidney disease, unspecified acute renal failure type, unspecified CKD stage          ED Disposition Condition    Admit Stable                Dagmar Perez MD  01/28/25 0821       Dagmar Perez MD  01/28/25 0840    "

## 2025-01-28 NOTE — HPI
Robel Desai is a 44 y.o. male who has a past medical history of Diabetes mellitus, CKD2, and Hypertension, presented to the ED with CC of Foot Pain.    Patient has Left foot sweling, erythema and purulent discharge. Noticed it about 4 days ago, and has progressively worsened. On presentation WBC# 14k and ESR >120. XRAY shows soft tissue swelling of the 4th toe left foot with air in the soft tissues of the webspace between the 3rd and 4th toes suggesting soft tissue infection, as well as adjacent bone destruction involving the distal aspect of the proximal phalanx of the 4th toe suggesting osteomyelitis. Given V+Z in ED. Cr 2.9 with ?BL cr 1.4 two years ago. Switched to V+Cefepime for now. Podiatry consulted. Denies CP, AP or SOB.

## 2025-01-28 NOTE — ASSESSMENT & PLAN NOTE
Patient states that he takes 10U insulin in AM and 40U in PM but is unclear what type  DA diet, accuchecks, hypoglycemic protocol  SSI only while NPO  start basal bolus if glucoses consistently >180 and eating

## 2025-01-28 NOTE — ED NOTES
Pt care assumed. Pt resting in ED bed. Denies any complaints at this time. Fluids infusing as ordered. Vancomycin infusing as ordered. Call light within reach. Will continue to monitor.

## 2025-01-28 NOTE — ASSESSMENT & PLAN NOTE
Apparent infection of left forefoot/toe on exam  XRAY showing:  Soft tissue swelling of the 4th toe left foot  Air in the soft tissues of the webspace between the 3rd and 4th toes suggesting soft tissue infection  Adjacent bone destruction involving the distal aspect of the proximal phalanx of the 4th toe suggesting osteomyelitis  On Vanc and cefepime, poor renal fxn  Podiatry consulted

## 2025-01-28 NOTE — PROGRESS NOTES
"Pharmacokinetic Initial Assessment: IV Vancomycin    Assessment/Plan:    Initiate intravenous vancomycin with loading dose of 1750 mg once with subsequent doses when random concentrations are less than 20 mcg/mL  Desired empiric serum trough concentration is 10 to 20 mcg/mL  Draw vancomycin random level on 1/28/25 at 1830.  Pharmacy will continue to follow and monitor vancomycin.      Please contact pharmacy at extension [phone] with any questions regarding this assessment.     Thank you for the consult,   Anjelica Rosales       Patient brief summary:  Robel Desai is a 44 y.o. male initiated on antimicrobial therapy with IV Vancomycin for treatment of suspected bone/joint infection    Drug Allergies:   Review of patient's allergies indicates:  No Known Allergies    Actual Body Weight:   81.6 kg    Renal Function:   Estimated Creatinine Clearance: 31.7 mL/min (A) (based on SCr of 2.9 mg/dL (H)).,     Dialysis Method (if applicable):  N/A    CBC (last 72 hours):  Recent Labs   Lab Result Units 01/28/25  0523   WBC K/uL 13.60*   Hemoglobin g/dL 12.5*   Hematocrit % 38.1*   Platelets K/uL 452*   Gran % % 71.7   Lymph % % 18.1   Mono % % 8.2   Eosinophil % % 1.3   Basophil % % 0.3   Differential Method  Automated       Metabolic Panel (last 72 hours):  Recent Labs   Lab Result Units 01/28/25  0523   Sodium mmol/L 139   Potassium mmol/L 4.4   Chloride mmol/L 103   CO2 mmol/L 20*   Glucose mg/dL 224*   BUN mg/dL 46*   Creatinine mg/dL 2.9*   Albumin g/dL 3.4*   Total Bilirubin mg/dL 0.3   Alkaline Phosphatase U/L 120   AST U/L 23   ALT U/L 34   Magnesium mg/dL 2.3       Drug levels (last 3 results):  No results for input(s): "VANCOMYCINRA", "VANCORANDOM", "VANCOMYCINPE", "VANCOPEAK", "VANCOMYCINTR", "VANCOTROUGH" in the last 72 hours.    Microbiologic Results:  Microbiology Results (last 7 days)       Procedure Component Value Units Date/Time    Gram stain [[id number]]     Order Status: No result Specimen: Wound     Aerobic " culture [[id number]]     Order Status: No result Specimen: Wound     Culture, Anaerobic [[id number]]     Order Status: No result Specimen: Wound     Blood culture x two cultures. Draw prior to antibiotics. [[id number]] Collected: 01/28/25 0524    Order Status: Sent Specimen: Blood from Peripheral, Antecubital, Left Updated: 01/28/25 0533    Blood culture x two cultures. Draw prior to antibiotics. [[id number]] Collected: 01/28/25 0524    Order Status: Sent Specimen: Blood from Peripheral, Antecubital, Left Updated: 01/28/25 0533

## 2025-01-28 NOTE — PLAN OF CARE
Case Management Assessment     PCP: Ashish Lynnero  Pharmacy: [facility]    Patient Arrived From: home   Existing Help at Home: none    Barriers to Discharge: none    Discharge Plan:    A. Home    B. Home     SW completed initial assessment and discussed discharge planning with patient at his bedside. Patient lives alone, his brothers are his support. Patient's brother Leland will provide transportation for him to get home when discharge from the hospital.     01/28/25 1213   Discharge Assessment   Assessment Type Discharge Planning Assessment   Confirmed/corrected address, phone number and insurance Yes   Confirmed Demographics Correct on Facesheet   Source of Information patient   Communicated FLETCHER with patient/caregiver Date not available/Unable to determine   Reason For Admission Osteomyelitis of left foot   People in Home alone   Do you expect to return to your current living situation? Yes   Do you have help at home or someone to help you manage your care at home? No   Prior to hospitilization cognitive status: Alert/Oriented   Current cognitive status: Alert/Oriented   Walking or Climbing Stairs Difficulty no   Dressing/Bathing Difficulty no   Equipment Currently Used at Home none   Readmission within 30 days? No   Patient currently being followed by outpatient case management? No   Do you currently have service(s) that help you manage your care at home? No   Do you take prescription medications? Yes   Do you have prescription coverage? Yes   Coverage Medicaid   Do you have any problems affording any of your prescribed medications? No   Is the patient taking medications as prescribed? yes   Who is going to help you get home at discharge? Leland Desai (brother) [phone]   How do you get to doctors appointments? family or friend will provide   Are you on dialysis? No   Do you take coumadin? No   Discharge Plan A Home with family   Discharge Plan B Home with family   DME  Needed Upon Discharge  none   Discharge Plan discussed with: Patient   Transition of Care Barriers None

## 2025-01-28 NOTE — CONSULTS
[facility] - Emergency Dept  Podiatry  Consult Note    Patient Name: Robel Deasi  MRN: [id number]  Admission Date: 1/28/2025  Hospital Length of Stay: 0 days  Attending Physician: Jay Palacios MD  Primary Care Provider: Ashish Greer [facility] -     Inpatient consult to Podiatry  Consult performed by: Ivis Wilson DPM  Consult ordered by: Jay Palacios MD  Reason for consult: OM of foot  Assessment/Recommendations:   Discussed options for treatment of MRI confirmed OM in the presence of gas in the soft tissues.  Patient informed that his best option is removal of the 4th digit left open to drain to treat both the gas and the bone infection until allow for drainage.  He is informed that this will be one of potentially multiple surgeries in his staged procedure with the hope that we will be able to close it in the future but with no guarantee that he will not need further amputation.     The risks, benefits, complications, treatment options, and expected outcomes were discussed with the patient. The risks and potential complications of their problem and purposed treatment include but are not limited to infection, nerve injury, vascular injury, persistent pain, potential skin necrosis, deep vein thrombosis, possible pulmonary embolus,and complications of the anesthetics.  The patient concurred with the proposed plan, giving informed consent.  The site of surgery properly noted/marked.    To OR within the next hour.    Short-term goals include maintaining good offloading and minimizing bioburden, promoting granulation and epithelialization to healing.  Long-term goals include keeping the wound healed by good offloading and medical management under the direction of internist.       We will continue to follow and work in partnership with treatment team on how to best treat patient concern and diagnosis.            Subjective:     History of Present Illness: Robel Desai is a 44 y.o. male with  has a past  medical history of Chronic kidney disease, stage 2, mildly decreased GFR, Diabetes mellitus, and Hypertension. Consulted to the podiatry for evaluation and treatment of  left foot infection   Location: lateral forefoot/toes Onset of the symptoms was several days ago. Precipitating event:  Patient relates that he had had some increased edema to his legs and feet and he wore a tight shoe which he believes resulted in irritation and infection of the toe .  History of injury: no Current symptoms include: swelling and pain . Signs of infection fever, chills, and fatigue   Symptoms have gradually worsened.     The entirety of our encounter and consent was done with the assistance of phone  Charu #[id number]    Shoe gear: Slip-on shoes    Chief Complaint   Patient presents with    Abscess     To top of left foot X 3-4 days, no drainage noted   Per H&P On presentation WBC# 14k and ESR >120. XRAY shows soft tissue swelling of the 4th toe left foot with air in the soft tissues of the webspace between the 3rd and 4th toes suggesting soft tissue infection, as well as adjacent bone destruction involving the distal aspect of the proximal phalanx of the 4th toe suggesting osteomyelitis. Given V+Z in ED. Switched to V+Cefepime for now        Scheduled Meds:   [START ON 1/29/2025] aspirin  81 mg Oral Daily    ceFEPime IV (PEDS and ADULTS)  2 g Intravenous Q12H    melatonin  6 mg Oral Nightly     Continuous Infusions:   0.9% NaCl   Intravenous Continuous 100 mL/hr at 01/28/25 1054 New Bag at 01/28/25 1054     PRN Meds:  Current Facility-Administered Medications:     acetaminophen, 650 mg, Oral, Q4H PRN    albuterol-ipratropium, 3 mL, Nebulization, Q6H PRN    dextrose 50%, 12.5 g, Intravenous, PRN    dextrose 50%, 12.5 g, Intravenous, PRN    dextrose 50%, 25 g, Intravenous, PRN    glucagon (human recombinant), 1 mg, Intramuscular, PRN    glucagon (human recombinant), 1 mg, Intramuscular, PRN    glucose, 16 g, Oral, PRN     glucose, 24 g, Oral, PRN    insulin aspart U-100, 0-5 Units, Subcutaneous, Q6H PRN    magnesium oxide, 800 mg, Oral, PRN    magnesium oxide, 800 mg, Oral, PRN    naloxone, 0.02 mg, Intravenous, PRN    ondansetron, 8 mg, Oral, Q8H PRN    polyethylene glycol, 17 g, Oral, BID PRN    potassium bicarbonate, 35 mEq, Oral, PRN    potassium bicarbonate, 50 mEq, Oral, PRN    potassium bicarbonate, 60 mEq, Oral, PRN    potassium, sodium phosphates, 2 packet, Oral, PRN    potassium, sodium phosphates, 2 packet, Oral, PRN    potassium, sodium phosphates, 2 packet, Oral, PRN    prochlorperazine, 5 mg, Intravenous, Q6H PRN    senna-docusate 8.6-50 mg, 1 tablet, Oral, Daily PRN    simethicone, 1 tablet, Oral, QID PRN    Pharmacy to dose Vancomycin consult, , , Once **AND** vancomycin - pharmacy to dose, , Intravenous, pharmacy to manage frequency    Review of patient's allergies indicates:  No Known Allergies     Past Medical History:   Diagnosis Date    Chronic kidney disease, stage 2, mildly decreased GFR 01/28/2025    Diabetes mellitus     Hypertension      Past Surgical History:   Procedure Laterality Date    APPENDECTOMY         Family History       Problem Relation (Age of Onset)    Diabetes Mother          Tobacco Use    Smoking status: Never     Passive exposure: Never    Smokeless tobacco: Never   Substance and Sexual Activity    Alcohol use: Yes     Comment: occasionally     Drug use: No    Sexual activity: Not on file     Review of Systems   Constitutional:  Positive for activity change, fatigue and fever. Negative for appetite change and chills.   Respiratory:  Negative for cough and shortness of breath.    Cardiovascular:  Positive for leg swelling. Negative for chest pain.   Gastrointestinal:  Negative for diarrhea, nausea and vomiting.   Musculoskeletal:  Positive for myalgias.   Skin:  Positive for color change and wound.   Neurological:  Negative for weakness.        + paresthesia      Objective:     Vital Signs  (Most Recent):  Temp: 97.9 °F (36.6 °C) (01/28/25 1054)  Pulse: 93 (01/28/25 1500)  Resp: 19 (01/28/25 1500)  BP: (!) 165/87 (01/28/25 1500)  SpO2: 99 % (01/28/25 1500) Vital Signs (24h Range):  Temp:  [97.5 °F (36.4 °C)-99 °F (37.2 °C)] 97.9 °F (36.6 °C)  Pulse:  [] 93  Resp:  [15-26] 19  SpO2:  [99 %-100 %] 99 %  BP: (133-165)/(72-91) 165/87     Weight: 81.6 kg (180 lb)  Body mass index is 30.9 kg/m².    Physical Exam  Vitals and nursing note reviewed.   Constitutional:       General: He is not in acute distress.     Appearance: He is well-developed. He is not toxic-appearing or diaphoretic.      Comments: alert and oriented x 3.    Cardiovascular:      Pulses:           Dorsalis pedis pulses are 2+ on the right side and 2+ on the left side.        Posterior tibial pulses are 2+ on the right side and 2+ on the left side.   Pulmonary:      Effort: No respiratory distress.   Musculoskeletal:         General: No deformity.      Right ankle: No tenderness. No lateral malleolus, medial malleolus, AITF ligament, CF ligament or posterior TF ligament tenderness.      Right Achilles Tendon: No defects. Paula's test negative.      Left ankle: No tenderness. No lateral malleolus, medial malleolus, AITF ligament, CF ligament or posterior TF ligament tenderness.      Left Achilles Tendon: No defects. Paula's test negative.      Right foot: No tenderness or bony tenderness.      Left foot: Swelling and tenderness (4th ray) present. No bony tenderness.      Comments: Muscle strength is 5/5 in all groups bilaterally.           Feet:      Left foot:      Skin integrity: Ulcer (see below) present.   Lymphadenopathy:      Comments: No lymphatic streaking     Skin:     General: Skin is warm and dry.      Coloration: Skin is not pale.      Findings: No rash.      Nails: There is no clubbing.   Neurological:      Sensory: No sensory deficit.      Motor: No atrophy.      Comments: Light touch present     Psychiatric:          Attention and Perception: He is attentive.         Mood and Affect: Mood is not anxious. Affect is not inappropriate.         Speech: He is communicative. Speech is not slurred.         Behavior: Behavior is not combative.          01/28/2024  Ulcer location: medial 4th digit, left foot  Signs of infection: edema, erythema, pain, malodor, active purulence  Drainage: Sero-Sanguinous and Purulent  Purulence: Yes  Crepitus/fluctuance: Yes  Periwound: Reddened, Macerated  Base: Fibrotic slough  Depth: cartilage  Probe to bone: Yes                Laboratory:  A1C:   Recent Labs   Lab 01/28/25 0523   HGBA1C 6.5*     CBC:   Recent Labs   Lab 01/28/25 0523   WBC 13.60*   RBC 4.37*   HGB 12.5*   HCT 38.1*   *   MCV 87   MCH 28.6   MCHC 32.8     CMP:   Recent Labs   Lab 01/28/25 0523   *   CALCIUM 10.0   ALBUMIN 3.4*   PROT 8.9*      K 4.4   CO2 20*      BUN 46*   CREATININE 2.9*   ALKPHOS 120   ALT 34   AST 23   BILITOT 0.3     CRP:   Recent Labs   Lab 01/28/25 0523   CRP 46.3*     ESR:   Recent Labs   Lab 01/28/25 0523   SEDRATE >120*     Microbiology Results (last 7 days)       Procedure Component Value Units Date/Time    Blood culture x two cultures. Draw prior to antibiotics. [[id number]] Collected: 01/28/25 0524    Order Status: Completed Specimen: Blood from Peripheral, Antecubital, Left Updated: 01/28/25 1312     Blood Culture, Routine No Growth to date    Narrative:      Aerobic and anaerobic    Blood culture x two cultures. Draw prior to antibiotics. [[id number]] Collected: 01/28/25 0524    Order Status: Completed Specimen: Blood from Peripheral, Antecubital, Left Updated: 01/28/25 1312     Blood Culture, Routine No Growth to date    Narrative:      Aerobic and anaerobic    Gram stain [[id number]] Collected: 01/28/25 0938    Order Status: Completed Specimen: Wound from Toe, Left Foot Updated: 01/28/25 1130     Gram Stain Result Rare WBC's      Rare Gram positive cocci in pairs and  chains    Culture, Anaerobic [[id number]] Collected: 01/28/25 0938    Order Status: Sent Specimen: Wound from Toe, Left Foot Updated: 01/28/25 0943    Aerobic culture [[id number]] Collected: 01/28/25 0938    Order Status: Sent Specimen: Wound from Toe, Left Foot Updated: 01/28/25 0943            Diagnostic Results:  Imaging Results              MRI Forefoot WO Contrast LT (Final result)  Result time 01/28/25 13:48:38      Final result by Mina Barron MD (01/28/25 13:48:38)                   Impression:      Findings concerning for acute osteomyelitis of the 4th toe phalanges.  Surrounding soft tissue induration and swelling of the digit with accompanying susceptibility gas focus.  Findings which can be seen in the setting of nearby open wound or gas-forming infection.    Degree of STIR edema involving the forefoot musculature as detailed.  Appearance is nonspecific though could relate to sequela of infectious or non-infectious myositis.    Subcutaneous edema along the dorsal foot with differential considerations to include noninfectious inflammatory change or cellulitis.      Electronically signed by: Mina Barron  Date:    01/28/2025  Time:    13:48               Narrative:    EXAMINATION:  MRI FOREFOOT WO CONTRAST LT    CLINICAL HISTORY:  Osteomyelitis, foot;Podiatry request for OM penetration/depth;    TECHNIQUE:  Multiplanar, multisequence MR imaging of the left forefoot without the use of intravenous gadolinium IV contrast.    COMPARISON:  Left foot radiograph dated 01/20/2025    FINDINGS:  There is abnormal T1 marrow signal hypointensity throughout the 4th toe proximal and middle phalanges as well as portion of the distal phalanx.  There is corresponding T2 STIR marrow edema throughout the 3rd toe phalanges with surrounding soft tissue induration and swelling of the digit and accompanying susceptibility focus (series 3, image 19).  Additional STIR edema of nearby intrinsic foot musculature  extending along the metatarsal shaft level.  Remaining osseous T1 marrow signal of the forefoot appears maintained.  There is modest STIR edema at the 3rd and 4th metatarsal heads, possibly reactive.  Generalized subcutaneous edema tracking extending along the dorsal foot.  No discrete drainable collection.                                        X-Ray Foot Complete Left (Final result)  Result time 01/28/25 06:02:03      Final result by Danie Bartholomew MD (01/28/25 06:02:03)                   Impression:      Soft tissue swelling of the 4th toe left foot with some air in the soft tissues of the webspace between the 3rd and 4th toes suggesting soft tissue infection.  There is some adjacent bone destruction involving the distal aspect of the proximal phalanx of the 4th toe suggesting osteomyelitis.  Further evaluation can be obtained with MRI.    This report was flagged in Epic as abnormal.      Electronically signed by: Danie Bartholomew MD  Date:    01/28/2025  Time:    06:02               Narrative:    EXAMINATION:  XR FOOT COMPLETE 3 VIEW LEFT    CLINICAL HISTORY:  .  Type 2 diabetes mellitus with other skin complications    TECHNIQUE:  AP, lateral and oblique views of the left foot were performed.    COMPARISON:  None    FINDINGS:  No fracture or dislocation.  Lisfranc articulation is congruent.  Cartilage spaces are maintained.  Vascular calcifications present.  Soft tissue swelling of the 4th toe left foot with some air in the soft tissues of the webspace between the 3rd and 4th toes suggesting soft tissue infection.  There is some adjacent bone destruction involving the distal aspect of the proximal phalanx of the 4th toe suggesting osteomyelitis.  No radiopaque foreign body.                                      Assessment/Plan:     Active Diagnoses:    Diagnosis Date Noted POA    PRINCIPAL PROBLEM:  Osteomyelitis of left foot [M86.9] 01/28/2025 Yes    Chronic kidney disease, stage 2, mildly decreased GFR  [N18.2] 01/28/2025 Yes    Acute kidney injury superimposed on stage 2 chronic kidney disease [N17.9, N18.2] 01/28/2025 Yes    Essential hypertension [I10] 05/06/2018 Yes    Type 2 diabetes mellitus with hyperglycemia, with long-term current use of insulin [E11.65, Z79.4] 09/17/2012 Not Applicable      Problems Resolved During this Admission:     Education about the prevention of limb loss.    Discussed wound healing cycle, skin integrity, ways to care for skin.Counseled patient on the effects of biomechanical pressure, edema, and high blood glucose on healing. He verbalizes understanding that it can increase the chances of delayed healing and this prolonged exposure leads to infection or progression of infection which subsequently can result in loss of limb.    Discussed options for treatment of MRI confirmed OM in the presence of gas in the soft tissues.  Patient informed that his best option is removal of the 4th digit left open to drain to treat both the gas and the bone infection until allow for drainage.  He is informed that this will be one of potentially multiple surgeries in his staged procedure with the hope that we will be able to close it in the future but with no guarantee that he will not need further amputation.     The risks, benefits, complications, treatment options, and expected outcomes were discussed with the patient. The risks and potential complications of their problem and purposed treatment include but are not limited to infection, nerve injury, vascular injury, persistent pain, potential skin necrosis, deep vein thrombosis, possible pulmonary embolus,and complications of the anesthetics.  The patient concurred with the proposed plan, giving informed consent.  The site of surgery properly noted/marked.    To OR within the next hour.    Short-term goals include maintaining good offloading and minimizing bioburden, promoting granulation and epithelialization to healing.  Long-term goals include  keeping the wound healed by good offloading and medical management under the direction of internist.       We will continue to follow and work in partnership with treatment team on how to best treat patient concern and diagnosis.      Thank you for your consult.     Ivis Wilson DPM  Podiatry  [facility] - Emergency Dept

## 2025-01-28 NOTE — H&P
[facility] Emergency Dept  [facility]  [facility] & Physical    Patient Name: Robel Desai  MRN: [id number]  Patient Class: IP- Inpatient  Admission Date: 1/28/2025  Attending Physician: Jay Palacios MD   Primary Care Provider: Ashish Greer [facility] -         Patient information was obtained from patient, past medical records, and ER records.     Subjective:     Principal Problem:Osteomyelitis of left foot    Chief Complaint:   Chief Complaint   Patient presents with    Abscess     To top of left foot X 3-4 days, no drainage noted        HPI:   Robel Desai is a 44 y.o. male who has a past medical history of Diabetes mellitus, CKD2, and Hypertension, presented to the ED with CC of Foot Pain.    Patient has Left foot sweling, erythema and purulent discharge. Noticed it about 4 days ago, and has progressively worsened. On presentation WBC# 14k and ESR >120. XRAY shows soft tissue swelling of the 4th toe left foot with air in the soft tissues of the webspace between the 3rd and 4th toes suggesting soft tissue infection, as well as adjacent bone destruction involving the distal aspect of the proximal phalanx of the 4th toe suggesting osteomyelitis. Given V+Z in ED. Cr 2.9 with ?BL cr 1.4 two years ago. Switched to V+Cefepime for now. Podiatry consulted. Denies CP, AP or SOB.    Past Medical History:   Diagnosis Date    Diabetes mellitus     Hypertension        Past Surgical History:   Procedure Laterality Date    APPENDECTOMY         Review of patient's allergies indicates:  No Known Allergies    No current facility-administered medications on file prior to encounter.     Current Outpatient Medications on File Prior to Encounter   Medication Sig    aspirin (ECOTRIN) 81 MG EC tablet Take 81 mg by mouth once daily.    glipiZIDE 5 MG TR24 Take 5 mg by mouth 2 (two) times a day.    JARDIANCE 10 mg tablet Take 10 mg by mouth once daily.    LANTUS SOLOSTAR U-100 INSULIN 100 unit/mL (3 mL) InPn pen Inject 30  Units into the skin 2 (two) times a day.    lisinopriL (PRINIVIL,ZESTRIL) 20 MG tablet Take 1 tablet by mouth once daily.    metFORMIN (GLUCOPHAGE) 1000 MG tablet Take 1,000 mg by mouth 2 (two) times daily with meals.    amLODIPine (NORVASC) 5 MG tablet Take 1 tablet (5 mg total) by mouth once daily.    insulin aspart protamine-insulin aspart (NOVOLOG 70/30) 100 unit/mL (70-30) InPn pen Inject 20 Units into the skin after dinner.    insulin aspart U-100 (NOVOLOG FLEXPEN U-100 INSULIN) 100 unit/mL (3 mL) InPn pen ADMINISTER 25 UNITS UNDER THE SKIN TWICE DAILY    lisinopriL (PRINIVIL,ZESTRIL) 20 MG tablet Take 1 tablet (20 mg total) by mouth once daily.    ondansetron (ZOFRAN) 4 MG tablet Take 1 tablet (4 mg total) by mouth every 8 (eight) hours as needed for Nausea.     Family History       Problem Relation (Age of Onset)    Diabetes Mother          Tobacco Use    Smoking status: Never     Passive exposure: Never    Smokeless tobacco: Never   Substance and Sexual Activity    Alcohol use: Yes     Comment: occasionally     Drug use: No    Sexual activity: Not on file     Review of Systems   Constitutional:  Positive for activity change and fever. Negative for unexpected weight change.   HENT:  Negative for trouble swallowing and voice change.    Eyes:  Negative for photophobia and visual disturbance.   Respiratory:  Negative for cough and shortness of breath.    Cardiovascular:  Negative for chest pain, palpitations and leg swelling.   Gastrointestinal:  Negative for abdominal distention, abdominal pain, blood in stool, constipation, diarrhea, nausea and vomiting.   Endocrine: Negative for polydipsia and polyuria.   Genitourinary:  Negative for difficulty urinating, dysuria, hematuria and urgency.   Musculoskeletal:  Positive for gait problem and joint swelling.   Skin:  Positive for color change and wound. Negative for pallor and rash.   Allergic/Immunologic: Negative for immunocompromised state.   Neurological:   Negative for seizures, syncope, facial asymmetry and weakness.   Psychiatric/Behavioral:  Negative for agitation, behavioral problems and confusion.      Objective:     Vital Signs (Most Recent):  Temp: 97.5 °F (36.4 °C) (01/28/25 0630)  Pulse: 88 (01/28/25 0730)  Resp: 19 (01/28/25 0730)  BP: (!) 144/72 (01/28/25 0730)  SpO2: 99 % (01/28/25 0730) Vital Signs (24h Range):  Temp:  [97.5 °F (36.4 °C)-99 °F (37.2 °C)] 97.5 °F (36.4 °C)  Pulse:  [] 88  Resp:  [16-26] 19  SpO2:  [99 %-100 %] 99 %  BP: (133-164)/(72-91) 144/72     Weight: 81.6 kg (180 lb)  Body mass index is 30.9 kg/m².     Physical Exam  Vitals and nursing note reviewed.   Constitutional:       General: He is not in acute distress.     Appearance: He is well-developed. He is ill-appearing. He is not diaphoretic.   HENT:      Head: Normocephalic and atraumatic.      Mouth/Throat:      Mouth: Mucous membranes are moist.   Eyes:      General: No scleral icterus.     Pupils: Pupils are equal, round, and reactive to light.   Neck:      Thyroid: No thyromegaly.   Cardiovascular:      Rate and Rhythm: Normal rate and regular rhythm.      Heart sounds: No murmur heard.  Pulmonary:      Effort: Pulmonary effort is normal.      Breath sounds: Normal breath sounds. No stridor. No wheezing or rales.   Abdominal:      General: There is no distension.      Palpations: Abdomen is soft.      Tenderness: There is no guarding.   Musculoskeletal:         General: Swelling and tenderness present. No deformity. Normal range of motion.      Cervical back: Normal range of motion.   Skin:     General: Skin is warm.      Capillary Refill: Capillary refill takes less than 2 seconds.      Findings: Erythema and lesion present.   Neurological:      Mental Status: He is alert and oriented to person, place, and time. Mental status is at baseline.      Cranial Nerves: No cranial nerve deficit.      Motor: No weakness.      Gait: Gait abnormal.   Psychiatric:         Mood and  Affect: Mood normal.         Behavior: Behavior normal.              CRANIAL NERVES     CN III, IV, VI   Pupils are equal, round, and reactive to light.           Recent Results (from the past 24 hours)   POCT glucose    Collection Time: 01/28/25  1:39 AM   Result Value Ref Range    POCT Glucose 213 (H) 70 - 110 mg/dL   CBC auto differential    Collection Time: 01/28/25  5:23 AM   Result Value Ref Range    WBC 13.60 (H) 3.90 - 12.70 K/uL    RBC 4.37 (L) 4.60 - 6.20 M/uL    Hemoglobin 12.5 (L) 14.0 - 18.0 g/dL    Hematocrit 38.1 (L) 40.0 - 54.0 %    MCV 87 82 - 98 fL    MCH 28.6 27.0 - 31.0 pg    MCHC 32.8 32.0 - 36.0 g/dL    RDW 12.3 11.5 - 14.5 %    Platelets 452 (H) 150 - 450 K/uL    MPV 9.3 9.2 - 12.9 fL    Immature Granulocytes 0.4 0.0 - 0.5 %    Gran # (ANC) 9.8 (H) 1.8 - 7.7 K/uL    Immature Grans (Abs) 0.05 (H) 0.00 - 0.04 K/uL    Lymph # 2.5 1.0 - 4.8 K/uL    Mono # 1.1 (H) 0.3 - 1.0 K/uL    Eos # 0.2 0.0 - 0.5 K/uL    Baso # 0.04 0.00 - 0.20 K/uL    nRBC 0 0 /100 WBC    Gran % 71.7 38.0 - 73.0 %    Lymph % 18.1 18.0 - 48.0 %    Mono % 8.2 4.0 - 15.0 %    Eosinophil % 1.3 0.0 - 8.0 %    Basophil % 0.3 0.0 - 1.9 %    Differential Method Automated    Comprehensive metabolic panel    Collection Time: 01/28/25  5:23 AM   Result Value Ref Range    Sodium 139 136 - 145 mmol/L    Potassium 4.4 3.5 - 5.1 mmol/L    Chloride 103 95 - 110 mmol/L    CO2 20 (L) 23 - 29 mmol/L    Glucose 224 (H) 70 - 110 mg/dL    BUN 46 (H) 6 - 20 mg/dL    Creatinine 2.9 (H) 0.5 - 1.4 mg/dL    Calcium 10.0 8.7 - 10.5 mg/dL    Total Protein 8.9 (H) 6.0 - 8.4 g/dL    Albumin 3.4 (L) 3.5 - 5.2 g/dL    Total Bilirubin 0.3 0.1 - 1.0 mg/dL    Alkaline Phosphatase 120 40 - 150 U/L    AST 23 10 - 40 U/L    ALT 34 10 - 44 U/L    eGFR 27 (A) >60 mL/min/1.73 m^2    Anion Gap 16 8 - 16 mmol/L   Magnesium    Collection Time: 01/28/25  5:23 AM   Result Value Ref Range    Magnesium 2.3 1.6 - 2.6 mg/dL   Procalcitonin    Collection Time: 01/28/25  5:23  AM   Result Value Ref Range    Procalcitonin 0.15 <0.25 ng/mL   Sedimentation rate    Collection Time: 01/28/25  5:23 AM   Result Value Ref Range    Sed Rate >120 (H) 0 - 23 mm/Hr   Beta - Hydroxybutyrate, Serum    Collection Time: 01/28/25  5:23 AM   Result Value Ref Range    Beta-Hydroxybutyrate 0.2 0.0 - 0.5 mmol/L   Rapid HIV    Collection Time: 01/28/25  5:23 AM   Result Value Ref Range    HIV Rapid Testing Non-Reactive Negative   ISTAT Lactate    Collection Time: 01/28/25  5:33 AM   Result Value Ref Range    POC Lactate 1.44 0.5 - 2.2 mmol/L    Sample VENOUS     Site [facility]     Allens Test N/A     DelSys Nasal Can    POCT glucose    Collection Time: 01/28/25  8:00 AM   Result Value Ref Range    POCT Glucose 197 (H) 70 - 110 mg/dL   Urinalysis, Reflex to Urine Culture Urine, Clean Catch    Collection Time: 01/28/25  8:11 AM    Specimen: Urine   Result Value Ref Range    Specimen UA Urine, Clean Catch     Color, UA Yellow Yellow, Straw, Janice    Appearance, UA Clear Clear    pH, UA 7.0 5.0 - 8.0    Specific Gravity, UA 1.020 1.005 - 1.030    Protein, UA 2+ (A) Negative    Glucose, UA 4+ (A) Negative    Ketones, UA Negative Negative    Bilirubin (UA) Negative Negative    Occult Blood UA Negative Negative    Nitrite, UA Negative Negative    Urobilinogen, UA Negative <2.0 EU/dL    Leukocytes, UA Negative Negative   Urinalysis Microscopic    Collection Time: 01/28/25  8:11 AM   Result Value Ref Range    RBC, UA 0 0 - 4 /hpf    WBC, UA 0 0 - 5 /hpf    Bacteria None None-Occ /hpf    Yeast, UA None None    Hyaline Casts, UA 0 0-1/lpf /lpf    Microscopic Comment SEE COMMENT        Microbiology Results (last 7 days)       Procedure Component Value Units Date/Time    Culture, Anaerobic [[id number]] Collected: 01/28/25 0938    Order Status: Sent Specimen: Wound from Toe, Left Foot Updated: 01/28/25 0943    Aerobic culture [[id number]] Collected: 01/28/25 0938    Order Status: Sent Specimen: Wound from Toe, Left Foot  Updated: 01/28/25 0943    Gram stain [[id number]] Collected: 01/28/25 0938    Order Status: Sent Specimen: Wound from Toe, Left Foot Updated: 01/28/25 0943    Blood culture x two cultures. Draw prior to antibiotics. [[id number]] Collected: 01/28/25 0524    Order Status: Sent Specimen: Blood from Peripheral, Antecubital, Left Updated: 01/28/25 0533    Blood culture x two cultures. Draw prior to antibiotics. [[id number]] Collected: 01/28/25 0524    Order Status: Sent Specimen: Blood from Peripheral, Antecubital, Left Updated: 01/28/25 0533             Imaging Results               X-Ray Foot Complete Left (Final result)  Result time 01/28/25 06:02:03      Final result by Danie Bartholomew MD (01/28/25 06:02:03)                   Impression:      Soft tissue swelling of the 4th toe left foot with some air in the soft tissues of the webspace between the 3rd and 4th toes suggesting soft tissue infection.  There is some adjacent bone destruction involving the distal aspect of the proximal phalanx of the 4th toe suggesting osteomyelitis.  Further evaluation can be obtained with MRI.    This report was flagged in Epic as abnormal.      Electronically signed by: Danie Bartholomew MD  Date:    01/28/2025  Time:    06:02               Narrative:    EXAMINATION:  XR FOOT COMPLETE 3 VIEW LEFT    CLINICAL HISTORY:  .  Type 2 diabetes mellitus with other skin complications    TECHNIQUE:  AP, lateral and oblique views of the left foot were performed.    COMPARISON:  None    FINDINGS:  No fracture or dislocation.  Lisfranc articulation is congruent.  Cartilage spaces are maintained.  Vascular calcifications present.  Soft tissue swelling of the 4th toe left foot with some air in the soft tissues of the webspace between the 3rd and 4th toes suggesting soft tissue infection.  There is some adjacent bone destruction involving the distal aspect of the proximal phalanx of the 4th toe suggesting osteomyelitis.  No radiopaque foreign  body.                                        Assessment/Plan:     * Osteomyelitis of left foot  Apparent infection of left forefoot/toe on exam  XRAY showing:  Soft tissue swelling of the 4th toe left foot  Air in the soft tissues of the webspace between the 3rd and 4th toes suggesting soft tissue infection  Adjacent bone destruction involving the distal aspect of the proximal phalanx of the 4th toe suggesting osteomyelitis  On Vanc and cefepime, poor renal fxn  Podiatry consulted    Acute kidney injury superimposed on stage 2 chronic kidney disease  Creatine 2.9 on admit  IV fluids, recheck   Estimated Creatinine Clearance: 31.7 mL/min (A) (based on SCr of 2.9 mg/dL (H)).  Monitor UOP and serial BMP and adjust therapy as needed.   Renally dose meds.   Avoid nephrotoxic medications and procedures.  BL Cr 1.4 on most recent, 2022    Chronic kidney disease, stage 2, mildly decreased GFR  See MYLA note    Type 2 diabetes mellitus with hyperglycemia, with long-term current use of insulin  Patient states that he takes 10U insulin in AM and 40U in PM but is unclear what type  DA diet, accuchecks, hypoglycemic protocol  SSI only while NPO  start basal bolus if glucoses consistently >180 and eating      Essential hypertension  Goal -180 with hospitalized infection   Hold Home bp meds  PRNs available      VTE Risk Mitigation (From admission, onward)           Ordered     IP VTE LOW RISK PATIENT  Once         01/28/25 0628                               Pharmacokinetic Initial Assessment: IV Vancomycin    Assessment/Plan:    Initiate intravenous vancomycin with loading dose of 1750 mg once with subsequent doses when random concentrations are less than 20 mcg/mL  Desired empiric serum trough concentration is 10 to 20 mcg/mL  Draw vancomycin random level on 1/28/25 at 1830.  Pharmacy will continue to follow and monitor vancomycin.      Please contact pharmacy at extension [phone] with any questions regarding this  "assessment.     Thank you for the consult,   Anjelica Rosales       Patient brief summary:  Robel Desai is a 44 y.o. male initiated on antimicrobial therapy with IV Vancomycin for treatment of suspected bone/joint infection    Drug Allergies:   Review of patient's allergies indicates:  No Known Allergies    Actual Body Weight:   81.6 kg    Renal Function:   Estimated Creatinine Clearance: 31.7 mL/min (A) (based on SCr of 2.9 mg/dL (H)).,     Dialysis Method (if applicable):  N/A    CBC (last 72 hours):  Recent Labs   Lab Result Units 01/28/25  0523   WBC K/uL 13.60*   Hemoglobin g/dL 12.5*   Hematocrit % 38.1*   Platelets K/uL 452*   Gran % % 71.7   Lymph % % 18.1   Mono % % 8.2   Eosinophil % % 1.3   Basophil % % 0.3   Differential Method  Automated       Metabolic Panel (last 72 hours):  Recent Labs   Lab Result Units 01/28/25  0523   Sodium mmol/L 139   Potassium mmol/L 4.4   Chloride mmol/L 103   CO2 mmol/L 20*   Glucose mg/dL 224*   BUN mg/dL 46*   Creatinine mg/dL 2.9*   Albumin g/dL 3.4*   Total Bilirubin mg/dL 0.3   Alkaline Phosphatase U/L 120   AST U/L 23   ALT U/L 34   Magnesium mg/dL 2.3       Drug levels (last 3 results):  No results for input(s): "VANCOMYCINRA", "VANCORANDOM", "VANCOMYCINPE", "VANCOPEAK", "VANCOMYCINTR", "VANCOTROUGH" in the last 72 hours.    Microbiologic Results:  Microbiology Results (last 7 days)       Procedure Component Value Units Date/Time    Gram stain [[id number]]     Order Status: No result Specimen: Wound     Aerobic culture [[id number]]     Order Status: No result Specimen: Wound     Culture, Anaerobic [[id number]]     Order Status: No result Specimen: Wound     Blood culture x two cultures. Draw prior to antibiotics. [[id number]] Collected: 01/28/25 0524    Order Status: Sent Specimen: Blood from Peripheral, Antecubital, Left Updated: 01/28/25 0533    Blood culture x two cultures. Draw prior to antibiotics. [[id number]] Collected: 01/28/25 0524    Order Status: Sent Specimen: " Blood from Peripheral, Antecubital, Left Updated: 01/28/25 0533              Jay Palacios MD  Department of Hospital Medicine  [facility] - [place] - Emergency Dept

## 2025-01-29 LAB
ALBUMIN SERPL BCP-MCNC: 2.6 G/DL (ref 3.5–5.2)
ALP SERPL-CCNC: 96 U/L (ref 40–150)
ALT SERPL W/O P-5'-P-CCNC: 28 U/L (ref 10–44)
ANION GAP SERPL CALC-SCNC: 10 MMOL/L (ref 8–16)
AST SERPL-CCNC: 17 U/L (ref 10–40)
BASOPHILS # BLD AUTO: 0.03 K/UL (ref 0–0.2)
BASOPHILS NFR BLD: 0.3 % (ref 0–1.9)
BILIRUB SERPL-MCNC: 0.3 MG/DL (ref 0.1–1)
BUN SERPL-MCNC: 35 MG/DL (ref 6–20)
CALCIUM SERPL-MCNC: 9 MG/DL (ref 8.7–10.5)
CHLORIDE SERPL-SCNC: 107 MMOL/L (ref 95–110)
CO2 SERPL-SCNC: 21 MMOL/L (ref 23–29)
CREAT SERPL-MCNC: 2.4 MG/DL (ref 0.5–1.4)
DIFFERENTIAL METHOD BLD: ABNORMAL
EOSINOPHIL # BLD AUTO: 0.2 K/UL (ref 0–0.5)
EOSINOPHIL NFR BLD: 1.4 % (ref 0–8)
ERYTHROCYTE [DISTWIDTH] IN BLOOD BY AUTOMATED COUNT: 12.3 % (ref 11.5–14.5)
EST. GFR  (NO RACE VARIABLE): 33 ML/MIN/1.73 M^2
GLUCOSE SERPL-MCNC: 176 MG/DL (ref 70–110)
GRAM STN SPEC: NORMAL
HCT VFR BLD AUTO: 34 % (ref 40–54)
HGB BLD-MCNC: 10.6 G/DL (ref 14–18)
IMM GRANULOCYTES # BLD AUTO: 0.03 K/UL (ref 0–0.04)
IMM GRANULOCYTES NFR BLD AUTO: 0.3 % (ref 0–0.5)
LYMPHOCYTES # BLD AUTO: 1.5 K/UL (ref 1–4.8)
LYMPHOCYTES NFR BLD: 13.3 % (ref 18–48)
MAGNESIUM SERPL-MCNC: 2 MG/DL (ref 1.6–2.6)
MCH RBC QN AUTO: 27.7 PG (ref 27–31)
MCHC RBC AUTO-ENTMCNC: 31.2 G/DL (ref 32–36)
MCV RBC AUTO: 89 FL (ref 82–98)
MONOCYTES # BLD AUTO: 0.9 K/UL (ref 0.3–1)
MONOCYTES NFR BLD: 7.9 % (ref 4–15)
NEUTROPHILS # BLD AUTO: 8.9 K/UL (ref 1.8–7.7)
NEUTROPHILS NFR BLD: 76.8 % (ref 38–73)
NRBC BLD-RTO: 0 /100 WBC
PHOSPHATE SERPL-MCNC: 3.5 MG/DL (ref 2.7–4.5)
PLATELET # BLD AUTO: 355 K/UL (ref 150–450)
PMV BLD AUTO: 9.4 FL (ref 9.2–12.9)
POCT GLUCOSE: 162 MG/DL (ref 70–110)
POCT GLUCOSE: 170 MG/DL (ref 70–110)
POCT GLUCOSE: 183 MG/DL (ref 70–110)
POCT GLUCOSE: 198 MG/DL (ref 70–110)
POTASSIUM SERPL-SCNC: 5.5 MMOL/L (ref 3.5–5.1)
PROT SERPL-MCNC: 7 G/DL (ref 6–8.4)
RBC # BLD AUTO: 3.83 M/UL (ref 4.6–6.2)
SODIUM SERPL-SCNC: 138 MMOL/L (ref 136–145)
VANCOMYCIN SERPL-MCNC: 11.3 UG/ML
WBC # BLD AUTO: 11.51 K/UL (ref 3.9–12.7)

## 2025-01-29 PROCEDURE — 80053 COMPREHEN METABOLIC PANEL: CPT | Performed by: PODIATRIST

## 2025-01-29 PROCEDURE — 63600175 PHARM REV CODE 636 W HCPCS: Performed by: PODIATRIST

## 2025-01-29 PROCEDURE — 84100 ASSAY OF PHOSPHORUS: CPT | Performed by: PODIATRIST

## 2025-01-29 PROCEDURE — 25000003 PHARM REV CODE 250: Performed by: STUDENT IN AN ORGANIZED HEALTH CARE EDUCATION/TRAINING PROGRAM

## 2025-01-29 PROCEDURE — 25000003 PHARM REV CODE 250: Performed by: PODIATRIST

## 2025-01-29 PROCEDURE — 99900035 HC TECH TIME PER 15 MIN (STAT)

## 2025-01-29 PROCEDURE — 87206 SMEAR FLUORESCENT/ACID STAI: CPT | Performed by: PODIATRIST

## 2025-01-29 PROCEDURE — 36415 COLL VENOUS BLD VENIPUNCTURE: CPT | Performed by: PODIATRIST

## 2025-01-29 PROCEDURE — 21400001 HC TELEMETRY ROOM

## 2025-01-29 PROCEDURE — 63600175 PHARM REV CODE 636 W HCPCS: Mod: JZ,TB | Performed by: STUDENT IN AN ORGANIZED HEALTH CARE EDUCATION/TRAINING PROGRAM

## 2025-01-29 PROCEDURE — 83735 ASSAY OF MAGNESIUM: CPT | Performed by: PODIATRIST

## 2025-01-29 PROCEDURE — 25000003 PHARM REV CODE 250: Performed by: INTERNAL MEDICINE

## 2025-01-29 PROCEDURE — 80202 ASSAY OF VANCOMYCIN: CPT | Performed by: STUDENT IN AN ORGANIZED HEALTH CARE EDUCATION/TRAINING PROGRAM

## 2025-01-29 PROCEDURE — 87116 MYCOBACTERIA CULTURE: CPT | Performed by: PODIATRIST

## 2025-01-29 PROCEDURE — 85025 COMPLETE CBC W/AUTO DIFF WBC: CPT | Performed by: PODIATRIST

## 2025-01-29 RX ORDER — CETIRIZINE HYDROCHLORIDE 10 MG/1
10 TABLET ORAL ONCE
Status: COMPLETED | OUTPATIENT
Start: 2025-01-29 | End: 2025-01-29

## 2025-01-29 RX ORDER — HYDRALAZINE HYDROCHLORIDE 25 MG/1
50 TABLET, FILM COATED ORAL EVERY 12 HOURS
Status: DISCONTINUED | OUTPATIENT
Start: 2025-01-29 | End: 2025-02-03 | Stop reason: HOSPADM

## 2025-01-29 RX ORDER — DIPHENHYDRAMINE HCL 25 MG
25 CAPSULE ORAL EVERY 6 HOURS PRN
Status: DISCONTINUED | OUTPATIENT
Start: 2025-01-29 | End: 2025-02-03 | Stop reason: HOSPADM

## 2025-01-29 RX ORDER — AMLODIPINE BESYLATE 5 MG/1
10 TABLET ORAL DAILY
Status: DISCONTINUED | OUTPATIENT
Start: 2025-01-29 | End: 2025-02-03 | Stop reason: HOSPADM

## 2025-01-29 RX ORDER — DIPHENHYDRAMINE HCL 25 MG
25 CAPSULE ORAL ONCE
Status: COMPLETED | OUTPATIENT
Start: 2025-01-29 | End: 2025-01-29

## 2025-01-29 RX ORDER — HYDRALAZINE HYDROCHLORIDE 20 MG/ML
30 INJECTION INTRAMUSCULAR; INTRAVENOUS EVERY 4 HOURS PRN
Status: DISCONTINUED | OUTPATIENT
Start: 2025-01-29 | End: 2025-02-03 | Stop reason: HOSPADM

## 2025-01-29 RX ORDER — HYDRALAZINE HYDROCHLORIDE 20 MG/ML
20 INJECTION INTRAMUSCULAR; INTRAVENOUS EVERY 4 HOURS PRN
Status: DISCONTINUED | OUTPATIENT
Start: 2025-01-29 | End: 2025-02-03 | Stop reason: HOSPADM

## 2025-01-29 RX ADMIN — CETIRIZINE HYDROCHLORIDE 10 MG: 10 TABLET, FILM COATED ORAL at 02:01

## 2025-01-29 RX ADMIN — ASPIRIN 81 MG: 81 TABLET, COATED ORAL at 09:01

## 2025-01-29 RX ADMIN — DIPHENHYDRAMINE HYDROCHLORIDE 25 MG: 25 CAPSULE ORAL at 02:01

## 2025-01-29 RX ADMIN — HYDROMORPHONE HYDROCHLORIDE 1 MG: 1 INJECTION, SOLUTION INTRAMUSCULAR; INTRAVENOUS; SUBCUTANEOUS at 12:01

## 2025-01-29 RX ADMIN — HYDRALAZINE HYDROCHLORIDE 50 MG: 25 TABLET ORAL at 09:01

## 2025-01-29 RX ADMIN — CEFEPIME 2 G: 2 INJECTION, POWDER, FOR SOLUTION INTRAVENOUS at 04:01

## 2025-01-29 RX ADMIN — VANCOMYCIN HYDROCHLORIDE 750 MG: 750 INJECTION, POWDER, LYOPHILIZED, FOR SOLUTION INTRAVENOUS at 09:01

## 2025-01-29 RX ADMIN — DIPHENHYDRAMINE HYDROCHLORIDE 25 MG: 25 CAPSULE ORAL at 12:01

## 2025-01-29 RX ADMIN — HYDROMORPHONE HYDROCHLORIDE 1 MG: 1 INJECTION, SOLUTION INTRAMUSCULAR; INTRAVENOUS; SUBCUTANEOUS at 09:01

## 2025-01-29 RX ADMIN — CEFEPIME 2 G: 2 INJECTION, POWDER, FOR SOLUTION INTRAVENOUS at 05:01

## 2025-01-29 RX ADMIN — HYDROMORPHONE HYDROCHLORIDE 1 MG: 1 INJECTION, SOLUTION INTRAMUSCULAR; INTRAVENOUS; SUBCUTANEOUS at 01:01

## 2025-01-29 RX ADMIN — AMLODIPINE BESYLATE 10 MG: 5 TABLET ORAL at 05:01

## 2025-01-29 RX ADMIN — Medication 6 MG: at 08:01

## 2025-01-29 RX ADMIN — HYDRALAZINE HYDROCHLORIDE 50 MG: 25 TABLET ORAL at 08:01

## 2025-01-29 RX ADMIN — HYDROMORPHONE HYDROCHLORIDE 1 MG: 1 INJECTION, SOLUTION INTRAMUSCULAR; INTRAVENOUS; SUBCUTANEOUS at 05:01

## 2025-01-29 NOTE — PROGRESS NOTES
Pharmacokinetic Assessment Follow Up: IV Vancomycin    Vancomycin serum concentration assessment(s):    The random level was drawn correctly and can be used to guide therapy at this time. The measurement is within the desired definitive target range of 10 to 20 mcg/mL.    Vancomycin Regimen Plan:    Re-dose when the random level is less than 20 mcg/mL, next level to be drawn at 04:00 on 1/29    Drug levels (last 3 results):  Recent Labs   Lab Result Units 01/28/25  1821   Vancomycin, Random ug/mL 16.8       Pharmacy will continue to follow and monitor vancomycin.    Please contact pharmacy at extension [phone] for questions regarding this assessment.    Thank you for the consult,   Jacki Hdz       Patient brief summary:  Robel Desai is a 44 y.o. male initiated on antimicrobial therapy with IV Vancomycin for treatment of skin & soft tissue infection    Drug Allergies:   Review of patient's allergies indicates:  No Known Allergies    Actual Body Weight:   81.6 kg    Renal Function:   Estimated Creatinine Clearance: 31.7 mL/min (A) (based on SCr of 2.9 mg/dL (H)).,     Dialysis Method (if applicable):  N/A    CBC (last 72 hours):  Recent Labs   Lab Result Units 01/28/25  0523   WBC K/uL 13.60*   Hemoglobin g/dL 12.5*   Hemoglobin A1C % 6.5*   Hematocrit % 38.1*   Platelets K/uL 452*   Gran % % 71.7   Lymph % % 18.1   Mono % % 8.2   Eosinophil % % 1.3   Basophil % % 0.3   Differential Method  Automated       Metabolic Panel (last 72 hours):  Recent Labs   Lab Result Units 01/28/25  0523 01/28/25  0811   Sodium mmol/L 139  --    Potassium mmol/L 4.4  --    Chloride mmol/L 103  --    CO2 mmol/L 20*  --    Glucose mg/dL 224*  --    Glucose, UA   --  4+*   BUN mg/dL 46*  --    Creatinine mg/dL 2.9*  --    Albumin g/dL 3.4*  --    Total Bilirubin mg/dL 0.3  --    Alkaline Phosphatase U/L 120  --    AST U/L 23  --    ALT U/L 34  --    Magnesium mg/dL 2.3  --        Vancomycin Administrations:  vancomycin given in the  last 96 hours                     vancomycin (VANCOCIN) 1,750 mg in 0.9% NaCl 500 mL IVPB (mg) 1,750 mg New Bag 01/28/25 0621                    Microbiologic Results:  Microbiology Results (last 7 days)       Procedure Component Value Units Date/Time    Fungus culture [[id number]] Collected: 01/28/25 1720    Order Status: Sent Specimen: Incision site from Toe, Left Foot Updated: 01/28/25 1857    AFB Culture & Smear [[id number]] Collected: 01/28/25 1720    Order Status: Sent Specimen: Incision site from Toe, Left Foot Updated: 01/28/25 1855    Culture, Anaerobe [[id number]] Collected: 01/28/25 1720    Order Status: Sent Specimen: Incision site from Toe, Left Foot Updated: 01/28/25 1848    Aerobic culture [[id number]] Collected: 01/28/25 1720    Order Status: Sent Specimen: Incision site from Toe, Left Foot Updated: 01/28/25 1848    Fungus culture [[id number]] Collected: 01/28/25 1720    Order Status: Sent Specimen: Incision site from Toe, Left Foot Updated: 01/28/25 1848    Gram stain [[id number]] Collected: 01/28/25 1720    Order Status: Sent Specimen: Incision site from Toe, Left Foot Updated: 01/28/25 1848    AFB Culture & Smear [[id number]] Collected: 01/28/25 1720    Order Status: Sent Specimen: Incision site from Toe, Left Foot Updated: 01/28/25 1843    Culture, Anaerobe [[id number]] Collected: 01/28/25 1720    Order Status: Sent Specimen: Incision site from Toe, Left Foot Updated: 01/28/25 1807    Aerobic culture [[id number]] Collected: 01/28/25 1720    Order Status: Sent Specimen: Incision site from Toe, Left Foot Updated: 01/28/25 1807    Gram stain [[id number]] Collected: 01/28/25 1720    Order Status: Sent Specimen: Incision site from Toe, Left Foot Updated: 01/28/25 1807    Blood culture x two cultures. Draw prior to antibiotics. [[id number]] Collected: 01/28/25 0524    Order Status: Completed Specimen: Blood from Peripheral, Antecubital, Left Updated: 01/28/25 1312     Blood Culture, Routine No Growth  to date    Narrative:      Aerobic and anaerobic    Blood culture x two cultures. Draw prior to antibiotics. [[id number]] Collected: 01/28/25 0524    Order Status: Completed Specimen: Blood from Peripheral, Antecubital, Left Updated: 01/28/25 1312     Blood Culture, Routine No Growth to date    Narrative:      Aerobic and anaerobic    Gram stain [[id number]] Collected: 01/28/25 0938    Order Status: Completed Specimen: Wound from Toe, Left Foot Updated: 01/28/25 1130     Gram Stain Result Rare WBC's      Rare Gram positive cocci in pairs and chains    Culture, Anaerobic [[id number]] Collected: 01/28/25 0938    Order Status: Sent Specimen: Wound from Toe, Left Foot Updated: 01/28/25 0943    Aerobic culture [[id number]] Collected: 01/28/25 0938    Order Status: Sent Specimen: Wound from Toe, Left Foot Updated: 01/28/25 0943

## 2025-01-29 NOTE — PROGRESS NOTES
[facility] - Emergency Dept  Podiatry  Consult Note    Patient Name: Robel Desai  MRN: [id number]  Admission Date: 1/28/2025  Hospital Length of Stay: 1 days  Attending Physician: Jay Palacios MD  Primary Care Provider: Ashish Greer [facility] -     Consults  Subjective:     History of Present Illness: Robel Desai is a 44 y.o. male with  has a past medical history of Chronic kidney disease, stage 2, mildly decreased GFR, Diabetes mellitus, and Hypertension. Consulted to the podiatry for evaluation and treatment of  left foot infection   Location: lateral forefoot/toes Onset of the symptoms was several days ago. Precipitating event:  Patient relates that he had had some increased edema to his legs and feet and he wore a tight shoe which he believes resulted in irritation and infection of the toe .  History of injury: no Current symptoms include: swelling and pain . Signs of infection fever, chills, and fatigue   Symptoms have gradually worsened.     The entirety of our encounter and consent was done with the assistance of phone  Charu #[id number]    1/29/25: Patient seen bedside. Denies pedal pain. Reports itching all over body. Patient reports similar issue in the past after anesthesia.     Shoe gear: Slip-on shoes    Chief Complaint   Patient presents with    Abscess     To top of left foot X 3-4 days, no drainage noted   Per H&P On presentation WBC# 14k and ESR >120. XRAY shows soft tissue swelling of the 4th toe left foot with air in the soft tissues of the webspace between the 3rd and 4th toes suggesting soft tissue infection, as well as adjacent bone destruction involving the distal aspect of the proximal phalanx of the 4th toe suggesting osteomyelitis. Given V+Z in ED. Switched to V+Cefepime for now        Scheduled Meds:   amLODIPine  10 mg Oral Daily    aspirin  81 mg Oral Daily    ceFEPime IV (PEDS and ADULTS)  2 g Intravenous Q12H    hydrALAZINE  50 mg Oral Q12H    melatonin  6 mg Oral  Nightly     Continuous Infusions:      PRN Meds:  Current Facility-Administered Medications:     acetaminophen, 650 mg, Oral, Q4H PRN    albuterol-ipratropium, 3 mL, Nebulization, Q6H PRN    dextrose 50%, 12.5 g, Intravenous, PRN    dextrose 50%, 12.5 g, Intravenous, PRN    dextrose 50%, 25 g, Intravenous, PRN    diphenhydrAMINE, 25 mg, Oral, Q6H PRN    glucagon (human recombinant), 1 mg, Intramuscular, PRN    glucagon (human recombinant), 1 mg, Intramuscular, PRN    glucose, 16 g, Oral, PRN    glucose, 24 g, Oral, PRN    hydrALAZINE, 20 mg, Intravenous, Q4H PRN    hydrALAZINE, 30 mg, Intravenous, Q4H PRN    HYDROmorphone, 1 mg, Intravenous, Q4H PRN    insulin aspart U-100, 0-5 Units, Subcutaneous, Q6H PRN    magnesium oxide, 800 mg, Oral, PRN    magnesium oxide, 800 mg, Oral, PRN    naloxone, 0.02 mg, Intravenous, PRN    ondansetron, 8 mg, Oral, Q8H PRN    polyethylene glycol, 17 g, Oral, BID PRN    potassium bicarbonate, 35 mEq, Oral, PRN    potassium bicarbonate, 50 mEq, Oral, PRN    potassium bicarbonate, 60 mEq, Oral, PRN    potassium, sodium phosphates, 2 packet, Oral, PRN    potassium, sodium phosphates, 2 packet, Oral, PRN    potassium, sodium phosphates, 2 packet, Oral, PRN    prochlorperazine, 5 mg, Intravenous, Q6H PRN    senna-docusate 8.6-50 mg, 1 tablet, Oral, Daily PRN    simethicone, 1 tablet, Oral, QID PRN    Pharmacy to dose Vancomycin consult, , , Once **AND** vancomycin - pharmacy to dose, , Intravenous, pharmacy to manage frequency    Review of patient's allergies indicates:  No Known Allergies     Past Medical History:   Diagnosis Date    Chronic kidney disease, stage 2, mildly decreased GFR 01/28/2025    Diabetes mellitus     Hypertension      Past Surgical History:   Procedure Laterality Date    APPENDECTOMY      TOE AMPUTATION Left 1/28/2025    Procedure: AMPUTATION, TOE 4th TOE;  Surgeon: Ivis Wilson DPM;  Location: [facility];  Service: Podiatry;  Laterality: Left;       Family History        Problem Relation (Age of Onset)    Diabetes Mother          Tobacco Use    Smoking status: Never     Passive exposure: Never    Smokeless tobacco: Never   Substance and Sexual Activity    Alcohol use: Yes     Comment: occasionally     Drug use: No    Sexual activity: Not on file     Review of Systems   Constitutional:  Positive for activity change, fatigue and fever. Negative for appetite change and chills.   Respiratory:  Negative for cough and shortness of breath.    Cardiovascular:  Positive for leg swelling. Negative for chest pain.   Gastrointestinal:  Negative for diarrhea, nausea and vomiting.   Musculoskeletal:  Positive for myalgias.   Skin:  Positive for color change and wound.   Neurological:  Negative for weakness.        + paresthesia      Objective:     Vital Signs (Most Recent):  Temp: 98 °F (36.7 °C) (01/29/25 1130)  Pulse: 96 (01/29/25 1456)  Resp: 20 (01/29/25 1210)  BP: 123/68 (01/29/25 1130)  SpO2: 95 % (01/29/25 1130) Vital Signs (24h Range):  Temp:  [97.8 °F (36.6 °C)-98.9 °F (37.2 °C)] 98 °F (36.7 °C)  Pulse:  [] 96  Resp:  [14-20] 20  SpO2:  [95 %-100 %] 95 %  BP: (123-179)/(68-96) 123/68     Weight: 81.6 kg (180 lb)  Body mass index is 30.9 kg/m².    Physical Exam  Vitals and nursing note reviewed.   Constitutional:       General: He is not in acute distress.     Appearance: He is well-developed. He is not toxic-appearing or diaphoretic.      Comments: alert and oriented x 3.    Cardiovascular:      Pulses:           Dorsalis pedis pulses are 2+ on the right side and 2+ on the left side.        Posterior tibial pulses are 2+ on the right side and 2+ on the left side.   Pulmonary:      Effort: No respiratory distress.   Musculoskeletal:         General: No deformity.      Right ankle: No tenderness. No lateral malleolus, medial malleolus, AITF ligament, CF ligament or posterior TF ligament tenderness.      Right Achilles Tendon: No defects. Paula's test negative.      Left  ankle: No tenderness. No lateral malleolus, medial malleolus, AITF ligament, CF ligament or posterior TF ligament tenderness.      Left Achilles Tendon: No defects. Paula's test negative.      Right foot: No tenderness or bony tenderness.      Left foot: Swelling and tenderness (4th ray) present. No bony tenderness.      Comments: Muscle strength is 5/5 in all groups bilaterally.           Feet:      Left foot:      Skin integrity: Ulcer (see below) present.   Lymphadenopathy:      Comments: No lymphatic streaking     Skin:     General: Skin is warm and dry.      Coloration: Skin is not pale.      Findings: No rash.      Nails: There is no clubbing.   Neurological:      Sensory: No sensory deficit.      Motor: No atrophy.      Comments: Light touch present     Psychiatric:         Attention and Perception: He is attentive.         Mood and Affect: Mood is not anxious. Affect is not inappropriate.         Speech: He is communicative. Speech is not slurred.         Behavior: Behavior is not combative.        1/29/25:  Epic unable to load image.   Slight dusky appearance s/p  left 4th toe amputation. No purulent drainage noted.     01/28/2024  Ulcer location: medial 4th digit, left foot  Signs of infection: edema, erythema, pain, malodor, active purulence  Drainage: Sero-Sanguinous and Purulent  Purulence: Yes  Crepitus/fluctuance: Yes  Periwound: Reddened, Macerated  Base: Fibrotic slough  Depth: cartilage  Probe to bone: Yes                Laboratory:  A1C:   Recent Labs   Lab 01/28/25  0523   HGBA1C 6.5*     CBC:   Recent Labs   Lab 01/29/25  0501   WBC 11.51   RBC 3.83*   HGB 10.6*   HCT 34.0*      MCV 89   MCH 27.7   MCHC 31.2*     CMP:   Recent Labs   Lab 01/29/25  0501   *   CALCIUM 9.0   ALBUMIN 2.6*   PROT 7.0      K 5.5*   CO2 21*      BUN 35*   CREATININE 2.4*   ALKPHOS 96   ALT 28   AST 17   BILITOT 0.3     CRP:   Recent Labs   Lab 01/28/25  0523   CRP 46.3*     ESR:   Recent  Labs   Lab 01/28/25  0523   SEDRATE >120*     Microbiology Results (last 7 days)       Procedure Component Value Units Date/Time    AFB Culture & Smear [[id number]] Collected: 01/29/25 1337    Order Status: No result Specimen: Toe Updated: 01/29/25 1339    AFB Culture & Smear [[id number]] Collected: 01/28/25 1720    Order Status: Sent Specimen: Incision site from Toe, Left Foot Updated: 01/29/25 1257    Aerobic culture [[id number]] Collected: 01/28/25 1720    Order Status: Completed Specimen: Incision site from Toe, Left Foot Updated: 01/29/25 0840     Aerobic Bacterial Culture No growth    Narrative:      Proximal margin left 4th toe    Aerobic culture [[id number]] Collected: 01/28/25 1720    Order Status: Completed Specimen: Incision site from Toe, Left Foot Updated: 01/29/25 0839     Aerobic Bacterial Culture Further report to follow    Narrative:      Left 4th toe    Gram stain [[id number]] Collected: 01/28/25 1720    Order Status: Completed Specimen: Incision site from Toe, Left Foot Updated: 01/29/25 0744     Gram Stain Result Rare WBC's      No organisms seen    Narrative:      Proximal margin left 4th toe    Gram stain [[id number]] Collected: 01/28/25 1720    Order Status: Completed Specimen: Incision site from Toe, Left Foot Updated: 01/29/25 0743     Gram Stain Result Few WBC's      Many Gram positive cocci    Narrative:      Left 4th toe    Aerobic culture [[id number]] Collected: 01/28/25 0938    Order Status: Completed Specimen: Wound from Toe, Left Foot Updated: 01/29/25 0727     Aerobic Bacterial Culture Further report to follow    Blood culture x two cultures. Draw prior to antibiotics. [[id number]] Collected: 01/28/25 0524    Order Status: Completed Specimen: Blood from Peripheral, Antecubital, Left Updated: 01/29/25 0703     Blood Culture, Routine No Growth to date      No Growth to date    Narrative:      Aerobic and anaerobic    Blood culture x two cultures. Draw prior to antibiotics. [[id number]]  Collected: 01/28/25 0524    Order Status: Completed Specimen: Blood from Peripheral, Antecubital, Left Updated: 01/29/25 0703     Blood Culture, Routine No Growth to date      No Growth to date    Narrative:      Aerobic and anaerobic    Culture, Anaerobe [[id number]] Collected: 01/28/25 1720    Order Status: Sent Specimen: Incision site from Toe, Left Foot Updated: 01/28/25 2040    Fungus culture [[id number]] Collected: 01/28/25 1720    Order Status: Sent Specimen: Incision site from Toe, Left Foot Updated: 01/28/25 1857    Culture, Anaerobe [[id number]] Collected: 01/28/25 1720    Order Status: Sent Specimen: Incision site from Toe, Left Foot Updated: 01/28/25 1848    Fungus culture [[id number]] Collected: 01/28/25 1720    Order Status: Sent Specimen: Incision site from Toe, Left Foot Updated: 01/28/25 1848    AFB Culture & Smear [[id number]] Collected: 01/28/25 1720    Order Status: Canceled Specimen: Incision site from Toe, Left Foot     Gram stain [[id number]] Collected: 01/28/25 0938    Order Status: Completed Specimen: Wound from Toe, Left Foot Updated: 01/28/25 1130     Gram Stain Result Rare WBC's      Rare Gram positive cocci in pairs and chains    Culture, Anaerobic [[id number]] Collected: 01/28/25 0938    Order Status: Sent Specimen: Wound from Toe, Left Foot Updated: 01/28/25 0943            Diagnostic Results:  Imaging Results              MRI Forefoot WO Contrast LT (Final result)  Result time 01/28/25 13:48:38      Final result by Mina Barron MD (01/28/25 13:48:38)                   Impression:      Findings concerning for acute osteomyelitis of the 4th toe phalanges.  Surrounding soft tissue induration and swelling of the digit with accompanying susceptibility gas focus.  Findings which can be seen in the setting of nearby open wound or gas-forming infection.    Degree of STIR edema involving the forefoot musculature as detailed.  Appearance is nonspecific though could relate to sequela of  infectious or non-infectious myositis.    Subcutaneous edema along the dorsal foot with differential considerations to include noninfectious inflammatory change or cellulitis.      Electronically signed by: Mina Barron  Date:    01/28/2025  Time:    13:48               Narrative:    EXAMINATION:  MRI FOREFOOT WO CONTRAST LT    CLINICAL HISTORY:  Osteomyelitis, foot;Podiatry request for OM penetration/depth;    TECHNIQUE:  Multiplanar, multisequence MR imaging of the left forefoot without the use of intravenous gadolinium IV contrast.    COMPARISON:  Left foot radiograph dated 01/20/2025    FINDINGS:  There is abnormal T1 marrow signal hypointensity throughout the 4th toe proximal and middle phalanges as well as portion of the distal phalanx.  There is corresponding T2 STIR marrow edema throughout the 3rd toe phalanges with surrounding soft tissue induration and swelling of the digit and accompanying susceptibility focus (series 3, image 19).  Additional STIR edema of nearby intrinsic foot musculature extending along the metatarsal shaft level.  Remaining osseous T1 marrow signal of the forefoot appears maintained.  There is modest STIR edema at the 3rd and 4th metatarsal heads, possibly reactive.  Generalized subcutaneous edema tracking extending along the dorsal foot.  No discrete drainable collection.                                        X-Ray Foot Complete Left (Final result)  Result time 01/28/25 06:02:03      Final result by Danie Bartholomew MD (01/28/25 06:02:03)                   Impression:      Soft tissue swelling of the 4th toe left foot with some air in the soft tissues of the webspace between the 3rd and 4th toes suggesting soft tissue infection.  There is some adjacent bone destruction involving the distal aspect of the proximal phalanx of the 4th toe suggesting osteomyelitis.  Further evaluation can be obtained with MRI.    This report was flagged in Epic as abnormal.      Electronically  signed by: Danie Bartholomew MD  Date:    01/28/2025  Time:    06:02               Narrative:    EXAMINATION:  XR FOOT COMPLETE 3 VIEW LEFT    CLINICAL HISTORY:  .  Type 2 diabetes mellitus with other skin complications    TECHNIQUE:  AP, lateral and oblique views of the left foot were performed.    COMPARISON:  None    FINDINGS:  No fracture or dislocation.  Lisfranc articulation is congruent.  Cartilage spaces are maintained.  Vascular calcifications present.  Soft tissue swelling of the 4th toe left foot with some air in the soft tissues of the webspace between the 3rd and 4th toes suggesting soft tissue infection.  There is some adjacent bone destruction involving the distal aspect of the proximal phalanx of the 4th toe suggesting osteomyelitis.  No radiopaque foreign body.                                      Assessment/Plan:     Active Diagnoses:    Diagnosis Date Noted POA    PRINCIPAL PROBLEM:  Osteomyelitis of fourth toe of left foot [M86.9] 01/28/2025 Yes    Chronic kidney disease, stage 2, mildly decreased GFR [N18.2] 01/28/2025 Yes    Acute kidney injury superimposed on stage 2 chronic kidney disease [N17.9, N18.2] 01/28/2025 Yes    Gas gangrene of foot [A48.0] 01/28/2025 Yes    Diabetic foot infection [E11.628, L08.9] 01/28/2025 Yes    Essential hypertension [I10] 05/06/2018 Yes    Type 2 diabetes mellitus with hyperglycemia, with long-term current use of insulin [E11.65, Z79.4] 09/17/2012 Not Applicable      Problems Resolved During this Admission:     Education about the prevention of limb loss.    Discussed wound healing cycle, skin integrity, ways to care for skin.Counseled patient on the effects of biomechanical pressure, edema, and high blood glucose on healing. He verbalizes understanding that it can increase the chances of delayed healing and this prolonged exposure leads to infection or progression of infection which subsequently can result in loss of limb.    Arterial US ordered    PT pulse  lavage ordered      Short-term goals include maintaining good offloading and minimizing bioburden, promoting granulation and epithelialization to healing.  Long-term goals include keeping the wound healed by good offloading and medical management under the direction of internist.       We will continue to follow and work in partnership with treatment team on how to best treat patient concern and diagnosis.      Thank you for your consult.     Joan Cerna DPM  Podiatry  [facility] - Emergency Dept

## 2025-01-29 NOTE — NURSING
Patient arrived to floor via stretcher via transporter from PACU.  Patient transferred to bed via x1 person assist.

## 2025-01-29 NOTE — NURSING
Pt off the unit going to US by wheelchair via transport. IV access in place, saline locked. NAD or pain noted.

## 2025-01-29 NOTE — PLAN OF CARE
Pt resting throughout the night, PRN pain medication given for pain to left foot.  IV infusing fluids as ordered.  Diet tolerated well, pt denies N/V.  Abx given as ordered.  Ice pack to left foot on, boot in place.  BG monitored, WNL.  No acute distress noted, pt free from falls or injury.  Bed in low position, wheels locked, call light in reach for assistance, will continue to monitor.    Problem: Adult Inpatient Plan of Care  Goal: Plan of Care Review  Outcome: Progressing     Problem: Adult Inpatient Plan of Care  Goal: Patient-Specific Goal (Individualized)  Outcome: Progressing     Problem: Adult Inpatient Plan of Care  Goal: Absence of Hospital-Acquired Illness or Injury  Outcome: Progressing     Problem: Wound  Goal: Optimal Coping  Outcome: Progressing     Problem: Wound  Goal: Optimal Functional Ability  Outcome: Progressing     Problem: Wound  Goal: Absence of Infection Signs and Symptoms  Outcome: Progressing     Problem: Acute Kidney Injury/Impairment  Goal: Fluid and Electrolyte Balance  Outcome: Progressing     Problem: Diabetes Comorbidity  Goal: Blood Glucose Level Within Targeted Range  Outcome: Progressing

## 2025-01-29 NOTE — CARE UPDATE
Per nurse, the pt just had a left toe amputation today, reports pain 8/10. I gave him dilaudid 1mg but the IV disconnected at some point and and he said he didn't get any pain relief. Can he get a one time dose?     -dilaudid 1mg iv x 1c

## 2025-01-29 NOTE — CARE UPDATE
Per nurse, pt is complaining of itching to his left foot (in a surgical dressing and boot after surgery of toe amputation 1/28). No meds for itching on his MAR. Can he get something to help with the itch?     -Zyrtec 10mg po x 1  -Benadryl 25mg po x 1

## 2025-01-29 NOTE — ANESTHESIA POSTPROCEDURE EVALUATION
Anesthesia Post Evaluation    Patient: Robel Desai    Procedure(s) Performed: Procedure(s) (LRB):  AMPUTATION, TOE 4th TOE (Left)    Final Anesthesia Type: general      Patient location during evaluation: PACU  Patient participation: Yes- Able to Participate  Level of consciousness: awake and alert  Post-procedure vital signs: reviewed and stable  Pain management: adequate  Airway patency: patent    PONV status at discharge: No PONV  Anesthetic complications: no      Respiratory status: spontaneous ventilation and room air  Hydration status: euvolemic  Follow-up not needed.              Vitals Value Taken Time   /96 01/28/25 1930   Temp 37.2 °C (98.9 °F) 01/28/25 1930   Pulse 100 01/28/25 2041   Resp 18 01/28/25 1954   SpO2 97 % 01/28/25 1930         Event Time   Out of Recovery 18:40:00         Pain/Noe Score: Pain Rating Prior to Med Admin: 8 (1/28/2025  7:54 PM)  Pain Rating Post Med Admin: 7 (1/28/2025  6:45 PM)  Noe Score: 10 (1/28/2025  6:15 PM)

## 2025-01-29 NOTE — PROGRESS NOTES
[facility] Medicine  Progress Note    Patient Name: Robel Desai  MRN: [id number]  Patient Class: IP- Inpatient   Admission Date: 1/28/2025  Length of Stay: 1 days  Attending Physician: Jay Palacios MD  Primary Care Provider: Ashish Greer [facility] -        Subjective     Principal Problem:Osteomyelitis of fourth toe of left foot        HPI:    Robel Desai is a 44 y.o. male who has a past medical history of Diabetes mellitus, CKD2, and Hypertension, presented to the ED with CC of Foot Pain.    Patient has Left foot sweling, erythema and purulent discharge. Noticed it about 4 days ago, and has progressively worsened. On presentation WBC# 14k and ESR >120. XRAY shows soft tissue swelling of the 4th toe left foot with air in the soft tissues of the webspace between the 3rd and 4th toes suggesting soft tissue infection, as well as adjacent bone destruction involving the distal aspect of the proximal phalanx of the 4th toe suggesting osteomyelitis. Given V+Z in ED. Cr 2.9 with ?BL cr 1.4 two years ago. Switched to V+Cefepime for now. Podiatry consulted. Denies CP, AP or SOB.    Overview/Hospital Course:  Patient admit with acute OM with gas formation, taken to surgery, now s/p toe amp. On V+Cefepime currently, awaiting cx and margins.     Interval History:  NAEON.  No new issues.   CC- toe pain  All questions answered and updates on care given.       ROS:  General: Negative for fevers   Cardiac: Negative for chest pain   Pulmonary: Negative for wheezing  GI: Negative for abdominal distention      Vitals:    01/29/25 0456 01/29/25 0509 01/29/25 0704 01/29/25 0710   BP: (!) 179/95   (!) 153/80   BP Location: Right arm      Patient Position: Lying   Lying   Pulse: 91  82 80   Resp: 20 20  18   Temp: 98.5 °F (36.9 °C)   98.1 °F (36.7 °C)   TempSrc: Oral   Oral   SpO2: 96%   96%   Weight:              Body mass index is 30.9 kg/m².      PHYSICAL EXAM:  GENERAL APPEARANCE: alert and  cooperative.     HEAD: NC/AT  CARDIAC: There is no cyanosis or pallor.   LUNGS: No apparent wheezing or stridor.  ABDOMEN: Non-distended. No guarding.  MSK: No joint erythema or tenderness.   EXTREMITIES: No significant new deformity or new joint abnormality.   NEUROLOGICAL: CN II-XII grossly intact.   SKIN: No lesions or eruptions.  PSYCHIATRIC: No tangential speech. No Hyperactive features.        Recent Results (from the past 24 hours)   Gram stain    Collection Time: 01/28/25  9:38 AM    Specimen: Toe, Left Foot; Wound   Result Value Ref Range    Gram Stain Result Rare WBC's     Gram Stain Result Rare Gram positive cocci in pairs and chains    Aerobic culture    Collection Time: 01/28/25  9:38 AM    Specimen: Toe, Left Foot; Wound   Result Value Ref Range    Aerobic Bacterial Culture Further report to follow    POCT glucose    Collection Time: 01/28/25  3:53 PM   Result Value Ref Range    POCT Glucose 104 70 - 110 mg/dL   Aerobic culture    Collection Time: 01/28/25  5:20 PM    Specimen: Toe, Left Foot; Incision site   Result Value Ref Range    Aerobic Bacterial Culture Further report to follow    Gram stain    Collection Time: 01/28/25  5:20 PM    Specimen: Toe, Left Foot; Incision site   Result Value Ref Range    Gram Stain Result Few WBC's     Gram Stain Result Many Gram positive cocci    Aerobic culture    Collection Time: 01/28/25  5:20 PM    Specimen: Toe, Left Foot; Incision site   Result Value Ref Range    Aerobic Bacterial Culture No growth    Gram stain    Collection Time: 01/28/25  5:20 PM    Specimen: Toe, Left Foot; Incision site   Result Value Ref Range    Gram Stain Result Rare WBC's     Gram Stain Result No organisms seen    Vancomycin, random    Collection Time: 01/28/25  6:21 PM   Result Value Ref Range    Vancomycin, Random 16.8 Not established ug/mL   POCT glucose    Collection Time: 01/28/25  7:31 PM   Result Value Ref Range    POCT Glucose 86 70 - 110 mg/dL   Comprehensive Metabolic Panel  (CMP)    Collection Time: 01/29/25  5:01 AM   Result Value Ref Range    Sodium 138 136 - 145 mmol/L    Potassium 5.5 (H) 3.5 - 5.1 mmol/L    Chloride 107 95 - 110 mmol/L    CO2 21 (L) 23 - 29 mmol/L    Glucose 176 (H) 70 - 110 mg/dL    BUN 35 (H) 6 - 20 mg/dL    Creatinine 2.4 (H) 0.5 - 1.4 mg/dL    Calcium 9.0 8.7 - 10.5 mg/dL    Total Protein 7.0 6.0 - 8.4 g/dL    Albumin 2.6 (L) 3.5 - 5.2 g/dL    Total Bilirubin 0.3 0.1 - 1.0 mg/dL    Alkaline Phosphatase 96 40 - 150 U/L    AST 17 10 - 40 U/L    ALT 28 10 - 44 U/L    eGFR 33 (A) >60 mL/min/1.73 m^2    Anion Gap 10 8 - 16 mmol/L   Magnesium    Collection Time: 01/29/25  5:01 AM   Result Value Ref Range    Magnesium 2.0 1.6 - 2.6 mg/dL   Phosphorus    Collection Time: 01/29/25  5:01 AM   Result Value Ref Range    Phosphorus 3.5 2.7 - 4.5 mg/dL   CBC with Automated Differential    Collection Time: 01/29/25  5:01 AM   Result Value Ref Range    WBC 11.51 3.90 - 12.70 K/uL    RBC 3.83 (L) 4.60 - 6.20 M/uL    Hemoglobin 10.6 (L) 14.0 - 18.0 g/dL    Hematocrit 34.0 (L) 40.0 - 54.0 %    MCV 89 82 - 98 fL    MCH 27.7 27.0 - 31.0 pg    MCHC 31.2 (L) 32.0 - 36.0 g/dL    RDW 12.3 11.5 - 14.5 %    Platelets 355 150 - 450 K/uL    MPV 9.4 9.2 - 12.9 fL    Immature Granulocytes 0.3 0.0 - 0.5 %    Gran # (ANC) 8.9 (H) 1.8 - 7.7 K/uL    Immature Grans (Abs) 0.03 0.00 - 0.04 K/uL    Lymph # 1.5 1.0 - 4.8 K/uL    Mono # 0.9 0.3 - 1.0 K/uL    Eos # 0.2 0.0 - 0.5 K/uL    Baso # 0.03 0.00 - 0.20 K/uL    nRBC 0 0 /100 WBC    Gran % 76.8 (H) 38.0 - 73.0 %    Lymph % 13.3 (L) 18.0 - 48.0 %    Mono % 7.9 4.0 - 15.0 %    Eosinophil % 1.4 0.0 - 8.0 %    Basophil % 0.3 0.0 - 1.9 %    Differential Method Automated    Vancomycin, Random    Collection Time: 01/29/25  5:01 AM   Result Value Ref Range    Vancomycin, Random 11.3 Not established ug/mL   POCT glucose    Collection Time: 01/29/25  8:10 AM   Result Value Ref Range    POCT Glucose 162 (H) 70 - 110 mg/dL       Microbiology Results (last 7  days)       Procedure Component Value Units Date/Time    Aerobic culture [[id number]] Collected: 01/28/25 1720    Order Status: Completed Specimen: Incision site from Toe, Left Foot Updated: 01/29/25 0840     Aerobic Bacterial Culture No growth    Narrative:      Proximal margin left 4th toe    Aerobic culture [[id number]] Collected: 01/28/25 1720    Order Status: Completed Specimen: Incision site from Toe, Left Foot Updated: 01/29/25 0839     Aerobic Bacterial Culture Further report to follow    Narrative:      Left 4th toe    Gram stain [[id number]] Collected: 01/28/25 1720    Order Status: Completed Specimen: Incision site from Toe, Left Foot Updated: 01/29/25 0744     Gram Stain Result Rare WBC's      No organisms seen    Narrative:      Proximal margin left 4th toe    Gram stain [[id number]] Collected: 01/28/25 1720    Order Status: Completed Specimen: Incision site from Toe, Left Foot Updated: 01/29/25 0743     Gram Stain Result Few WBC's      Many Gram positive cocci    Narrative:      Left 4th toe    Aerobic culture [[id number]] Collected: 01/28/25 0938    Order Status: Completed Specimen: Wound from Toe, Left Foot Updated: 01/29/25 0727     Aerobic Bacterial Culture Further report to follow    Blood culture x two cultures. Draw prior to antibiotics. [[id number]] Collected: 01/28/25 0524    Order Status: Completed Specimen: Blood from Peripheral, Antecubital, Left Updated: 01/29/25 0703     Blood Culture, Routine No Growth to date      No Growth to date    Narrative:      Aerobic and anaerobic    Blood culture x two cultures. Draw prior to antibiotics. [[id number]] Collected: 01/28/25 0524    Order Status: Completed Specimen: Blood from Peripheral, Antecubital, Left Updated: 01/29/25 0703     Blood Culture, Routine No Growth to date      No Growth to date    Narrative:      Aerobic and anaerobic    Culture, Anaerobe [[id number]] Collected: 01/28/25 1720    Order Status: Sent Specimen: Incision site from Toe,  Left Foot Updated: 01/28/25 2040    Fungus culture [[id number]] Collected: 01/28/25 1720    Order Status: Sent Specimen: Incision site from Toe, Left Foot Updated: 01/28/25 1857    AFB Culture & Smear [[id number]] Collected: 01/28/25 1720    Order Status: Sent Specimen: Incision site from Toe, Left Foot Updated: 01/28/25 1855    Culture, Anaerobe [[id number]] Collected: 01/28/25 1720    Order Status: Sent Specimen: Incision site from Toe, Left Foot Updated: 01/28/25 1848    Fungus culture [[id number]] Collected: 01/28/25 1720    Order Status: Sent Specimen: Incision site from Toe, Left Foot Updated: 01/28/25 1848    AFB Culture & Smear [[id number]] Collected: 01/28/25 1720    Order Status: Sent Specimen: Incision site from Toe, Left Foot Updated: 01/28/25 1843    Gram stain [[id number]] Collected: 01/28/25 0938    Order Status: Completed Specimen: Wound from Toe, Left Foot Updated: 01/28/25 1130     Gram Stain Result Rare WBC's      Rare Gram positive cocci in pairs and chains    Culture, Anaerobic [[id number]] Collected: 01/28/25 0938    Order Status: Sent Specimen: Wound from Toe, Left Foot Updated: 01/28/25 0943             Imaging Results              MRI Forefoot WO Contrast LT (Final result)  Result time 01/28/25 13:48:38      Final result by Mina Barron MD (01/28/25 13:48:38)                   Impression:      Findings concerning for acute osteomyelitis of the 4th toe phalanges.  Surrounding soft tissue induration and swelling of the digit with accompanying susceptibility gas focus.  Findings which can be seen in the setting of nearby open wound or gas-forming infection.    Degree of STIR edema involving the forefoot musculature as detailed.  Appearance is nonspecific though could relate to sequela of infectious or non-infectious myositis.    Subcutaneous edema along the dorsal foot with differential considerations to include noninfectious inflammatory change or cellulitis.      Electronically  signed by: Mina Barron  Date:    01/28/2025  Time:    13:48               Narrative:    EXAMINATION:  MRI FOREFOOT WO CONTRAST LT    CLINICAL HISTORY:  Osteomyelitis, foot;Podiatry request for OM penetration/depth;    TECHNIQUE:  Multiplanar, multisequence MR imaging of the left forefoot without the use of intravenous gadolinium IV contrast.    COMPARISON:  Left foot radiograph dated 01/20/2025    FINDINGS:  There is abnormal T1 marrow signal hypointensity throughout the 4th toe proximal and middle phalanges as well as portion of the distal phalanx.  There is corresponding T2 STIR marrow edema throughout the 3rd toe phalanges with surrounding soft tissue induration and swelling of the digit and accompanying susceptibility focus (series 3, image 19).  Additional STIR edema of nearby intrinsic foot musculature extending along the metatarsal shaft level.  Remaining osseous T1 marrow signal of the forefoot appears maintained.  There is modest STIR edema at the 3rd and 4th metatarsal heads, possibly reactive.  Generalized subcutaneous edema tracking extending along the dorsal foot.  No discrete drainable collection.                                        X-Ray Foot Complete Left (Final result)  Result time 01/28/25 06:02:03      Final result by Danie Bartholomew MD (01/28/25 06:02:03)                   Impression:      Soft tissue swelling of the 4th toe left foot with some air in the soft tissues of the webspace between the 3rd and 4th toes suggesting soft tissue infection.  There is some adjacent bone destruction involving the distal aspect of the proximal phalanx of the 4th toe suggesting osteomyelitis.  Further evaluation can be obtained with MRI.    This report was flagged in Epic as abnormal.      Electronically signed by: Danie Bartholomew MD  Date:    01/28/2025  Time:    06:02               Narrative:    EXAMINATION:  XR FOOT COMPLETE 3 VIEW LEFT    CLINICAL HISTORY:  .  Type 2 diabetes mellitus with other  skin complications    TECHNIQUE:  AP, lateral and oblique views of the left foot were performed.    COMPARISON:  None    FINDINGS:  No fracture or dislocation.  Lisfranc articulation is congruent.  Cartilage spaces are maintained.  Vascular calcifications present.  Soft tissue swelling of the 4th toe left foot with some air in the soft tissues of the webspace between the 3rd and 4th toes suggesting soft tissue infection.  There is some adjacent bone destruction involving the distal aspect of the proximal phalanx of the 4th toe suggesting osteomyelitis.  No radiopaque foreign body.                                             Assessment and Plan     * Osteomyelitis of fourth toe of left foot  Apparent infection of left forefoot/toe on exam  XRAY showing:  Soft tissue swelling of the 4th toe left foot  Air in the soft tissues of the webspace between the 3rd and 4th toes suggesting soft tissue infection  Adjacent bone destruction involving the distal aspect of the proximal phalanx of the 4th toe suggesting osteomyelitis  On Vanc and cefepime, poor renal fxn  Podiatry consulted    Acute kidney injury superimposed on stage 2 chronic kidney disease  Creatine 2.9 on admit  IV fluids, recheck   Estimated Creatinine Clearance: 31.7 mL/min (A) (based on SCr of 2.9 mg/dL (H)).  Monitor UOP and serial BMP and adjust therapy as needed.   Renally dose meds.   Avoid nephrotoxic medications and procedures.  BL Cr 1.4 on most recent, 2022    Chronic kidney disease, stage 2, mildly decreased GFR  See MYLA note    Type 2 diabetes mellitus with hyperglycemia, with long-term current use of insulin  Patient states that he takes 10U insulin in AM and 40U in PM but is unclear what type  DA diet, accuchecks, hypoglycemic protocol  SSI only while NPO  start basal bolus if glucoses consistently >180 and eating      Essential hypertension  Goal -180 with hospitalized infection   Hold Home bp meds  PRNs available      VTE Risk Mitigation  (From admission, onward)           Ordered     IP VTE LOW RISK PATIENT  Once         01/28/25 0628                    Discharge Planning   FLETCHER:      Code Status: Full Code   Medical Readiness for Discharge Date:   Discharge Plan A: Home with family                        Jay Palacios MD  Department of Hospital Medicine   [facility] [place]

## 2025-01-29 NOTE — NURSING
Pt back to unit from US by wheelchair via transport. IV access in place, saline locked. NAD or pain noted.

## 2025-01-29 NOTE — PROGRESS NOTES
Pharmacokinetic Assessment Follow Up: IV Vancomycin    Vancomycin serum concentration assessment(s):    The random level was drawn correctly and can be used to guide therapy at this time. The measurement is within the desired definitive target range of 10 to 20 mcg/mL.    Vancomycin Regimen Plan:    Give 750 mg today.  Re-dose when the random level is less than 20 mcg/mL, next level to be drawn at 0400 on 1/30/2025    Drug levels (last 3 results):  Recent Labs   Lab Result Units 01/28/25  1821 01/29/25  0501   Vancomycin, Random ug/mL 16.8 11.3       Pharmacy will continue to follow and monitor vancomycin.    Please contact pharmacy at extension [phone] for questions regarding this assessment.    Thank you for the consult,   Carlos Arguello Jr       Patient brief summary:  Robel Desai is a 44 y.o. male initiated on antimicrobial therapy with IV Vancomycin for treatment of bone/joint infection    Drug Allergies:   Review of patient's allergies indicates:  No Known Allergies    Actual Body Weight:   81.6 kg    Renal Function:   Estimated Creatinine Clearance: 38.3 mL/min (A) (based on SCr of 2.4 mg/dL (H)).,     Dialysis Method (if applicable):  N/A    CBC (last 72 hours):  Recent Labs   Lab Result Units 01/28/25  0523 01/29/25  0501   WBC K/uL 13.60* 11.51   Hemoglobin g/dL 12.5* 10.6*   Hemoglobin A1C % 6.5*  --    Hematocrit % 38.1* 34.0*   Platelets K/uL 452* 355   Gran % % 71.7 76.8*   Lymph % % 18.1 13.3*   Mono % % 8.2 7.9   Eosinophil % % 1.3 1.4   Basophil % % 0.3 0.3   Differential Method  Automated Automated       Metabolic Panel (last 72 hours):  Recent Labs   Lab Result Units 01/28/25  0523 01/28/25  0811 01/29/25  0501   Sodium mmol/L 139  --  138   Potassium mmol/L 4.4  --  5.5*   Chloride mmol/L 103  --  107   CO2 mmol/L 20*  --  21*   Glucose mg/dL 224*  --  176*   Glucose, UA   --  4+*  --    BUN mg/dL 46*  --  35*   Creatinine mg/dL 2.9*  --  2.4*   Albumin g/dL 3.4*  --  2.6*   Total Bilirubin  mg/dL 0.3  --  0.3   Alkaline Phosphatase U/L 120  --  96   AST U/L 23  --  17   ALT U/L 34  --  28   Magnesium mg/dL 2.3  --  2.0   Phosphorus mg/dL  --   --  3.5       Vancomycin Administrations:  vancomycin given in the last 96 hours                     vancomycin (VANCOCIN) 1,750 mg in 0.9% NaCl 500 mL IVPB (mg) 1,750 mg New Bag 01/28/25 0621                    Microbiologic Results:  Microbiology Results (last 7 days)       Procedure Component Value Units Date/Time    Gram stain [[id number]] Collected: 01/28/25 1720    Order Status: Completed Specimen: Incision site from Toe, Left Foot Updated: 01/29/25 0744     Gram Stain Result Rare WBC's      No organisms seen    Narrative:      Proximal margin left 4th toe    Gram stain [[id number]] Collected: 01/28/25 1720    Order Status: Completed Specimen: Incision site from Toe, Left Foot Updated: 01/29/25 0743     Gram Stain Result Few WBC's      Many Gram positive cocci    Narrative:      Left 4th toe    Aerobic culture [[id number]] Collected: 01/28/25 0938    Order Status: Completed Specimen: Wound from Toe, Left Foot Updated: 01/29/25 0727     Aerobic Bacterial Culture Further report to follow    Blood culture x two cultures. Draw prior to antibiotics. [[id number]] Collected: 01/28/25 0524    Order Status: Completed Specimen: Blood from Peripheral, Antecubital, Left Updated: 01/29/25 0703     Blood Culture, Routine No Growth to date      No Growth to date    Narrative:      Aerobic and anaerobic    Blood culture x two cultures. Draw prior to antibiotics. [[id number]] Collected: 01/28/25 0524    Order Status: Completed Specimen: Blood from Peripheral, Antecubital, Left Updated: 01/29/25 0703     Blood Culture, Routine No Growth to date      No Growth to date    Narrative:      Aerobic and anaerobic    Aerobic culture [[id number]] Collected: 01/28/25 1720    Order Status: Sent Specimen: Incision site from Toe, Left Foot Updated: 01/28/25 2040    Culture, Anaerobe  [[id number]] Collected: 01/28/25 1720    Order Status: Sent Specimen: Incision site from Toe, Left Foot Updated: 01/28/25 2040    Fungus culture [[id number]] Collected: 01/28/25 1720    Order Status: Sent Specimen: Incision site from Toe, Left Foot Updated: 01/28/25 1857    AFB Culture & Smear [[id number]] Collected: 01/28/25 1720    Order Status: Sent Specimen: Incision site from Toe, Left Foot Updated: 01/28/25 1855    Culture, Anaerobe [[id number]] Collected: 01/28/25 1720    Order Status: Sent Specimen: Incision site from Toe, Left Foot Updated: 01/28/25 1848    Aerobic culture [[id number]] Collected: 01/28/25 1720    Order Status: Sent Specimen: Incision site from Toe, Left Foot Updated: 01/28/25 1848    Fungus culture [[id number]] Collected: 01/28/25 1720    Order Status: Sent Specimen: Incision site from Toe, Left Foot Updated: 01/28/25 1848    AFB Culture & Smear [[id number]] Collected: 01/28/25 1720    Order Status: Sent Specimen: Incision site from Toe, Left Foot Updated: 01/28/25 1843    Gram stain [[id number]] Collected: 01/28/25 0938    Order Status: Completed Specimen: Wound from Toe, Left Foot Updated: 01/28/25 1130     Gram Stain Result Rare WBC's      Rare Gram positive cocci in pairs and chains    Culture, Anaerobic [[id number]] Collected: 01/28/25 0938    Order Status: Sent Specimen: Wound from Toe, Left Foot Updated: 01/28/25 0943

## 2025-01-29 NOTE — NURSING
Pt resting in bed, IV saline locked.  Pt requesting applie juice, BG needs to be checked with vitals.  Boot to left foot on, dressing in place to left foot.  Tele box monitored.  No acute distress noted, pt free from falls or injury.  Bed in low position, wheels locked, bed alarm on, call light in reach for assistance, will continue to monitor.    [facility]  Nurses Note -- 4 Eyes      1/28/2025       Skin assessed on: Q Shift      [x] No Pressure Injuries Present    [x]Prevention Measures Documented    [] Yes LDA  for Pressure Injury Previously documented     [] Yes New Pressure Injury Discovered   [] LDA for New Pressure Injury Added      Attending RN:  Monisha Bouchre RN     Second RN:  NATALIIA Aldrich

## 2025-01-29 NOTE — NURSING
[facility] [place]  Nurses Note -- 4 Eyes      1/29/2025       Skin assessed on: Q Shift      [x] No Pressure Injuries Present    []Prevention Measures Documented    [] Yes LDA  for Pressure Injury Previously documented     [] Yes New Pressure Injury Discovered   [] LDA for New Pressure Injury Added      Attending RN:  Dorie Williamson RN     Second RN:  DAVE Bearden

## 2025-01-29 NOTE — CARE UPDATE
Per nurse, pt's BP has been elevated, last is 179/95. no PRN's available. I think it's due to pain and he is getting dilaudid 1mg Q4H PRN. Can he get something for his elevated BP?     -Norvasc 10mg po qday, first now    Current Facility-Administered Medications:     0.9% NaCl infusion, , Intravenous, Continuous, Katie, Ivis O., DPM, Last Rate: 100 mL/hr at 01/28/25 1957, New Bag at 01/28/25 1957    acetaminophen tablet 650 mg, 650 mg, Oral, Q4H PRN, Katie, Ivis O., DPM    albuterol-ipratropium 2.5 mg-0.5 mg/3 mL nebulizer solution 3 mL, 3 mL, Nebulization, Q6H PRN, Katie, Ivis O., DPM    aspirin EC tablet 81 mg, 81 mg, Oral, Daily, Katie, Ivis O., DPM    ceFEPIme injection 2 g, 2 g, Intravenous, Q12H, Katie, Ivis O., DPM, 2 g at 01/29/25 0456    dextrose 50% injection 12.5 g, 12.5 g, Intravenous, PRN, Katie, Ivis O., DPM    dextrose 50% injection 12.5 g, 12.5 g, Intravenous, PRN, Katie, Ivis O., DPM    dextrose 50% injection 25 g, 25 g, Intravenous, PRN, Katie, Ivis O., DPM    glucagon (human recombinant) injection 1 mg, 1 mg, Intramuscular, PRN, Katie, Ivis O., DPM    glucagon (human recombinant) injection 1 mg, 1 mg, Intramuscular, PRN, Katie, Ivis O., DPM    glucose chewable tablet 16 g, 16 g, Oral, PRN, Katie, Ivis O., DPM    glucose chewable tablet 24 g, 24 g, Oral, PRN, Katie, Ivis O., DPM    HYDROmorphone injection 1 mg, 1 mg, Intravenous, Q4H PRN, Jay Palacios MD, 1 mg at 01/29/25 0509    insulin aspart U-100 pen 0-5 Units, 0-5 Units, Subcutaneous, Q6H PRN, Katie, Ivis O., DPM    magnesium oxide tablet 800 mg, 800 mg, Oral, PRN, Katie, Ivis O., DPM    magnesium oxide tablet 800 mg, 800 mg, Oral, PRN, Katie, Ivis O., DPM    melatonin tablet 6 mg, 6 mg, Oral, Nightly, Katie, Ivis O., DPM, 6 mg at 01/28/25 2007    naloxone 0.4 mg/mL injection 0.02 mg, 0.02 mg, Intravenous, PRN, Katie, Ivis O., DPM    ondansetron disintegrating tablet 8 mg, 8 mg, Oral, Q8H PRN, Katie, Ivis O., DPM    polyethylene glycol packet  17 g, 17 g, Oral, BID PRN, Katie, Ivis O., DPM    potassium bicarbonate disintegrating tablet 35 mEq, 35 mEq, Oral, PRN, Katie, Ivis O., DPM    potassium bicarbonate disintegrating tablet 50 mEq, 50 mEq, Oral, PRN, Katie, Ivis O., DPM    potassium bicarbonate disintegrating tablet 60 mEq, 60 mEq, Oral, PRN, Katie, Ivis O., DPM    potassium, sodium phosphates 280-160-250 mg packet 2 packet, 2 packet, Oral, PRN, Katie, Ivis O., DPM    potassium, sodium phosphates 280-160-250 mg packet 2 packet, 2 packet, Oral, PRN, Katie, Ivis O., DPM    potassium, sodium phosphates 280-160-250 mg packet 2 packet, 2 packet, Oral, PRN, Katie, Ivis O., DPM    prochlorperazine injection Soln 5 mg, 5 mg, Intravenous, Q6H PRN, Katie, Ivis O., DPM    senna-docusate 8.6-50 mg per tablet 1 tablet, 1 tablet, Oral, Daily PRN, Katie, Ivis O., DPM    simethicone chewable tablet 80 mg, 1 tablet, Oral, QID PRN, Katie, Ivis O., DPM    Pharmacy to dose Vancomycin consult, , , Once **AND** vancomycin - pharmacy to dose, , Intravenous, pharmacy to manage frequency, Katie, Ivis O., DPM

## 2025-01-29 NOTE — NURSING
Pt's BP elevated throughout the night PRN pain medication given for pain with moderate relief, BP still elevated.  Dr. Mckeon notified, new orders placed.

## 2025-01-29 NOTE — HOSPITAL COURSE
Admitted for acute OM with gas formation. He was started on Vancomycin and Cefepime. Podiatry was consulted. He underwent an amputation of the left 4th digit. Wound was left open to drain to allow for adequate drainage of infection. He was followed by Podiatry, Infectious Disease and wound care throughout his admission. Patient's initial aerobic wound culture was positive for normal skin pedrito, likely contaminate. Clean margin culture was without growth. No indication for antibiotic coverage on discharge. He remained afebrile without leukocytosis and hemodynamically stable throughout his admission. Patient is hemodynamically stable for discharge with outpatient follow-up with Podiatry.

## 2025-01-29 NOTE — PLAN OF CARE
Problem: Adult Inpatient Plan of Care  Goal: Plan of Care Review  Outcome: Progressing  Goal: Patient-Specific Goal (Individualized)  Outcome: Progressing  Goal: Absence of Hospital-Acquired Illness or Injury  Outcome: Progressing  Goal: Optimal Comfort and Wellbeing  Outcome: Progressing  Goal: Readiness for Transition of Care  Outcome: Progressing     Problem: Wound  Goal: Absence of Infection Signs and Symptoms  Outcome: Progressing  Goal: Optimal Pain Control and Function  Outcome: Progressing

## 2025-01-30 LAB
ACID FAST MOD KINY STN SPEC: NORMAL
ACID FAST MOD KINY STN SPEC: NORMAL
ALBUMIN SERPL BCP-MCNC: 2.4 G/DL (ref 3.5–5.2)
ALP SERPL-CCNC: 94 U/L (ref 40–150)
ALT SERPL W/O P-5'-P-CCNC: 20 U/L (ref 10–44)
ANION GAP SERPL CALC-SCNC: 7 MMOL/L (ref 8–16)
AST SERPL-CCNC: 18 U/L (ref 10–40)
BACTERIA SPEC AEROBE CULT: ABNORMAL
BASOPHILS # BLD AUTO: 0.01 K/UL (ref 0–0.2)
BASOPHILS NFR BLD: 0.1 % (ref 0–1.9)
BILIRUB SERPL-MCNC: 0.2 MG/DL (ref 0.1–1)
BUN SERPL-MCNC: 35 MG/DL (ref 6–20)
CALCIUM SERPL-MCNC: 9.3 MG/DL (ref 8.7–10.5)
CHLORIDE SERPL-SCNC: 108 MMOL/L (ref 95–110)
CO2 SERPL-SCNC: 20 MMOL/L (ref 23–29)
CREAT SERPL-MCNC: 2.5 MG/DL (ref 0.5–1.4)
DIFFERENTIAL METHOD BLD: ABNORMAL
EOSINOPHIL # BLD AUTO: 0.3 K/UL (ref 0–0.5)
EOSINOPHIL NFR BLD: 3.2 % (ref 0–8)
ERYTHROCYTE [DISTWIDTH] IN BLOOD BY AUTOMATED COUNT: 12.2 % (ref 11.5–14.5)
EST. GFR  (NO RACE VARIABLE): 32 ML/MIN/1.73 M^2
FINAL PATHOLOGIC DIAGNOSIS: NORMAL
GLUCOSE SERPL-MCNC: 150 MG/DL (ref 70–110)
GROSS: NORMAL
HCT VFR BLD AUTO: 34.6 % (ref 40–54)
HGB BLD-MCNC: 11.1 G/DL (ref 14–18)
IMM GRANULOCYTES # BLD AUTO: 0.04 K/UL (ref 0–0.04)
IMM GRANULOCYTES NFR BLD AUTO: 0.5 % (ref 0–0.5)
LYMPHOCYTES # BLD AUTO: 1.5 K/UL (ref 1–4.8)
LYMPHOCYTES NFR BLD: 16.9 % (ref 18–48)
Lab: NORMAL
MAGNESIUM SERPL-MCNC: 2 MG/DL (ref 1.6–2.6)
MCH RBC QN AUTO: 27.8 PG (ref 27–31)
MCHC RBC AUTO-ENTMCNC: 32.1 G/DL (ref 32–36)
MCV RBC AUTO: 87 FL (ref 82–98)
MONOCYTES # BLD AUTO: 0.7 K/UL (ref 0.3–1)
MONOCYTES NFR BLD: 7.9 % (ref 4–15)
MYCOBACTERIUM SPEC QL CULT: NORMAL
MYCOBACTERIUM SPEC QL CULT: NORMAL
NEUTROPHILS # BLD AUTO: 6.3 K/UL (ref 1.8–7.7)
NEUTROPHILS NFR BLD: 71.4 % (ref 38–73)
NRBC BLD-RTO: 0 /100 WBC
PHOSPHATE SERPL-MCNC: 4.2 MG/DL (ref 2.7–4.5)
PLATELET # BLD AUTO: 336 K/UL (ref 150–450)
PMV BLD AUTO: 9.3 FL (ref 9.2–12.9)
POCT GLUCOSE: 169 MG/DL (ref 70–110)
POTASSIUM SERPL-SCNC: 4.8 MMOL/L (ref 3.5–5.1)
PROT SERPL-MCNC: 6.9 G/DL (ref 6–8.4)
RBC # BLD AUTO: 3.99 M/UL (ref 4.6–6.2)
SODIUM SERPL-SCNC: 135 MMOL/L (ref 136–145)
VANCOMYCIN SERPL-MCNC: 10.1 UG/ML
WBC # BLD AUTO: 8.85 K/UL (ref 3.9–12.7)

## 2025-01-30 PROCEDURE — 97161 PT EVAL LOW COMPLEX 20 MIN: CPT

## 2025-01-30 PROCEDURE — 97597 DBRDMT OPN WND 1ST 20 CM/<: CPT

## 2025-01-30 PROCEDURE — 97110 THERAPEUTIC EXERCISES: CPT

## 2025-01-30 PROCEDURE — 80053 COMPREHEN METABOLIC PANEL: CPT | Performed by: PODIATRIST

## 2025-01-30 PROCEDURE — 25000003 PHARM REV CODE 250: Performed by: STUDENT IN AN ORGANIZED HEALTH CARE EDUCATION/TRAINING PROGRAM

## 2025-01-30 PROCEDURE — 84100 ASSAY OF PHOSPHORUS: CPT | Performed by: PODIATRIST

## 2025-01-30 PROCEDURE — 25000003 PHARM REV CODE 250: Performed by: PODIATRIST

## 2025-01-30 PROCEDURE — 80202 ASSAY OF VANCOMYCIN: CPT | Performed by: STUDENT IN AN ORGANIZED HEALTH CARE EDUCATION/TRAINING PROGRAM

## 2025-01-30 PROCEDURE — 85025 COMPLETE CBC W/AUTO DIFF WBC: CPT | Performed by: PODIATRIST

## 2025-01-30 PROCEDURE — 99900035 HC TECH TIME PER 15 MIN (STAT)

## 2025-01-30 PROCEDURE — 21400001 HC TELEMETRY ROOM

## 2025-01-30 PROCEDURE — 63600175 PHARM REV CODE 636 W HCPCS: Mod: TB,JZ | Performed by: STUDENT IN AN ORGANIZED HEALTH CARE EDUCATION/TRAINING PROGRAM

## 2025-01-30 PROCEDURE — 94761 N-INVAS EAR/PLS OXIMETRY MLT: CPT

## 2025-01-30 PROCEDURE — 99232 SBSQ HOSP IP/OBS MODERATE 35: CPT | Mod: ,,, | Performed by: PODIATRIST

## 2025-01-30 PROCEDURE — 97530 THERAPEUTIC ACTIVITIES: CPT

## 2025-01-30 PROCEDURE — 63600175 PHARM REV CODE 636 W HCPCS: Performed by: PODIATRIST

## 2025-01-30 PROCEDURE — 99900031 HC PATIENT EDUCATION (STAT)

## 2025-01-30 PROCEDURE — 25000003 PHARM REV CODE 250: Performed by: INTERNAL MEDICINE

## 2025-01-30 PROCEDURE — 83735 ASSAY OF MAGNESIUM: CPT | Performed by: PODIATRIST

## 2025-01-30 PROCEDURE — 36415 COLL VENOUS BLD VENIPUNCTURE: CPT | Performed by: STUDENT IN AN ORGANIZED HEALTH CARE EDUCATION/TRAINING PROGRAM

## 2025-01-30 RX ADMIN — AMLODIPINE BESYLATE 10 MG: 5 TABLET ORAL at 08:01

## 2025-01-30 RX ADMIN — HYDROMORPHONE HYDROCHLORIDE 1 MG: 1 INJECTION, SOLUTION INTRAMUSCULAR; INTRAVENOUS; SUBCUTANEOUS at 03:01

## 2025-01-30 RX ADMIN — ACETAMINOPHEN 650 MG: 325 TABLET ORAL at 09:01

## 2025-01-30 RX ADMIN — Medication 6 MG: at 08:01

## 2025-01-30 RX ADMIN — HYDRALAZINE HYDROCHLORIDE 50 MG: 25 TABLET ORAL at 08:01

## 2025-01-30 RX ADMIN — CEFEPIME 2 G: 2 INJECTION, POWDER, FOR SOLUTION INTRAVENOUS at 05:01

## 2025-01-30 RX ADMIN — VANCOMYCIN HYDROCHLORIDE 1250 MG: 1.25 INJECTION, POWDER, LYOPHILIZED, FOR SOLUTION INTRAVENOUS at 06:01

## 2025-01-30 RX ADMIN — DIPHENHYDRAMINE HYDROCHLORIDE 25 MG: 25 CAPSULE ORAL at 06:01

## 2025-01-30 RX ADMIN — CEFEPIME 2 G: 2 INJECTION, POWDER, FOR SOLUTION INTRAVENOUS at 04:01

## 2025-01-30 RX ADMIN — HYDROMORPHONE HYDROCHLORIDE 1 MG: 1 INJECTION, SOLUTION INTRAMUSCULAR; INTRAVENOUS; SUBCUTANEOUS at 06:01

## 2025-01-30 RX ADMIN — DIPHENHYDRAMINE HYDROCHLORIDE 25 MG: 25 CAPSULE ORAL at 03:01

## 2025-01-30 RX ADMIN — ASPIRIN 81 MG: 81 TABLET, COATED ORAL at 08:01

## 2025-01-30 NOTE — PLAN OF CARE
Problem: Physical Therapy  Goal: Physical Therapy Goal  Description: Goals to be met by: 25     Patient will increase functional independence with mobility by performin. Sit to stand transfer with Modified [place]  2. Bed to chair transfer with Modified [place] using Rolling Walker  3. Gait  x 10-15 feet with Modified [place] using Rolling Walker.   4. Lower extremity exercise program x10 reps per handout, with independence    Outcome: Progressing   No Therapy indicated at time of D/C.  May need RW

## 2025-01-30 NOTE — PT/OT/SLP EVAL
Physical Therapy Evaluation    Patient Name:  Robel Desai   MRN:  [id number]    Recommendations:     Discharge Recommendations: No Therapy Indicated   Discharge Equipment Recommendations:  (RW?)   Barriers to discharge:  Awaiting wound closure    Assessment:     Robel Desai is a 44 y.o. male admitted with a medical diagnosis of Osteomyelitis of fourth toe of left foot.  He presents with the following impairments/functional limitations: gait instability, impaired balance, decreased safety awareness, impaired skin, edema, pain .    Rehab Prognosis: Good; patient would benefit from acute skilled PT services to address these deficits and reach maximum level of function.    Recent Surgery: Procedure(s) (LRB):  AMPUTATION, TOE 4th TOE (Left) 2 Days Post-Op    Plan:     During this hospitalization, patient to be seen 3 x/week to address the identified rehab impairments via gait training, therapeutic activities, therapeutic exercises, neuromuscular re-education and progress toward the following goals:    Plan of Care Expires:  02/13/25    Subjective    Adamaris #[id number] utilized throughout  Chief Complaint: No c/o during evaluation and treat  Patient/Family Comments/goals: Pt agreeable to eval  Pain/Comfort:  Pain Rating 1: 0/10    Patients cultural, spiritual, L[employer]-day conflicts given the current situation: no    Living Environment:  Pt lives alone in a 2-kilo apt with full bath upstairs and half bath downstairs  Prior to admission, patients level of function was Independent with ambulation and ADL's.  Equipment used at home: none.  DME owned (not currently used): none.  Upon discharge, patient will have assistance from Sister?.    Objective:     Communicated with nsg prior to session.  Patient found HOB elevated with peripheral IV  upon PT entry to room.    General Precautions: Standard, fall  Orthopedic Precautions: (L foot Partial Heel WB in Cam boot for transfers per podiatry)   Braces:  (Cam  "walker boot)  Respiratory Status: Room air    Exams:  Cognitive Exam:  Patient is oriented to Person, Place, Time, and Situation  Gross Motor Coordination:  WFL  Sensation:    -       Intact  light/touch B Le's  Skin Integrity/Edema:      -       Skin integrity: L foot with dressing intact, see wound care eval for specifics  -       Edema: Mild dorsum L foot  RLE ROM: WFL  RLE Strength: WFL  LLE ROM: WFL  LLE Strength: WFL    Functional Mobility:  Bed Mobility:     Rolling Left:  modified independence  Rolling Right: modified independence  Scooting: modified independence  Supine to Sit: modified independence  Sit to Supine: modified independence  Transfers:     Sit to Stand:  stand by assistance with no AD and VC for Partial Heel WB L LE   Gait: 4 sidesteps with SBA and VC for partial heel WB L LE in cam boot  Balance: Good-      AM-PAC 6 CLICK MOBILITY  Total Score:19       Treatment & Education:  Pt sat at EOB with Supervision and educated on Cam boot wear.  Pt required Total A to don/doff boot  Educated pt on Partial Heel WB in cam boot for transfers, PT POC, to "call before you fall", to perform B LE as above throughout the day    Patient left HOB elevated with all lines intact, call button in reach, nsg notified, and L LE elevated on pillows with heel floated .    GOALS:   Multidisciplinary Problems       Physical Therapy Goals          Problem: Physical Therapy    Goal Priority Disciplines Outcome Interventions   Physical Therapy Goal     PT, PT/OT Progressing    Description: Goals to be met by: 25     Patient will increase functional independence with mobility by performin. Sit to stand transfer with Modified [place]  2. Bed to chair transfer with Modified [place] using Rolling Walker  3. Gait  x 10-15 feet with Modified [place] using Rolling Walker.   4. Lower extremity exercise program x10 reps per handout, with independence                         DME Justifications:   Sandy " mobility limitation cannot be sufficiently resolved by the use of a cane. His functional mobility deficit can be sufficiently resolved with the use of a Rolling Walker. Patient's mobility limitation significantly impairs their ability to participate in one of more activities of daily living.  The use of a RW will significantly improve the patient's ability to participate in MRADLS and the patient will use it on regular basis in the home.    History:     Past Medical History:   Diagnosis Date    Chronic kidney disease, stage 2, mildly decreased GFR 01/28/2025    Diabetes mellitus     Hypertension        Past Surgical History:   Procedure Laterality Date    APPENDECTOMY      TOE AMPUTATION Left 1/28/2025    Procedure: AMPUTATION, TOE 4th TOE;  Surgeon: Ivis Wilson DPM;  Location: [facility];  Service: Podiatry;  Laterality: Left;       Time Tracking:     PT Received On: 01/30/25  PT Start Time: 1600     PT Stop Time: 1710  PT Total Time (min): 70 min     Billable Minutes: Therapeutic Activity 30 and Therapeutic Exercise 10, Evaluation 15, Selective debridment <20cm 15       01/30/2025

## 2025-01-30 NOTE — PROGRESS NOTES
[facility] Medicine  Progress Note    Patient Name: Robel Desai  MRN: [id number]  Patient Class: IP- Inpatient   Admission Date: 1/28/2025  Length of Stay: 2 days  Attending Physician: Jay Palacios MD  Primary Care Provider: Ashish Greer [facility] -        Subjective     Principal Problem:Osteomyelitis of fourth toe of left foot        HPI:    Robel Desai is a 44 y.o. male who has a past medical history of Diabetes mellitus, CKD2, and Hypertension, presented to the ED with CC of Foot Pain.    Patient has Left foot sweling, erythema and purulent discharge. Noticed it about 4 days ago, and has progressively worsened. On presentation WBC# 14k and ESR >120. XRAY shows soft tissue swelling of the 4th toe left foot with air in the soft tissues of the webspace between the 3rd and 4th toes suggesting soft tissue infection, as well as adjacent bone destruction involving the distal aspect of the proximal phalanx of the 4th toe suggesting osteomyelitis. Given V+Z in ED. Cr 2.9 with ?BL cr 1.4 two years ago. Switched to V+Cefepime for now. Podiatry consulted. Denies CP, AP or SOB.    Overview/Hospital Course:  Patient admit with acute OM with gas formation, taken to surgery, now s/p toe amp. On V+Cefepime currently, awaiting cx and margins.     Interval History:  NAEON.  No new issues.   CC- toe pain  All questions answered and updates on care given.       ROS:  General: Negative for fevers   Cardiac: Negative for chest pain   Pulmonary: Negative for wheezing  GI: Negative for abdominal distention      Vitals:    01/30/25 0322 01/30/25 0413 01/30/25 0617 01/30/25 0730   BP: 120/69   (!) 148/86   BP Location: Left arm   Left arm   Patient Position: Lying   Lying   Pulse: 82 73  80   Resp: 18  18 18   Temp: 97.4 °F (36.3 °C)   97.4 °F (36.3 °C)   TempSrc: Oral   Oral   SpO2: 98%   95%   Weight:              Body mass index is 30.9 kg/m².      PHYSICAL EXAM:  GENERAL APPEARANCE: alert and  cooperative.     HEAD: NC/AT  CARDIAC: There is no cyanosis or pallor.   LUNGS: No apparent wheezing or stridor.  ABDOMEN: Non-distended. No guarding.  MSK: No joint erythema or tenderness.   EXTREMITIES: No significant new deformity or new joint abnormality.   NEUROLOGICAL: CN II-XII grossly intact.   SKIN: No lesions or eruptions.  PSYCHIATRIC: No tangential speech. No Hyperactive features.        Recent Results (from the past 24 hours)   POCT glucose    Collection Time: 01/29/25  8:10 AM   Result Value Ref Range    POCT Glucose 162 (H) 70 - 110 mg/dL   POCT glucose    Collection Time: 01/29/25 11:31 AM   Result Value Ref Range    POCT Glucose 170 (H) 70 - 110 mg/dL   POCT glucose    Collection Time: 01/29/25  4:13 PM   Result Value Ref Range    POCT Glucose 198 (H) 70 - 110 mg/dL   POCT glucose    Collection Time: 01/29/25  7:42 PM   Result Value Ref Range    POCT Glucose 183 (H) 70 - 110 mg/dL   Comprehensive Metabolic Panel (CMP)    Collection Time: 01/30/25  5:15 AM   Result Value Ref Range    Sodium 135 (L) 136 - 145 mmol/L    Potassium 4.8 3.5 - 5.1 mmol/L    Chloride 108 95 - 110 mmol/L    CO2 20 (L) 23 - 29 mmol/L    Glucose 150 (H) 70 - 110 mg/dL    BUN 35 (H) 6 - 20 mg/dL    Creatinine 2.5 (H) 0.5 - 1.4 mg/dL    Calcium 9.3 8.7 - 10.5 mg/dL    Total Protein 6.9 6.0 - 8.4 g/dL    Albumin 2.4 (L) 3.5 - 5.2 g/dL    Total Bilirubin 0.2 0.1 - 1.0 mg/dL    Alkaline Phosphatase 94 40 - 150 U/L    AST 18 10 - 40 U/L    ALT 20 10 - 44 U/L    eGFR 32 (A) >60 mL/min/1.73 m^2    Anion Gap 7 (L) 8 - 16 mmol/L   Magnesium    Collection Time: 01/30/25  5:15 AM   Result Value Ref Range    Magnesium 2.0 1.6 - 2.6 mg/dL   Phosphorus    Collection Time: 01/30/25  5:15 AM   Result Value Ref Range    Phosphorus 4.2 2.7 - 4.5 mg/dL   CBC with Automated Differential    Collection Time: 01/30/25  5:15 AM   Result Value Ref Range    WBC 8.85 3.90 - 12.70 K/uL    RBC 3.99 (L) 4.60 - 6.20 M/uL    Hemoglobin 11.1 (L) 14.0 - 18.0  g/dL    Hematocrit 34.6 (L) 40.0 - 54.0 %    MCV 87 82 - 98 fL    MCH 27.8 27.0 - 31.0 pg    MCHC 32.1 32.0 - 36.0 g/dL    RDW 12.2 11.5 - 14.5 %    Platelets 336 150 - 450 K/uL    MPV 9.3 9.2 - 12.9 fL    Immature Granulocytes 0.5 0.0 - 0.5 %    Gran # (ANC) 6.3 1.8 - 7.7 K/uL    Immature Grans (Abs) 0.04 0.00 - 0.04 K/uL    Lymph # 1.5 1.0 - 4.8 K/uL    Mono # 0.7 0.3 - 1.0 K/uL    Eos # 0.3 0.0 - 0.5 K/uL    Baso # 0.01 0.00 - 0.20 K/uL    nRBC 0 0 /100 WBC    Gran % 71.4 38.0 - 73.0 %    Lymph % 16.9 (L) 18.0 - 48.0 %    Mono % 7.9 4.0 - 15.0 %    Eosinophil % 3.2 0.0 - 8.0 %    Basophil % 0.1 0.0 - 1.9 %    Differential Method Automated    Vancomycin, Random    Collection Time: 01/30/25  5:15 AM   Result Value Ref Range    Vancomycin, Random 10.1 Not established ug/mL       Microbiology Results (last 7 days)       Procedure Component Value Units Date/Time    Blood culture x two cultures. Draw prior to antibiotics. [[id number]] Collected: 01/28/25 0524    Order Status: Completed Specimen: Blood from Peripheral, Antecubital, Left Updated: 01/30/25 0703     Blood Culture, Routine No Growth to date      No Growth to date      No Growth to date    Narrative:      Aerobic and anaerobic    Blood culture x two cultures. Draw prior to antibiotics. [[id number]] Collected: 01/28/25 0524    Order Status: Completed Specimen: Blood from Peripheral, Antecubital, Left Updated: 01/30/25 0703     Blood Culture, Routine No Growth to date      No Growth to date      No Growth to date    Narrative:      Aerobic and anaerobic    AFB Culture & Smear [[id number]] Collected: 01/29/25 1337    Order Status: No result Specimen: Toe Updated: 01/29/25 1339    AFB Culture & Smear [[id number]] Collected: 01/28/25 1720    Order Status: Sent Specimen: Incision site from Toe, Left Foot Updated: 01/29/25 1257    Aerobic culture [[id number]] Collected: 01/28/25 1720    Order Status: Completed Specimen: Incision site from Toe, Left Foot Updated:  01/29/25 0840     Aerobic Bacterial Culture No growth    Narrative:      Proximal margin left 4th toe    Aerobic culture [[id number]] Collected: 01/28/25 1720    Order Status: Completed Specimen: Incision site from Toe, Left Foot Updated: 01/29/25 0839     Aerobic Bacterial Culture Further report to follow    Narrative:      Left 4th toe    Gram stain [[id number]] Collected: 01/28/25 1720    Order Status: Completed Specimen: Incision site from Toe, Left Foot Updated: 01/29/25 0744     Gram Stain Result Rare WBC's      No organisms seen    Narrative:      Proximal margin left 4th toe    Gram stain [[id number]] Collected: 01/28/25 1720    Order Status: Completed Specimen: Incision site from Toe, Left Foot Updated: 01/29/25 0743     Gram Stain Result Few WBC's      Many Gram positive cocci    Narrative:      Left 4th toe    Aerobic culture [[id number]] Collected: 01/28/25 0938    Order Status: Completed Specimen: Wound from Toe, Left Foot Updated: 01/29/25 0727     Aerobic Bacterial Culture Further report to follow    Culture, Anaerobe [[id number]] Collected: 01/28/25 1720    Order Status: Sent Specimen: Incision site from Toe, Left Foot Updated: 01/28/25 2040    Fungus culture [[id number]] Collected: 01/28/25 1720    Order Status: Sent Specimen: Incision site from Toe, Left Foot Updated: 01/28/25 1857    Culture, Anaerobe [[id number]] Collected: 01/28/25 1720    Order Status: Sent Specimen: Incision site from Toe, Left Foot Updated: 01/28/25 1848    Fungus culture [[id number]] Collected: 01/28/25 1720    Order Status: Sent Specimen: Incision site from Toe, Left Foot Updated: 01/28/25 1848    AFB Culture & Smear [[id number]] Collected: 01/28/25 1720    Order Status: Canceled Specimen: Incision site from Toe, Left Foot     Gram stain [[id number]] Collected: 01/28/25 0938    Order Status: Completed Specimen: Wound from Toe, Left Foot Updated: 01/28/25 1130     Gram Stain Result Rare WBC's      Rare Gram positive cocci in  pairs and chains    Culture, Anaerobic [[id number]] Collected: 01/28/25 0938    Order Status: Sent Specimen: Wound from Toe, Left Foot Updated: 01/28/25 0943             Imaging Results              MRI Forefoot WO Contrast LT (Final result)  Result time 01/28/25 13:48:38      Final result by Mina Barron MD (01/28/25 13:48:38)                   Impression:      Findings concerning for acute osteomyelitis of the 4th toe phalanges.  Surrounding soft tissue induration and swelling of the digit with accompanying susceptibility gas focus.  Findings which can be seen in the setting of nearby open wound or gas-forming infection.    Degree of STIR edema involving the forefoot musculature as detailed.  Appearance is nonspecific though could relate to sequela of infectious or non-infectious myositis.    Subcutaneous edema along the dorsal foot with differential considerations to include noninfectious inflammatory change or cellulitis.      Electronically signed by: Mina Barron  Date:    01/28/2025  Time:    13:48               Narrative:    EXAMINATION:  MRI FOREFOOT WO CONTRAST LT    CLINICAL HISTORY:  Osteomyelitis, foot;Podiatry request for OM penetration/depth;    TECHNIQUE:  Multiplanar, multisequence MR imaging of the left forefoot without the use of intravenous gadolinium IV contrast.    COMPARISON:  Left foot radiograph dated 01/20/2025    FINDINGS:  There is abnormal T1 marrow signal hypointensity throughout the 4th toe proximal and middle phalanges as well as portion of the distal phalanx.  There is corresponding T2 STIR marrow edema throughout the 3rd toe phalanges with surrounding soft tissue induration and swelling of the digit and accompanying susceptibility focus (series 3, image 19).  Additional STIR edema of nearby intrinsic foot musculature extending along the metatarsal shaft level.  Remaining osseous T1 marrow signal of the forefoot appears maintained.  There is modest STIR edema at the 3rd  and 4th metatarsal heads, possibly reactive.  Generalized subcutaneous edema tracking extending along the dorsal foot.  No discrete drainable collection.                                        X-Ray Foot Complete Left (Final result)  Result time 01/28/25 06:02:03      Final result by Danie Bartholomew MD (01/28/25 06:02:03)                   Impression:      Soft tissue swelling of the 4th toe left foot with some air in the soft tissues of the webspace between the 3rd and 4th toes suggesting soft tissue infection.  There is some adjacent bone destruction involving the distal aspect of the proximal phalanx of the 4th toe suggesting osteomyelitis.  Further evaluation can be obtained with MRI.    This report was flagged in Epic as abnormal.      Electronically signed by: Danie Bartholomew MD  Date:    01/28/2025  Time:    06:02               Narrative:    EXAMINATION:  XR FOOT COMPLETE 3 VIEW LEFT    CLINICAL HISTORY:  .  Type 2 diabetes mellitus with other skin complications    TECHNIQUE:  AP, lateral and oblique views of the left foot were performed.    COMPARISON:  None    FINDINGS:  No fracture or dislocation.  Lisfranc articulation is congruent.  Cartilage spaces are maintained.  Vascular calcifications present.  Soft tissue swelling of the 4th toe left foot with some air in the soft tissues of the webspace between the 3rd and 4th toes suggesting soft tissue infection.  There is some adjacent bone destruction involving the distal aspect of the proximal phalanx of the 4th toe suggesting osteomyelitis.  No radiopaque foreign body.                                             Assessment and Plan     * Osteomyelitis of fourth toe of left foot  Apparent infection of left forefoot/toe on exam  XRAY showing:  Soft tissue swelling of the 4th toe left foot  Air in the soft tissues of the webspace between the 3rd and 4th toes suggesting soft tissue infection  Adjacent bone destruction involving the distal aspect of the  proximal phalanx of the 4th toe suggesting osteomyelitis  On Vanc and cefepime, poor renal fxn  Podiatry consulted    Acute kidney injury superimposed on stage 2 chronic kidney disease  Creatine 2.9 on admit  IV fluids, recheck   Estimated Creatinine Clearance: 31.7 mL/min (A) (based on SCr of 2.9 mg/dL (H)).  Monitor UOP and serial BMP and adjust therapy as needed.   Renally dose meds.   Avoid nephrotoxic medications and procedures.  BL Cr 1.4 on most recent, 2022    Chronic kidney disease, stage 2, mildly decreased GFR  See MYLA note    Type 2 diabetes mellitus with hyperglycemia, with long-term current use of insulin  Patient states that he takes 10U insulin in AM and 40U in PM but is unclear what type  DA diet, accuchecks, hypoglycemic protocol  SSI only while NPO  start basal bolus if glucoses consistently >180 and eating      Essential hypertension  Goal -180 with hospitalized infection   Hold Home bp meds  PRNs available      VTE Risk Mitigation (From admission, onward)           Ordered     IP VTE LOW RISK PATIENT  Once         01/28/25 0628                    Discharge Planning   FLETCHER:      Code Status: Full Code   Medical Readiness for Discharge Date:   Discharge Plan A: Home with family                        Jay Palacios MD  Department of Hospital Medicine   [facility] - Med Surg

## 2025-01-30 NOTE — PROGRESS NOTES
[place] - Emergency Dept  Podiatry  Progress Note    Patient Name: Robel Desai  MRN: [id number]  Admission Date: 1/28/2025  Hospital Length of Stay: 2 days  Attending Physician: Jay Palacios MD  Primary Care Provider: [facility] -         History of Present Illness: Robel Desai is a 44 y.o. male with  has a past medical history of Chronic kidney disease, stage 2, mildly decreased GFR, Diabetes mellitus, and Hypertension. Consulted to the podiatry for evaluation and treatment of  left foot infection   Location: lateral forefoot/toes Onset of the symptoms was several days ago. Precipitating event:  Patient relates that he had had some increased edema to his legs and feet and he wore a tight shoe which he believes resulted in irritation and infection of the toe .  History of injury: no Current symptoms include: swelling and pain . Signs of infection fever, chills, and fatigue   Symptoms have gradually worsened.     The entirety of our encounter and consent was done with the assistance of phone  Charu #[id number]    1/29/25: Patient seen bedside. Denies pedal pain. Reports itching all over body. Patient reports similar issue in the past after anesthesia.     01/30/2025 Patient seen at bedside resting comfortably.  He relates that he is no longer experiencing pain to the foot.  He relates that the itchiness that he was experiencing has also dissipated.  He has no new pedal complaints.  He is more so concerned about his progress and next steps    Shoe gear: Slip-on shoes    Chief Complaint   Patient presents with    Abscess     To top of left foot X 3-4 days, no drainage noted   Per H&P On presentation WBC# 14k and ESR >120. XRAY shows soft tissue swelling of the 4th toe left foot with air in the soft tissues of the webspace between the 3rd and 4th toes suggesting soft tissue infection, as well as adjacent bone destruction involving the distal aspect of the proximal phalanx of the 4th  toe suggesting osteomyelitis. Given V+Z in ED. Switched to V+Cefepime for now        Scheduled Meds:   amLODIPine  10 mg Oral Daily    aspirin  81 mg Oral Daily    ceFEPime IV (PEDS and ADULTS)  2 g Intravenous Q12H    hydrALAZINE  50 mg Oral Q12H    melatonin  6 mg Oral Nightly     Continuous Infusions:      PRN Meds:  Current Facility-Administered Medications:     acetaminophen, 650 mg, Oral, Q4H PRN    albuterol-ipratropium, 3 mL, Nebulization, Q6H PRN    dextrose 50%, 12.5 g, Intravenous, PRN    dextrose 50%, 12.5 g, Intravenous, PRN    dextrose 50%, 25 g, Intravenous, PRN    diphenhydrAMINE, 25 mg, Oral, Q6H PRN    glucagon (human recombinant), 1 mg, Intramuscular, PRN    glucagon (human recombinant), 1 mg, Intramuscular, PRN    glucose, 16 g, Oral, PRN    glucose, 24 g, Oral, PRN    hydrALAZINE, 20 mg, Intravenous, Q4H PRN    hydrALAZINE, 30 mg, Intravenous, Q4H PRN    HYDROmorphone, 1 mg, Intravenous, Q4H PRN    insulin aspart U-100, 0-5 Units, Subcutaneous, Q6H PRN    magnesium oxide, 800 mg, Oral, PRN    magnesium oxide, 800 mg, Oral, PRN    naloxone, 0.02 mg, Intravenous, PRN    ondansetron, 8 mg, Oral, Q8H PRN    polyethylene glycol, 17 g, Oral, BID PRN    potassium bicarbonate, 35 mEq, Oral, PRN    potassium bicarbonate, 50 mEq, Oral, PRN    potassium bicarbonate, 60 mEq, Oral, PRN    potassium, sodium phosphates, 2 packet, Oral, PRN    potassium, sodium phosphates, 2 packet, Oral, PRN    potassium, sodium phosphates, 2 packet, Oral, PRN    prochlorperazine, 5 mg, Intravenous, Q6H PRN    senna-docusate 8.6-50 mg, 1 tablet, Oral, Daily PRN    simethicone, 1 tablet, Oral, QID PRN    Pharmacy to dose Vancomycin consult, , , Once **AND** vancomycin - pharmacy to dose, , Intravenous, pharmacy to manage frequency    Review of patient's allergies indicates:  No Known Allergies     Past Medical History:   Diagnosis Date    Chronic kidney disease, stage 2, mildly decreased GFR 01/28/2025    Diabetes mellitus      Hypertension      Past Surgical History:   Procedure Laterality Date    APPENDECTOMY      TOE AMPUTATION Left 1/28/2025    Procedure: AMPUTATION, TOE 4th TOE;  Surgeon: Ivis Wilson DPM;  Location: [facility];  Service: Podiatry;  Laterality: Left;       Family History       Problem Relation (Age of Onset)    Diabetes Mother          Tobacco Use    Smoking status: Never     Passive exposure: Never    Smokeless tobacco: Never   Substance and Sexual Activity    Alcohol use: Yes     Comment: occasionally     Drug use: No    Sexual activity: Not on file     Review of Systems   Constitutional:  Negative for appetite change, chills, fatigue and fever.   Respiratory:  Negative for cough and shortness of breath.    Cardiovascular:  Negative for chest pain.   Gastrointestinal:  Negative for diarrhea, nausea and vomiting.   Musculoskeletal:  Positive for myalgias.   Skin:  Positive for color change and wound.   Neurological:  Negative for weakness.        + paresthesia      Objective:     Vital Signs (Most Recent):  Temp: 97.4 °F (36.3 °C) (01/30/25 0730)  Pulse: 80 (01/30/25 0730)  Resp: 18 (01/30/25 0730)  BP: (!) 148/86 (01/30/25 0730)  SpO2: 95 % (01/30/25 0730) Vital Signs (24h Range):  Temp:  [97.4 °F (36.3 °C)-98.8 °F (37.1 °C)] 97.4 °F (36.3 °C)  Pulse:  [] 80  Resp:  [18-20] 18  SpO2:  [94 %-99 %] 95 %  BP: (116-168)/(57-90) 148/86     Weight: 81.6 kg (180 lb)  Body mass index is 30.9 kg/m².    Physical Exam  Vitals and nursing note reviewed.   Constitutional:       General: He is not in acute distress.     Appearance: He is well-developed. He is not toxic-appearing or diaphoretic.      Comments: alert and oriented x 3.    Cardiovascular:      Pulses:           Dorsalis pedis pulses are 2+ on the right side and 2+ on the left side.        Posterior tibial pulses are 2+ on the right side and 2+ on the left side.   Pulmonary:      Effort: No respiratory distress.   Musculoskeletal:         General: No deformity.       Right ankle: No tenderness. No lateral malleolus, medial malleolus, AITF ligament, CF ligament or posterior TF ligament tenderness.      Right Achilles Tendon: No defects. Paula's test negative.      Left ankle: No tenderness. No lateral malleolus, medial malleolus, AITF ligament, CF ligament or posterior TF ligament tenderness.      Left Achilles Tendon: No defects. Paula's test negative.      Right foot: No tenderness or bony tenderness.      Left foot: Swelling and tenderness (4th ray) present. No bony tenderness.      Comments: Muscle strength is 5/5 in all groups bilaterally.           Feet:      Left foot:      Skin integrity: Ulcer (see below) present.   Lymphadenopathy:      Comments: No lymphatic streaking     Skin:     General: Skin is warm and dry.      Coloration: Skin is not pale.      Findings: No rash.      Nails: There is no clubbing.   Neurological:      Sensory: No sensory deficit.      Motor: No atrophy.      Comments: Light touch present     Psychiatric:         Attention and Perception: He is attentive.         Mood and Affect: Mood is not anxious. Affect is not inappropriate.         Speech: He is communicative. Speech is not slurred.         Behavior: Behavior is not combative.        01/30/2025 1/29/25:  Epic unable to load image.   Slight dusky appearance s/p  left 4th toe amputation. No purulent drainage noted.     01/28/2024  Ulcer location: medial 4th digit, left foot  Signs of infection: edema, erythema, pain, malodor, active purulence  Drainage: Sero-Sanguinous and Purulent  Purulence: Yes  Crepitus/fluctuance: Yes  Periwound: Reddened, Macerated  Base: Fibrotic slough  Depth: cartilage  Probe to bone: Yes                Laboratory:  A1C:   Recent Labs   Lab 01/28/25  0523   HGBA1C 6.5*     CBC:   Recent Labs   Lab 01/30/25  0515   WBC 8.85   RBC 3.99*   HGB 11.1*   HCT 34.6*      MCV 87   MCH 27.8   MCHC 32.1     CMP:   Recent Labs   Lab 01/30/25  0515   GLU  150*   CALCIUM 9.3   ALBUMIN 2.4*   PROT 6.9   *   K 4.8   CO2 20*      BUN 35*   CREATININE 2.5*   ALKPHOS 94   ALT 20   AST 18   BILITOT 0.2     CRP:   Recent Labs   Lab 01/28/25 0523   CRP 46.3*     ESR:   Recent Labs   Lab 01/28/25 0523   SEDRATE >120*     Microbiology Results (last 7 days)       Procedure Component Value Units Date/Time    Blood culture x two cultures. Draw prior to antibiotics. [[id number]] Collected: 01/28/25 0524    Order Status: Completed Specimen: Blood from Peripheral, Antecubital, Left Updated: 01/30/25 0703     Blood Culture, Routine No Growth to date      No Growth to date      No Growth to date    Narrative:      Aerobic and anaerobic    Blood culture x two cultures. Draw prior to antibiotics. [[id number]] Collected: 01/28/25 0524    Order Status: Completed Specimen: Blood from Peripheral, Antecubital, Left Updated: 01/30/25 0703     Blood Culture, Routine No Growth to date      No Growth to date      No Growth to date    Narrative:      Aerobic and anaerobic    AFB Culture & Smear [[id number]] Collected: 01/29/25 1337    Order Status: No result Specimen: Toe Updated: 01/29/25 1339    AFB Culture & Smear [[id number]] Collected: 01/28/25 1720    Order Status: Sent Specimen: Incision site from Toe, Left Foot Updated: 01/29/25 1257    Aerobic culture [[id number]] Collected: 01/28/25 1720    Order Status: Completed Specimen: Incision site from Toe, Left Foot Updated: 01/29/25 0840     Aerobic Bacterial Culture No growth    Narrative:      Proximal margin left 4th toe    Aerobic culture [[id number]] Collected: 01/28/25 1720    Order Status: Completed Specimen: Incision site from Toe, Left Foot Updated: 01/29/25 0839     Aerobic Bacterial Culture Further report to follow    Narrative:      Left 4th toe    Gram stain [[id number]] Collected: 01/28/25 1720    Order Status: Completed Specimen: Incision site from Toe, Left Foot Updated: 01/29/25 0744     Gram Stain Result Rare  WBC's      No organisms seen    Narrative:      Proximal margin left 4th toe    Gram stain [[id number]] Collected: 01/28/25 1720    Order Status: Completed Specimen: Incision site from Toe, Left Foot Updated: 01/29/25 0743     Gram Stain Result Few WBC's      Many Gram positive cocci    Narrative:      Left 4th toe    Aerobic culture [[id number]] Collected: 01/28/25 0938    Order Status: Completed Specimen: Wound from Toe, Left Foot Updated: 01/29/25 0727     Aerobic Bacterial Culture Further report to follow    Culture, Anaerobe [[id number]] Collected: 01/28/25 1720    Order Status: Sent Specimen: Incision site from Toe, Left Foot Updated: 01/28/25 2040    Fungus culture [[id number]] Collected: 01/28/25 1720    Order Status: Sent Specimen: Incision site from Toe, Left Foot Updated: 01/28/25 1857    Culture, Anaerobe [[id number]] Collected: 01/28/25 1720    Order Status: Sent Specimen: Incision site from Toe, Left Foot Updated: 01/28/25 1848    Fungus culture [[id number]] Collected: 01/28/25 1720    Order Status: Sent Specimen: Incision site from Toe, Left Foot Updated: 01/28/25 1848    AFB Culture & Smear [[id number]] Collected: 01/28/25 1720    Order Status: Canceled Specimen: Incision site from Toe, Left Foot     Gram stain [[id number]] Collected: 01/28/25 0938    Order Status: Completed Specimen: Wound from Toe, Left Foot Updated: 01/28/25 1130     Gram Stain Result Rare WBC's      Rare Gram positive cocci in pairs and chains    Culture, Anaerobic [[id number]] Collected: 01/28/25 0938    Order Status: Sent Specimen: Wound from Toe, Left Foot Updated: 01/28/25 0943            Diagnostic Results:  Imaging Results              MRI Forefoot WO Contrast LT (Final result)  Result time 01/28/25 13:48:38      Final result by Mina Barron MD (01/28/25 13:48:38)                   Impression:      Findings concerning for acute osteomyelitis of the 4th toe phalanges.  Surrounding soft tissue induration and swelling  of the digit with accompanying susceptibility gas focus.  Findings which can be seen in the setting of nearby open wound or gas-forming infection.    Degree of STIR edema involving the forefoot musculature as detailed.  Appearance is nonspecific though could relate to sequela of infectious or non-infectious myositis.    Subcutaneous edema along the dorsal foot with differential considerations to include noninfectious inflammatory change or cellulitis.      Electronically signed by: Mina Barron  Date:    01/28/2025  Time:    13:48               Narrative:    EXAMINATION:  MRI FOREFOOT WO CONTRAST LT    CLINICAL HISTORY:  Osteomyelitis, foot;Podiatry request for OM penetration/depth;    TECHNIQUE:  Multiplanar, multisequence MR imaging of the left forefoot without the use of intravenous gadolinium IV contrast.    COMPARISON:  Left foot radiograph dated 01/20/2025    FINDINGS:  There is abnormal T1 marrow signal hypointensity throughout the 4th toe proximal and middle phalanges as well as portion of the distal phalanx.  There is corresponding T2 STIR marrow edema throughout the 3rd toe phalanges with surrounding soft tissue induration and swelling of the digit and accompanying susceptibility focus (series 3, image 19).  Additional STIR edema of nearby intrinsic foot musculature extending along the metatarsal shaft level.  Remaining osseous T1 marrow signal of the forefoot appears maintained.  There is modest STIR edema at the 3rd and 4th metatarsal heads, possibly reactive.  Generalized subcutaneous edema tracking extending along the dorsal foot.  No discrete drainable collection.                                        X-Ray Foot Complete Left (Final result)  Result time 01/28/25 06:02:03      Final result by Danie Bartholomew MD (01/28/25 06:02:03)                   Impression:      Soft tissue swelling of the 4th toe left foot with some air in the soft tissues of the webspace between the 3rd and 4th toes  suggesting soft tissue infection.  There is some adjacent bone destruction involving the distal aspect of the proximal phalanx of the 4th toe suggesting osteomyelitis.  Further evaluation can be obtained with MRI.    This report was flagged in Epic as abnormal.      Electronically signed by: Danie Bartholomew MD  Date:    01/28/2025  Time:    06:02               Narrative:    EXAMINATION:  XR FOOT COMPLETE 3 VIEW LEFT    CLINICAL HISTORY:  .  Type 2 diabetes mellitus with other skin complications    TECHNIQUE:  AP, lateral and oblique views of the left foot were performed.    COMPARISON:  None    FINDINGS:  No fracture or dislocation.  Lisfranc articulation is congruent.  Cartilage spaces are maintained.  Vascular calcifications present.  Soft tissue swelling of the 4th toe left foot with some air in the soft tissues of the webspace between the 3rd and 4th toes suggesting soft tissue infection.  There is some adjacent bone destruction involving the distal aspect of the proximal phalanx of the 4th toe suggesting osteomyelitis.  No radiopaque foreign body.                                      Assessment/Plan:     Active Diagnoses:    Diagnosis Date Noted POA    PRINCIPAL PROBLEM:  Osteomyelitis of fourth toe of left foot [M86.9] 01/28/2025 Yes    Chronic kidney disease, stage 2, mildly decreased GFR [N18.2] 01/28/2025 Yes    Acute kidney injury superimposed on stage 2 chronic kidney disease [N17.9, N18.2] 01/28/2025 Yes    Gas gangrene of foot [A48.0] 01/28/2025 Yes    Diabetic foot infection [E11.628, L08.9] 01/28/2025 Yes    Essential hypertension [I10] 05/06/2018 Yes    Type 2 diabetes mellitus with hyperglycemia, with long-term current use of insulin [E11.65, Z79.4] 09/17/2012 Not Applicable      Problems Resolved During this Admission:     Education about the prevention of limb loss.    Discussed wound healing cycle, skin integrity, ways to care for skin.Counseled patient on the effects of biomechanical pressure,  edema, and high blood glucose on healing. He verbalizes understanding that it can increase the chances of delayed healing and this prolonged exposure leads to infection or progression of infection which subsequently can result in loss of limb.    Site stable, improving.  Fibrogranular without purulence.  PT pulse lavage pending.  Discussed primary vs secondary closure in detail.     Vashe moist to dry applied.     Short-term goals include maintaining good offloading and minimizing bioburden, promoting granulation and epithelialization to healing.  Long-term goals include keeping the wound healed by good offloading and medical management under the direction of internist.     We will continue to follow and work in partnership with treatment team on how to best treat patient concern and diagnosis.      Thank you for your consult.     Ivis Wilson DPM  Podiatry  [facility] - Emergency Dept

## 2025-01-30 NOTE — CONSULTS
"In brief, this is a 43 yo man with DM admitted with a diabetic foot infection and forund to have osteomyelitis of his 4th toe. He underwent amputation and is receiving empiric abx. ID is consulted for "OM of toe s/p amp, on V+Cefepime, awaiting cultures and margins."    Would continue abx pending final culture and path results. Full consult to follow, if he has residual infection. Otherwise, he should not require long-term abx.    Thanks,  CB  "

## 2025-01-30 NOTE — PROGRESS NOTES
Pharmacokinetic Assessment Follow Up: IV Vancomycin    Vancomycin serum concentration assessment(s):    The random level was drawn correctly and can be used to guide therapy at this time. The measurement is below the desired definitive target range of 15 to 20 mcg/mL.    Vancomycin Regimen Plan:    Give Vancomycin 1250 mg x1 dose and obtain random level 1/31 at 0400    Drug levels (last 3 results):  Recent Labs   Lab Result Units 01/28/25  1821 01/29/25  0501 01/30/25  0515   Vancomycin, Random ug/mL 16.8 11.3 10.1       Pharmacy will continue to follow and monitor vancomycin.    Please contact pharmacy at extension [phone] for questions regarding this assessment.    Thank you for the consult,   Danie Rubin       Patient brief summary:  Robel Desai is a 44 y.o. male initiated on antimicrobial therapy with IV Vancomycin for treatment of bone/joint infection    Drug Allergies:   Review of patient's allergies indicates:  No Known Allergies    Actual Body Weight:   81.6 kg    Renal Function:   Estimated Creatinine Clearance: 38.3 mL/min (A) (based on SCr of 2.4 mg/dL (H)).,     Dialysis Method (if applicable):  N/A    CBC (last 72 hours):  Recent Labs   Lab Result Units 01/28/25  0523 01/29/25  0501 01/30/25  0515   WBC K/uL 13.60* 11.51 8.85   Hemoglobin g/dL 12.5* 10.6* 11.1*   Hemoglobin A1C % 6.5*  --   --    Hematocrit % 38.1* 34.0* 34.6*   Platelets K/uL 452* 355 336   Gran % % 71.7 76.8* 71.4   Lymph % % 18.1 13.3* 16.9*   Mono % % 8.2 7.9 7.9   Eosinophil % % 1.3 1.4 3.2   Basophil % % 0.3 0.3 0.1   Differential Method  Automated Automated Automated       Metabolic Panel (last 72 hours):  Recent Labs   Lab Result Units 01/28/25  0523 01/28/25  0811 01/29/25  0501   Sodium mmol/L 139  --  138   Potassium mmol/L 4.4  --  5.5*   Chloride mmol/L 103  --  107   CO2 mmol/L 20*  --  21*   Glucose mg/dL 224*  --  176*   Glucose, UA   --  4+*  --    BUN mg/dL 46*  --  35*   Creatinine mg/dL 2.9*  --  2.4*    Albumin g/dL 3.4*  --  2.6*   Total Bilirubin mg/dL 0.3  --  0.3   Alkaline Phosphatase U/L 120  --  96   AST U/L 23  --  17   ALT U/L 34  --  28   Magnesium mg/dL 2.3  --  2.0   Phosphorus mg/dL  --   --  3.5       Vancomycin Administrations:  vancomycin given in the last 96 hours                     vancomycin 750 mg in 0.9% NaCl 250 mL IVPB (admixture device) ()  Restarted 01/29/25 1055     750 mg New Bag  0950    vancomycin (VANCOCIN) 1,750 mg in 0.9% NaCl 500 mL IVPB (mg) 1,750 mg New Bag 01/28/25 0621                    Microbiologic Results:  Microbiology Results (last 7 days)       Procedure Component Value Units Date/Time    AFB Culture & Smear [[id number]] Collected: 01/29/25 1337    Order Status: No result Specimen: Toe Updated: 01/29/25 1339    AFB Culture & Smear [[id number]] Collected: 01/28/25 1720    Order Status: Sent Specimen: Incision site from Toe, Left Foot Updated: 01/29/25 1257    Aerobic culture [[id number]] Collected: 01/28/25 1720    Order Status: Completed Specimen: Incision site from Toe, Left Foot Updated: 01/29/25 0840     Aerobic Bacterial Culture No growth    Narrative:      Proximal margin left 4th toe    Aerobic culture [[id number]] Collected: 01/28/25 1720    Order Status: Completed Specimen: Incision site from Toe, Left Foot Updated: 01/29/25 0839     Aerobic Bacterial Culture Further report to follow    Narrative:      Left 4th toe    Gram stain [[id number]] Collected: 01/28/25 1720    Order Status: Completed Specimen: Incision site from Toe, Left Foot Updated: 01/29/25 0744     Gram Stain Result Rare WBC's      No organisms seen    Narrative:      Proximal margin left 4th toe    Gram stain [[id number]] Collected: 01/28/25 1720    Order Status: Completed Specimen: Incision site from Toe, Left Foot Updated: 01/29/25 0743     Gram Stain Result Few WBC's      Many Gram positive cocci    Narrative:      Left 4th toe    Aerobic culture [[id number]] Collected: 01/28/25 0938    Order  Status: Completed Specimen: Wound from Toe, Left Foot Updated: 01/29/25 0727     Aerobic Bacterial Culture Further report to follow    Blood culture x two cultures. Draw prior to antibiotics. [[id number]] Collected: 01/28/25 0524    Order Status: Completed Specimen: Blood from Peripheral, Antecubital, Left Updated: 01/29/25 0703     Blood Culture, Routine No Growth to date      No Growth to date    Narrative:      Aerobic and anaerobic    Blood culture x two cultures. Draw prior to antibiotics. [[id number]] Collected: 01/28/25 0524    Order Status: Completed Specimen: Blood from Peripheral, Antecubital, Left Updated: 01/29/25 0703     Blood Culture, Routine No Growth to date      No Growth to date    Narrative:      Aerobic and anaerobic    Culture, Anaerobe [[id number]] Collected: 01/28/25 1720    Order Status: Sent Specimen: Incision site from Toe, Left Foot Updated: 01/28/25 2040    Fungus culture [[id number]] Collected: 01/28/25 1720    Order Status: Sent Specimen: Incision site from Toe, Left Foot Updated: 01/28/25 1857    Culture, Anaerobe [[id number]] Collected: 01/28/25 1720    Order Status: Sent Specimen: Incision site from Toe, Left Foot Updated: 01/28/25 1848    Fungus culture [[id number]] Collected: 01/28/25 1720    Order Status: Sent Specimen: Incision site from Toe, Left Foot Updated: 01/28/25 1848    AFB Culture & Smear [[id number]] Collected: 01/28/25 1720    Order Status: Canceled Specimen: Incision site from Toe, Left Foot     Gram stain [[id number]] Collected: 01/28/25 0938    Order Status: Completed Specimen: Wound from Toe, Left Foot Updated: 01/28/25 1130     Gram Stain Result Rare WBC's      Rare Gram positive cocci in pairs and chains    Culture, Anaerobic [[id number]] Collected: 01/28/25 0938    Order Status: Sent Specimen: Wound from Toe, Left Foot Updated: 01/28/25 0943

## 2025-01-30 NOTE — PLAN OF CARE
Pt A&Ox4, free from falls/injury, and able to make needs known during shift. VSS. Pt had c/o pain and itching during shift. PRN pain medication given with relief. Telemetry monitoring continued on box #6519, visible and audible on monitor at nurses' station, no acute distress noted. Bed locked and in lowest position. Bedside table and call light in reach. Will cont to monitor.  Problem: Adult Inpatient Plan of Care  Goal: Plan of Care Review  Outcome: Progressing  Goal: Patient-Specific Goal (Individualized)  Outcome: Progressing  Goal: Absence of Hospital-Acquired Illness or Injury  Outcome: Progressing  Goal: Optimal Comfort and Wellbeing  Outcome: Progressing  Goal: Readiness for Transition of Care  Outcome: Progressing     Problem: Wound  Goal: Absence of Infection Signs and Symptoms  Outcome: Progressing  Goal: Optimal Pain Control and Function  Outcome: Progressing  Goal: Optimal Wound Healing  Outcome: Progressing     Problem: Diabetes Comorbidity  Goal: Blood Glucose Level Within Targeted Range  Outcome: Progressing

## 2025-01-30 NOTE — HPI
"Mr. Desai is a 43 yo man with DM admitted with a diabetic foot infection and forund to have osteomyelitis of his 4th toe. He underwent amputation and is receiving empiric abx. ID is consulted for "OM of toe s/p amp, on V+Cefepime, awaiting cultures and margins." The patient is doing well. He is tolerating abx and denies fever, chills, or SOB.  "

## 2025-01-30 NOTE — PT/OT/SLP EVAL
"Rehab Services Wound Care Evaluation    Robel Desai  [id number]  1/30/2025   16:30-17:10 Eval 15, Selective debridment <20cm 15, TE 10  Diagnosis:L 4th toe infection S/P amputation 1/28/25, HTN, DM, CKD    History of current illness and wound.  Patient is a 44 y.o. male with 4th toe wound which he thinks is from wearing shoes that were too tight.    Past Medical History:   Diagnosis Date    Chronic kidney disease, stage 2, mildly decreased GFR 01/28/2025    Diabetes mellitus     Hypertension         Social History     Socioeconomic History    Marital status: Single   Tobacco Use    Smoking status: Never     Passive exposure: Never    Smokeless tobacco: Never   Substance and Sexual Activity    Alcohol use: Yes     Comment: occasionally     Drug use: No       Precautions: Standard, Diabetes, Fall, and Weightbearing    Allergies as of 01/28/2025    (No Known Allergies)        Pre-medication: rest pauses during treatment and pain medication taken by patient prior to treatment   Adamaris #[id number] utilized  Subjective: Pt reports"very little pain" during pulsed lavage and dressing change    Patient reports pain level in wound: "very little"    Objective: Old dressing removed with Minimal bloody drainage noted.  Sx wound measures approx 5cm(L) x 1.5cm W x 3cm(D) minimal edema noted to dorsum of foot, slight dusky appearance, pink wound bed, no odor.  Clear contents in waste canister following treatment       Treatment:    Clean technique maintained throughout treatment.    Debridement with  Pulsavac and 1000ml N/S followed by dressing change of: Betadine soaked gauze>cast padding>Kerlix> ACE.  Re-educated pt on Partial Heel WB for transfers in Cam walker boot.  Pt performed AP's, TKE's, SLR, and HS's x 10 reps ea with VC/TC to perform correctly.  Educated pt on PT wound POC    Assessment/Need for Treatment:  Patient tolerated today's treatment without any adverse effects.  Patient with diagnosis of L 4th " toe amputation will benefit from skilled Rehab Services Wound Care to maximize wound healing potential and to assist wound to heal through appropriate healing phases.  Problems include diabetes edema dry wound bed decreased granulation tissue    Goals:   Timeframe: (2wks)The following goals were discussed with the patient and He agrees.        Plan:  Frequency of treatment at this time will be daily until discontinued by Podiatry

## 2025-01-31 LAB
ALBUMIN SERPL BCP-MCNC: 2.4 G/DL (ref 3.5–5.2)
ALP SERPL-CCNC: 82 U/L (ref 40–150)
ALT SERPL W/O P-5'-P-CCNC: 23 U/L (ref 10–44)
ANION GAP SERPL CALC-SCNC: 9 MMOL/L (ref 8–16)
AST SERPL-CCNC: 14 U/L (ref 10–40)
BACTERIA SPEC AEROBE CULT: NORMAL
BACTERIA SPEC AEROBE CULT: NORMAL
BASOPHILS # BLD AUTO: 0.03 K/UL (ref 0–0.2)
BASOPHILS NFR BLD: 0.4 % (ref 0–1.9)
BILIRUB SERPL-MCNC: 0.1 MG/DL (ref 0.1–1)
BUN SERPL-MCNC: 44 MG/DL (ref 6–20)
CALCIUM SERPL-MCNC: 9.3 MG/DL (ref 8.7–10.5)
CHLORIDE SERPL-SCNC: 111 MMOL/L (ref 95–110)
CO2 SERPL-SCNC: 19 MMOL/L (ref 23–29)
CREAT SERPL-MCNC: 2.6 MG/DL (ref 0.5–1.4)
DIFFERENTIAL METHOD BLD: ABNORMAL
EOSINOPHIL # BLD AUTO: 0.3 K/UL (ref 0–0.5)
EOSINOPHIL NFR BLD: 3.8 % (ref 0–8)
ERYTHROCYTE [DISTWIDTH] IN BLOOD BY AUTOMATED COUNT: 12.4 % (ref 11.5–14.5)
EST. GFR  (NO RACE VARIABLE): 30 ML/MIN/1.73 M^2
GLUCOSE SERPL-MCNC: 164 MG/DL (ref 70–110)
HCT VFR BLD AUTO: 33.4 % (ref 40–54)
HGB BLD-MCNC: 10.6 G/DL (ref 14–18)
IMM GRANULOCYTES # BLD AUTO: 0.04 K/UL (ref 0–0.04)
IMM GRANULOCYTES NFR BLD AUTO: 0.5 % (ref 0–0.5)
LYMPHOCYTES # BLD AUTO: 1.4 K/UL (ref 1–4.8)
LYMPHOCYTES NFR BLD: 16.4 % (ref 18–48)
MAGNESIUM SERPL-MCNC: 2.2 MG/DL (ref 1.6–2.6)
MCH RBC QN AUTO: 28 PG (ref 27–31)
MCHC RBC AUTO-ENTMCNC: 31.7 G/DL (ref 32–36)
MCV RBC AUTO: 88 FL (ref 82–98)
MONOCYTES # BLD AUTO: 0.7 K/UL (ref 0.3–1)
MONOCYTES NFR BLD: 8.5 % (ref 4–15)
NEUTROPHILS # BLD AUTO: 5.9 K/UL (ref 1.8–7.7)
NEUTROPHILS NFR BLD: 70.4 % (ref 38–73)
NRBC BLD-RTO: 0 /100 WBC
PHOSPHATE SERPL-MCNC: 4.3 MG/DL (ref 2.7–4.5)
PLATELET # BLD AUTO: 375 K/UL (ref 150–450)
PMV BLD AUTO: 9.6 FL (ref 9.2–12.9)
POCT GLUCOSE: 209 MG/DL (ref 70–110)
POCT GLUCOSE: 228 MG/DL (ref 70–110)
POCT GLUCOSE: 228 MG/DL (ref 70–110)
POCT GLUCOSE: 271 MG/DL (ref 70–110)
POCT GLUCOSE: 286 MG/DL (ref 70–110)
POTASSIUM SERPL-SCNC: 4.7 MMOL/L (ref 3.5–5.1)
PROT SERPL-MCNC: 6.9 G/DL (ref 6–8.4)
RBC # BLD AUTO: 3.79 M/UL (ref 4.6–6.2)
SODIUM SERPL-SCNC: 139 MMOL/L (ref 136–145)
VANCOMYCIN SERPL-MCNC: 14.9 UG/ML
WBC # BLD AUTO: 8.37 K/UL (ref 3.9–12.7)

## 2025-01-31 PROCEDURE — 80053 COMPREHEN METABOLIC PANEL: CPT | Performed by: PODIATRIST

## 2025-01-31 PROCEDURE — 85025 COMPLETE CBC W/AUTO DIFF WBC: CPT | Performed by: PODIATRIST

## 2025-01-31 PROCEDURE — 25000003 PHARM REV CODE 250: Performed by: INTERNAL MEDICINE

## 2025-01-31 PROCEDURE — 63600175 PHARM REV CODE 636 W HCPCS: Performed by: STUDENT IN AN ORGANIZED HEALTH CARE EDUCATION/TRAINING PROGRAM

## 2025-01-31 PROCEDURE — 63600175 PHARM REV CODE 636 W HCPCS: Performed by: INTERNAL MEDICINE

## 2025-01-31 PROCEDURE — 25000003 PHARM REV CODE 250: Performed by: PODIATRIST

## 2025-01-31 PROCEDURE — 83735 ASSAY OF MAGNESIUM: CPT | Performed by: PODIATRIST

## 2025-01-31 PROCEDURE — 80202 ASSAY OF VANCOMYCIN: CPT | Performed by: STUDENT IN AN ORGANIZED HEALTH CARE EDUCATION/TRAINING PROGRAM

## 2025-01-31 PROCEDURE — 63600175 PHARM REV CODE 636 W HCPCS: Performed by: PODIATRIST

## 2025-01-31 PROCEDURE — 84100 ASSAY OF PHOSPHORUS: CPT | Performed by: PODIATRIST

## 2025-01-31 PROCEDURE — 99223 1ST HOSP IP/OBS HIGH 75: CPT | Mod: ,,, | Performed by: INTERNAL MEDICINE

## 2025-01-31 PROCEDURE — 97110 THERAPEUTIC EXERCISES: CPT

## 2025-01-31 PROCEDURE — 99232 SBSQ HOSP IP/OBS MODERATE 35: CPT | Mod: ,,, | Performed by: PODIATRIST

## 2025-01-31 PROCEDURE — 97530 THERAPEUTIC ACTIVITIES: CPT

## 2025-01-31 PROCEDURE — 21400001 HC TELEMETRY ROOM

## 2025-01-31 PROCEDURE — 97597 DBRDMT OPN WND 1ST 20 CM/<: CPT

## 2025-01-31 PROCEDURE — 25000003 PHARM REV CODE 250: Performed by: STUDENT IN AN ORGANIZED HEALTH CARE EDUCATION/TRAINING PROGRAM

## 2025-01-31 RX ORDER — CEFTRIAXONE 2 G/1
2 INJECTION, POWDER, FOR SOLUTION INTRAMUSCULAR; INTRAVENOUS
Status: DISCONTINUED | OUTPATIENT
Start: 2025-01-31 | End: 2025-02-03 | Stop reason: HOSPADM

## 2025-01-31 RX ADMIN — INSULIN ASPART 1 UNITS: 100 INJECTION, SOLUTION INTRAVENOUS; SUBCUTANEOUS at 08:01

## 2025-01-31 RX ADMIN — INSULIN ASPART 2 UNITS: 100 INJECTION, SOLUTION INTRAVENOUS; SUBCUTANEOUS at 12:01

## 2025-01-31 RX ADMIN — INSULIN ASPART 3 UNITS: 100 INJECTION, SOLUTION INTRAVENOUS; SUBCUTANEOUS at 04:01

## 2025-01-31 RX ADMIN — CEFEPIME 2 G: 2 INJECTION, POWDER, FOR SOLUTION INTRAVENOUS at 05:01

## 2025-01-31 RX ADMIN — HYDRALAZINE HYDROCHLORIDE 50 MG: 25 TABLET ORAL at 08:01

## 2025-01-31 RX ADMIN — CEFTRIAXONE 2 G: 2 INJECTION, POWDER, FOR SOLUTION INTRAMUSCULAR; INTRAVENOUS at 02:01

## 2025-01-31 RX ADMIN — HYDROMORPHONE HYDROCHLORIDE 1 MG: 1 INJECTION, SOLUTION INTRAMUSCULAR; INTRAVENOUS; SUBCUTANEOUS at 07:01

## 2025-01-31 RX ADMIN — VANCOMYCIN HYDROCHLORIDE 1250 MG: 1.25 INJECTION, POWDER, LYOPHILIZED, FOR SOLUTION INTRAVENOUS at 08:01

## 2025-01-31 RX ADMIN — INSULIN ASPART 2 UNITS: 100 INJECTION, SOLUTION INTRAVENOUS; SUBCUTANEOUS at 09:01

## 2025-01-31 RX ADMIN — AMLODIPINE BESYLATE 10 MG: 5 TABLET ORAL at 08:01

## 2025-01-31 RX ADMIN — HYDROMORPHONE HYDROCHLORIDE 1 MG: 1 INJECTION, SOLUTION INTRAMUSCULAR; INTRAVENOUS; SUBCUTANEOUS at 02:01

## 2025-01-31 RX ADMIN — ASPIRIN 81 MG: 81 TABLET, COATED ORAL at 08:01

## 2025-01-31 NOTE — PLAN OF CARE
Pt alert and oriented x4, reporting some pain early in shift. Tylenol admin. Bed locked in lowest position, call light in reach.    Problem: Adult Inpatient Plan of Care  Goal: Plan of Care Review  Outcome: Progressing  Goal: Patient-Specific Goal (Individualized)  Outcome: Progressing  Goal: Absence of Hospital-Acquired Illness or Injury  Outcome: Progressing  Goal: Optimal Comfort and Wellbeing  Outcome: Progressing  Goal: Readiness for Transition of Care  Outcome: Progressing     Problem: Wound  Goal: Optimal Coping  Outcome: Progressing  Goal: Optimal Functional Ability  Outcome: Progressing  Goal: Absence of Infection Signs and Symptoms  Outcome: Progressing  Goal: Improved Oral Intake  Outcome: Progressing  Goal: Optimal Pain Control and Function  Outcome: Progressing  Goal: Skin Health and Integrity  Outcome: Progressing  Goal: Optimal Wound Healing  Outcome: Progressing     Problem: Acute Kidney Injury/Impairment  Goal: Fluid and Electrolyte Balance  Outcome: Progressing  Goal: Improved Oral Intake  Outcome: Progressing  Goal: Effective Renal Function  Outcome: Progressing     Problem: Diabetes Comorbidity  Goal: Blood Glucose Level Within Targeted Range  Outcome: Progressing     Problem: Fall Injury Risk  Goal: Absence of Fall and Fall-Related Injury  Outcome: Progressing

## 2025-01-31 NOTE — SUBJECTIVE & OBJECTIVE
Past Medical History:   Diagnosis Date    Chronic kidney disease, stage 2, mildly decreased GFR 01/28/2025    Diabetes mellitus     Hypertension        Past Surgical History:   Procedure Laterality Date    APPENDECTOMY      TOE AMPUTATION Left 1/28/2025    Procedure: AMPUTATION, TOE 4th TOE;  Surgeon: Ivis Wilson DPM;  Location: [facility];  Service: Podiatry;  Laterality: Left;       Review of patient's allergies indicates:  No Known Allergies    Medications:  Medications Prior to Admission   Medication Sig    aspirin (ECOTRIN) 81 MG EC tablet Take 81 mg by mouth once daily.    glipiZIDE 5 MG TR24 Take 5 mg by mouth 2 (two) times a day.    JARDIANCE 10 mg tablet Take 10 mg by mouth once daily.    LANTUS SOLOSTAR U-100 INSULIN 100 unit/mL (3 mL) InPn pen Inject 30 Units into the skin 2 (two) times a day.    lisinopriL (PRINIVIL,ZESTRIL) 20 MG tablet Take 1 tablet by mouth once daily.    metFORMIN (GLUCOPHAGE) 1000 MG tablet Take 1,000 mg by mouth 2 (two) times daily with meals.    amLODIPine (NORVASC) 5 MG tablet Take 1 tablet (5 mg total) by mouth once daily.    insulin aspart protamine-insulin aspart (NOVOLOG 70/30) 100 unit/mL (70-30) InPn pen Inject 20 Units into the skin after dinner.    insulin aspart U-100 (NOVOLOG FLEXPEN U-100 INSULIN) 100 unit/mL (3 mL) InPn pen ADMINISTER 25 UNITS UNDER THE SKIN TWICE DAILY    lisinopriL (PRINIVIL,ZESTRIL) 20 MG tablet Take 1 tablet (20 mg total) by mouth once daily.    ondansetron (ZOFRAN) 4 MG tablet Take 1 tablet (4 mg total) by mouth every 8 (eight) hours as needed for Nausea.     Antibiotics (From admission, onward)      Start     Stop Route Frequency Ordered    01/31/25 0800  vancomycin 1,250 mg in 0.9% NaCl 250 mL IVPB (admixture device)         -- IV Every 24 hours (non-standard times) 01/31/25 0659    01/28/25 1700  ceFEPIme injection 2 g         -- IV Every 12 hours (non-standard times) 01/28/25 0629    01/28/25 0725  vancomycin - pharmacy to dose  (vancomycin  "IVPB (PEDS and ADULTS))        Placed in "And" Linked Group    -- IV pharmacy to manage frequency 01/28/25 0625          Antifungals (From admission, onward)      None          Antivirals (From admission, onward)      None             Immunization History   Administered Date(s) Administered    COVID-19, MRNA, LN-S, PF (Pfizer) (Purple Cap) 01/15/2022    COVID-19, vector-nr, rS-Ad26, PF ([employer]) 04/10/2021       Family History       Problem Relation (Age of Onset)    Diabetes Mother          Social History     Socioeconomic History    Marital status: Single   Tobacco Use    Smoking status: Never     Passive exposure: Never    Smokeless tobacco: Never   Substance and Sexual Activity    Alcohol use: Yes     Comment: occasionally     Drug use: No     Review of Systems   Skin:  Positive for wound.   All other systems reviewed and are negative.    Objective:     Vital Signs (Most Recent):  Temp: 98.5 °F (36.9 °C) (01/31/25 0751)  Pulse: 85 (01/31/25 0751)  Resp: 18 (01/31/25 0751)  BP: 133/81 (01/31/25 0751)  SpO2: 97 % (01/31/25 0751) Vital Signs (24h Range):  Temp:  [97.8 °F (36.6 °C)-98.5 °F (36.9 °C)] 98.5 °F (36.9 °C)  Pulse:  [] 85  Resp:  [16-18] 18  SpO2:  [94 %-98 %] 97 %  BP: (116-150)/(71-85) 133/81     Weight: 81.6 kg (180 lb)  Body mass index is 30.9 kg/m².    Estimated Creatinine Clearance: 35.3 mL/min (A) (based on SCr of 2.6 mg/dL (H)).     Physical Exam  Vitals and nursing note reviewed.   Constitutional:       Appearance: Normal appearance.   HENT:      Head: Normocephalic and atraumatic.   Eyes:      Conjunctiva/sclera: Conjunctivae normal.      Pupils: Pupils are equal, round, and reactive to light.   Musculoskeletal:         General: Deformity present.      Comments: Dressing dry (images reviewed)   Neurological:      General: No focal deficit present.      Mental Status: He is alert and oriented to person, place, and time.   Psychiatric:         Mood and Affect: Mood normal.         Thought " Content: Thought content normal.         Judgment: Judgment normal.          Significant Labs: CBC:   Recent Labs   Lab 01/30/25  0515 01/31/25  0419   WBC 8.85 8.37   HGB 11.1* 10.6*   HCT 34.6* 33.4*    375     Microbiology Results (last 7 days)       Procedure Component Value Units Date/Time    Aerobic culture [[id number]] Collected: 01/28/25 1720    Order Status: Completed Specimen: Incision site from Toe, Left Foot Updated: 01/31/25 0833     Aerobic Bacterial Culture Skin pedrito,  no predominant organism    Narrative:      Proximal margin left 4th toe    Aerobic culture [[id number]] Collected: 01/28/25 0938    Order Status: Completed Specimen: Wound from Toe, Left Foot Updated: 01/31/25 0827     Aerobic Bacterial Culture Skin pedrito,  no predominant organism    Blood culture x two cultures. Draw prior to antibiotics. [[id number]] Collected: 01/28/25 0524    Order Status: Completed Specimen: Blood from Peripheral, Antecubital, Left Updated: 01/31/25 0703     Blood Culture, Routine No Growth to date      No Growth to date      No Growth to date      No Growth to date    Narrative:      Aerobic and anaerobic    Blood culture x two cultures. Draw prior to antibiotics. [[id number]] Collected: 01/28/25 0524    Order Status: Completed Specimen: Blood from Peripheral, Antecubital, Left Updated: 01/31/25 0703     Blood Culture, Routine No Growth to date      No Growth to date      No Growth to date      No Growth to date    Narrative:      Aerobic and anaerobic    AFB Culture & Smear [[id number]] Collected: 01/29/25 1337    Order Status: Completed Specimen: Toe Updated: 01/30/25 2127     AFB Culture & Smear Culture in progress     AFB CULTURE STAIN No acid fast bacilli seen.    AFB Culture & Smear [[id number]] Collected: 01/28/25 1720    Order Status: Completed Specimen: Incision site from Toe, Left Foot Updated: 01/30/25 2127     AFB Culture & Smear Culture in progress     AFB CULTURE STAIN No acid fast bacilli  seen.    Narrative:      Left 4th toe    Aerobic culture [[id number]]  (Abnormal) Collected: 01/28/25 1720    Order Status: Completed Specimen: Incision site from Toe, Left Foot Updated: 01/30/25 1111     Aerobic Bacterial Culture HAEMOPHILUS INFLUENZAE  Moderate  Beta Lactamase positive      Narrative:      Left 4th toe    Culture, Anaerobic [[id number]] Collected: 01/28/25 0938    Order Status: Completed Specimen: Wound from Toe, Left Foot Updated: 01/30/25 0813     Anaerobic Culture Culture in progress    Culture, Anaerobe [[id number]] Collected: 01/28/25 1720    Order Status: Completed Specimen: Incision site from Toe, Left Foot Updated: 01/30/25 0812     Anaerobic Culture Culture in progress    Narrative:      Proximal margin left 4th toe    Culture, Anaerobe [[id number]] Collected: 01/28/25 1720    Order Status: Completed Specimen: Incision site from Toe, Left Foot Updated: 01/30/25 0806     Anaerobic Culture Culture in progress    Narrative:      Left 4th toe    Gram stain [[id number]] Collected: 01/28/25 1720    Order Status: Completed Specimen: Incision site from Toe, Left Foot Updated: 01/29/25 0744     Gram Stain Result Rare WBC's      No organisms seen    Narrative:      Proximal margin left 4th toe    Gram stain [[id number]] Collected: 01/28/25 1720    Order Status: Completed Specimen: Incision site from Toe, Left Foot Updated: 01/29/25 0743     Gram Stain Result Few WBC's      Many Gram positive cocci    Narrative:      Left 4th toe    Fungus culture [[id number]] Collected: 01/28/25 1720    Order Status: Sent Specimen: Incision site from Toe, Left Foot Updated: 01/28/25 1857    Fungus culture [[id number]] Collected: 01/28/25 1720    Order Status: Sent Specimen: Incision site from Toe, Left Foot Updated: 01/28/25 1848    AFB Culture & Smear [[id number]] Collected: 01/28/25 1720    Order Status: Canceled Specimen: Incision site from Toe, Left Foot     Gram stain [[id number]] Collected: 01/28/25 0938     Order Status: Completed Specimen: Wound from Toe, Left Foot Updated: 01/28/25 1130     Gram Stain Result Rare WBC's      Rare Gram positive cocci in pairs and chains          Pathology Results  (Last 10 years)                 01/28/25 1700  Specimen to Pathology, Surgery Orthopedics (podiatry) Final result    Narrative:  Pre-op Diagnosis: Osteomyelitis of fourth toe of left foot   [M86.9]   Purulent abscess [L02.91]   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

## 2025-01-31 NOTE — PROGRESS NOTES
Therapy with vancomycin complete and/or consult discontinued by provider.  Pharmacy will sign off, please re-consult as needed.    Thank You

## 2025-01-31 NOTE — NURSING
[facility]  Nurses Note -- 4 Eyes      1/31/2025       Skin assessed on: Q Shift      [x] No Pressure Injuries Present    []Prevention Measures Documented    [] Yes LDA  for Pressure Injury Previously documented     [] Yes New Pressure Injury Discovered   [] LDA for New Pressure Injury Added      Attending RN:  Ema Christian, DAVE     Second RN:  IgnacioRN

## 2025-01-31 NOTE — PROGRESS NOTES
Pharmacokinetic Assessment Follow Up: IV Vancomycin    Vancomycin serum concentration assessment(s):    The random level was drawn correctly and can be used to guide therapy at this time. The measurement is below the desired definitive target range of 15 to 20 mcg/mL.    Vancomycin Regimen Plan:    Change regimen to Vancomycin 1250 mg IV every 24 hours with next serum trough concentration measured at 0700 prior to 2nd dose on 2/1/25    Drug levels (last 3 results):  Recent Labs   Lab Result Units 01/29/25  0501 01/30/25  0515 01/31/25  0419   Vancomycin, Random ug/mL 11.3 10.1 14.9       Pharmacy will continue to follow and monitor vancomycin.    Please contact pharmacy at extension [phone] for questions regarding this assessment.    Thank you for the consult,   Kolton Mackey       Patient brief summary:  Robel Desai is a 44 y.o. male initiated on antimicrobial therapy with IV Vancomycin for treatment of bone/joint infection    The patient's current regimen is Vancomycin 1250mg q24h    Drug Allergies:   Review of patient's allergies indicates:  No Known Allergies    Actual Body Weight:   81.6 kg    Renal Function:   Estimated Creatinine Clearance: 35.3 mL/min (A) (based on SCr of 2.6 mg/dL (H)).,     Dialysis Method (if applicable):  N/A    CBC (last 72 hours):  Recent Labs   Lab Result Units 01/29/25  0501 01/30/25  0515 01/31/25  0419   WBC K/uL 11.51 8.85 8.37   Hemoglobin g/dL 10.6* 11.1* 10.6*   Hematocrit % 34.0* 34.6* 33.4*   Platelets K/uL 355 336 375   Gran % % 76.8* 71.4 70.4   Lymph % % 13.3* 16.9* 16.4*   Mono % % 7.9 7.9 8.5   Eosinophil % % 1.4 3.2 3.8   Basophil % % 0.3 0.1 0.4   Differential Method  Automated Automated Automated       Metabolic Panel (last 72 hours):  Recent Labs   Lab Result Units 01/28/25  0811 01/29/25  0501 01/30/25  0515 01/31/25  0419   Sodium mmol/L  --  138 135* 139   Potassium mmol/L  --  5.5* 4.8 4.7   Chloride mmol/L  --  107 108 111*   CO2 mmol/L  --  21* 20* 19*    Glucose mg/dL  --  176* 150* 164*   Glucose, UA  4+*  --   --   --    BUN mg/dL  --  35* 35* 44*   Creatinine mg/dL  --  2.4* 2.5* 2.6*   Albumin g/dL  --  2.6* 2.4* 2.4*   Total Bilirubin mg/dL  --  0.3 0.2 0.1   Alkaline Phosphatase U/L  --  96 94 82   AST U/L  --  17 18 14   ALT U/L  --  28 20 23   Magnesium mg/dL  --  2.0 2.0 2.2   Phosphorus mg/dL  --  3.5 4.2 4.3       Vancomycin Administrations:  vancomycin given in the last 96 hours                     vancomycin 1,250 mg in 0.9% NaCl 250 mL IVPB (admixture device) ()  Restarted 01/30/25 0657     1,250 mg New Bag  0625    vancomycin 750 mg in 0.9% NaCl 250 mL IVPB (admixture device) ()  Restarted 01/29/25 1055     750 mg New Bag  0950    vancomycin (VANCOCIN) 1,750 mg in 0.9% NaCl 500 mL IVPB (mg) 1,750 mg New Bag 01/28/25 0621                    Microbiologic Results:  Microbiology Results (last 7 days)       Procedure Component Value Units Date/Time    AFB Culture & Smear [[id number]] Collected: 01/29/25 1337    Order Status: Completed Specimen: Toe Updated: 01/30/25 2127     AFB Culture & Smear Culture in progress     AFB CULTURE STAIN No acid fast bacilli seen.    AFB Culture & Smear [[id number]] Collected: 01/28/25 1720    Order Status: Completed Specimen: Incision site from Toe, Left Foot Updated: 01/30/25 2127     AFB Culture & Smear Culture in progress     AFB CULTURE STAIN No acid fast bacilli seen.    Narrative:      Left 4th toe    Aerobic culture [[id number]]  (Abnormal) Collected: 01/28/25 1720    Order Status: Completed Specimen: Incision site from Toe, Left Foot Updated: 01/30/25 1111     Aerobic Bacterial Culture HAEMOPHILUS INFLUENZAE  Moderate  Beta Lactamase positive      Narrative:      Left 4th toe    Aerobic culture [[id number]] Collected: 01/28/25 1720    Order Status: Completed Specimen: Incision site from Toe, Left Foot Updated: 01/30/25 0844     Aerobic Bacterial Culture No significant isolate to date    Narrative:      Proximal  margin left 4th toe    Culture, Anaerobic [[id number]] Collected: 01/28/25 0938    Order Status: Completed Specimen: Wound from Toe, Left Foot Updated: 01/30/25 0813     Anaerobic Culture Culture in progress    Culture, Anaerobe [[id number]] Collected: 01/28/25 1720    Order Status: Completed Specimen: Incision site from Toe, Left Foot Updated: 01/30/25 0812     Anaerobic Culture Culture in progress    Narrative:      Proximal margin left 4th toe    Culture, Anaerobe [[id number]] Collected: 01/28/25 1720    Order Status: Completed Specimen: Incision site from Toe, Left Foot Updated: 01/30/25 0806     Anaerobic Culture Culture in progress    Narrative:      Left 4th toe    Aerobic culture [[id number]] Collected: 01/28/25 0938    Order Status: Completed Specimen: Wound from Toe, Left Foot Updated: 01/30/25 0805     Aerobic Bacterial Culture No significant isolate to date    Blood culture x two cultures. Draw prior to antibiotics. [[id number]] Collected: 01/28/25 0524    Order Status: Completed Specimen: Blood from Peripheral, Antecubital, Left Updated: 01/30/25 0703     Blood Culture, Routine No Growth to date      No Growth to date      No Growth to date    Narrative:      Aerobic and anaerobic    Blood culture x two cultures. Draw prior to antibiotics. [[id number]] Collected: 01/28/25 0524    Order Status: Completed Specimen: Blood from Peripheral, Antecubital, Left Updated: 01/30/25 0703     Blood Culture, Routine No Growth to date      No Growth to date      No Growth to date    Narrative:      Aerobic and anaerobic    Gram stain [[id number]] Collected: 01/28/25 1720    Order Status: Completed Specimen: Incision site from Toe, Left Foot Updated: 01/29/25 0744     Gram Stain Result Rare WBC's      No organisms seen    Narrative:      Proximal margin left 4th toe    Gram stain [[id number]] Collected: 01/28/25 1720    Order Status: Completed Specimen: Incision site from Toe, Left Foot Updated: 01/29/25 0743     Gram  Stain Result Few WBC's      Many Gram positive cocci    Narrative:      Left 4th toe    Fungus culture [[id number]] Collected: 01/28/25 1720    Order Status: Sent Specimen: Incision site from Toe, Left Foot Updated: 01/28/25 1857    Fungus culture [[id number]] Collected: 01/28/25 1720    Order Status: Sent Specimen: Incision site from Toe, Left Foot Updated: 01/28/25 1848    AFB Culture & Smear [[id number]] Collected: 01/28/25 1720    Order Status: Canceled Specimen: Incision site from Toe, Left Foot     Gram stain [[id number]] Collected: 01/28/25 0938    Order Status: Completed Specimen: Wound from Toe, Left Foot Updated: 01/28/25 1130     Gram Stain Result Rare WBC's      Rare Gram positive cocci in pairs and chains

## 2025-01-31 NOTE — PT/OT/SLP PROGRESS
"Physical Therapy Treatment    Patient Name:  Robel Desai   MRN:  [id number]    Recommendations:     Discharge Recommendations: No Therapy Indicated  Discharge Equipment Recommendations:  (RW?, crutches?)  Barriers to discharge:  wound closure    Assessment:     Robel Desai is a 44 y.o. male admitted with a medical diagnosis of Osteomyelitis of fourth toe of left foot.  He presents with the following impairments/functional limitations: gait instability, impaired balance, decreased safety awareness, impaired skin, edema, pain .    Rehab Prognosis: Good; patient would benefit from acute skilled PT services to address these deficits and reach maximum level of function.    Recent Surgery: Procedure(s) (LRB):  AMPUTATION, TOE 4th TOE (Left) 3 Days Post-Op    Plan:     During this hospitalization, patient to be seen 3 x/week to address the identified rehab impairments via gait training, therapeutic activities, therapeutic exercises, neuromuscular re-education and progress toward the following goals:    Plan of Care Expires:  02/13/25    Subjective     Chief Complaint: no c/o  Patient/Family Comments/goals: to get up to the chair for dinner  Pain/Comfort:  Pain Rating 1:  (0/10 pain at rest "a little" with Pulsavac/wound care)  Location - Orientation 1:  (L foot)  Pain Addressed 1: Reposition, Distraction, Cessation of Activity, Nurse notified      Objective:     Communicated with nsg prior to session.  Patient found HOB elevated with peripheral IV upon PT entry to room.     General Precautions: Standard, fall  Orthopedic Precautions:  (Partial Heel WB L LE in cam boot for transfers)  Braces:  (Cam walker boot)  Respiratory Status: Room air     Functional Mobility:  Bed Mobility:     Scooting: modified independence  Supine to Sit: modified independence  Transfers:     Sit to Stand:  supervision with no AD  Bed to chair: Supervision with VC for Partial Heel WB in cam boot and hand placement via stand pivot " "transfer  Balance: Good-      AM-PAC 6 CLICK MOBILITY  Turning over in bed (including adjusting bedclothes, sheets and blankets)?: 4  Sitting down on and standing up from a chair with arms (e.g., wheelchair, bedside commode, etc.): 3  Moving from lying on back to sitting on the side of the bed?: 4  Moving to and from a bed to a chair (including a wheelchair)?: 3  Need to walk in hospital room?: 3  Climbing 3-5 steps with a railing?: 2  Basic Mobility Total Score: 19       Treatment & Education:  Pt performed B AP's x 20 reps and  SLR, and TKE's x 10 reps ea prior to mobilization. Pt sat at EOB with Supervision and donned back robe with Min A and Cam walker boot with Total A.  Transfer training as above.  Educated pt to continue to perform AP's and TKE's while up in recliner, to "call before you fall", and to keep L LE elevated.    Patient left up in chair with call button in reach and nsg notified..    GOALS:   Multidisciplinary Problems       Physical Therapy Goals          Problem: Physical Therapy    Goal Priority Disciplines Outcome Interventions   Physical Therapy Goal     PT, PT/OT Progressing    Description: Goals to be met by: 25     Patient will increase functional independence with mobility by performin. Sit to stand transfer with Modified [place]  2. Bed to chair transfer with Modified [place] using Rolling Walker  3. Gait  x 10-15 feet with Modified [place] using Rolling Walker.   4. Lower extremity exercise program x10 reps per handout, with independence                         DME Justifications:  No DME recommended requiring DME justifications    Time Tracking:     PT Received On: 25  PT Start Time: 1615     PT Stop Time: 1700  PT Total Time (min): 45 min     Billable Minutes: Therapeutic Activity 15, Therapeutic Exercise 15, and Selective Debridement 15    Treatment Type: Treatment  PT/PTA: PT     Number of PTA visits since last PT visit: 0     2025  "

## 2025-01-31 NOTE — ASSESSMENT & PLAN NOTE
45 yo man with toe osteomyelitis, now s/p amputation with clean margin studies pending. Hoping for surgical cure to avoid long-term abx. Prior culture grew Haemophilus.    Recommendations  Continue empiric abx  Will de-escalate to CTX, in the meantime  Discussed via  Harry # [id number]

## 2025-01-31 NOTE — SUBJECTIVE & OBJECTIVE
Interval History: Doing well. No pain or itching. Denies fever, myalgias. Deescalated to [facility]. Cultures pending.     Review of Systems   Constitutional:  Negative for activity change, appetite change, chills, fatigue and fever.   Respiratory:  Negative for cough and shortness of breath.    Cardiovascular:  Negative for chest pain and palpitations.   Gastrointestinal:  Negative for constipation, diarrhea, nausea and vomiting.   Neurological:  Negative for dizziness, syncope and weakness.     Objective:     Vital Signs (Most Recent):  Temp: 98.5 °F (36.9 °C) (01/31/25 0751)  Pulse: 85 (01/31/25 0751)  Resp: 18 (01/31/25 0751)  BP: 133/81 (01/31/25 0751)  SpO2: 97 % (01/31/25 0751) Vital Signs (24h Range):  Temp:  [97.8 °F (36.6 °C)-98.5 °F (36.9 °C)] 98.5 °F (36.9 °C)  Pulse:  [] 85  Resp:  [17-18] 18  SpO2:  [94 %-97 %] 97 %  BP: (116-150)/(71-85) 133/81     Weight: 81.6 kg (180 lb)  Body mass index is 30.9 kg/m².    Intake/Output Summary (Last 24 hours) at 1/31/2025 1043  Last data filed at 1/31/2025 1002  Gross per 24 hour   Intake 693.41 ml   Output 2850 ml   Net -2156.59 ml         Physical Exam  Vitals and nursing note reviewed.   Constitutional:       Appearance: He is obese.   HENT:      Head: Normocephalic.   Cardiovascular:      Rate and Rhythm: Normal rate and regular rhythm.      Pulses: Normal pulses.      Heart sounds: Normal heart sounds.   Pulmonary:      Effort: Pulmonary effort is normal. No respiratory distress.      Breath sounds: Normal breath sounds. No wheezing.   Abdominal:      Palpations: Abdomen is soft.   Musculoskeletal:      Comments: S/p amputation of left 4th phalange. Dressing C/D/I    Skin:     General: Skin is warm and dry.   Neurological:      Mental Status: He is alert and oriented to person, place, and time.             Significant Labs: All pertinent labs within the past 24 hours have been reviewed.    Significant Imaging: I have reviewed all pertinent imaging  results/findings within the past 24 hours.

## 2025-01-31 NOTE — PROGRESS NOTES
[facility] Hospital Medicine  Progress Note    Patient Name: Robel Desai  MRN: [id number]  Patient Class: IP- Inpatient   Admission Date: 1/28/2025  Length of Stay: 3 days  Attending Physician: Jay Palacios MD  Primary Care Provider: Ana Maria Bear NP        Subjective     Principal Problem:Osteomyelitis of fourth toe of left foot        HPI:    Robel Desai is a 44 y.o. male who has a past medical history of Diabetes mellitus, CKD2, and Hypertension, presented to the ED with CC of Foot Pain.    Patient has Left foot sweling, erythema and purulent discharge. Noticed it about 4 days ago, and has progressively worsened. On presentation WBC# 14k and ESR >120. XRAY shows soft tissue swelling of the 4th toe left foot with air in the soft tissues of the webspace between the 3rd and 4th toes suggesting soft tissue infection, as well as adjacent bone destruction involving the distal aspect of the proximal phalanx of the 4th toe suggesting osteomyelitis. Given V+Z in ED. Cr 2.9 with ?BL cr 1.4 two years ago. Switched to V+Cefepime for now. Podiatry consulted. Denies CP, AP or SOB.    Overview/Hospital Course:  Patient admit with acute OM with gas formation, taken to surgery, now s/p toe amp. On V+Cefepime currently, awaiting cx and margins.     Interval History: Doing well. No pain or itching. Denies fever, myalgias. Deescalated to Rocephin. Cultures pending.     Review of Systems   Constitutional:  Negative for activity change, appetite change, chills, fatigue and fever.   Respiratory:  Negative for cough and shortness of breath.    Cardiovascular:  Negative for chest pain and palpitations.   Gastrointestinal:  Negative for constipation, diarrhea, nausea and vomiting.   Neurological:  Negative for dizziness, syncope and weakness.     Objective:     Vital Signs (Most Recent):  Temp: 98.5 °F (36.9 °C) (01/31/25 0751)  Pulse: 85 (01/31/25 0751)  Resp: 18 (01/31/25 0751)  BP: 133/81 (01/31/25 0751)  SpO2: 97  % (01/31/25 0751) Vital Signs (24h Range):  Temp:  [97.8 °F (36.6 °C)-98.5 °F (36.9 °C)] 98.5 °F (36.9 °C)  Pulse:  [] 85  Resp:  [17-18] 18  SpO2:  [94 %-97 %] 97 %  BP: (116-150)/(71-85) 133/81     Weight: 81.6 kg (180 lb)  Body mass index is 30.9 kg/m².    Intake/Output Summary (Last 24 hours) at 1/31/2025 1043  Last data filed at 1/31/2025 1002  Gross per 24 hour   Intake 693.41 ml   Output 2850 ml   Net -2156.59 ml         Physical Exam  Vitals and nursing note reviewed.   Constitutional:       Appearance: He is obese.   HENT:      Head: Normocephalic.   Cardiovascular:      Rate and Rhythm: Normal rate and regular rhythm.      Pulses: Normal pulses.      Heart sounds: Normal heart sounds.   Pulmonary:      Effort: Pulmonary effort is normal. No respiratory distress.      Breath sounds: Normal breath sounds. No wheezing.   Abdominal:      Palpations: Abdomen is soft.   Musculoskeletal:      Comments: S/p amputation of left 4th phalange. Dressing C/D/I    Skin:     General: Skin is warm and dry.   Neurological:      Mental Status: He is alert and oriented to person, place, and time.             Significant Labs: All pertinent labs within the past 24 hours have been reviewed.    Significant Imaging: I have reviewed all pertinent imaging results/findings within the past 24 hours.    Assessment and Plan     * Osteomyelitis of fourth toe of left foot  Apparent infection of left forefoot/toe on exam  XRAY showing:  Soft tissue swelling of the 4th toe left foot  Air in the soft tissues of the webspace between the 3rd and 4th toes suggesting soft tissue infection  Adjacent bone destruction involving the distal aspect of the proximal phalanx of the 4th toe suggesting osteomyelitis  Vanc and cefepime, poor renal fxn. Now deescalated to Rocephin per ID recommendations.   Cultures pending.   Podiatry consulted    Acute kidney injury superimposed on stage 2 chronic kidney disease  Creatine 2.9 on admit  IV fluids,  recheck   Estimated Creatinine Clearance: 31.7 mL/min (A) (based on SCr of 2.9 mg/dL (H)).  Monitor UOP and serial BMP and adjust therapy as needed.   Renally dose meds.   Avoid nephrotoxic medications and procedures.  BL Cr 1.4 on most recent, 2022    Chronic kidney disease, stage 2, mildly decreased GFR  See MYLA note    Type 2 diabetes mellitus with hyperglycemia, with long-term current use of insulin  Patient states that he takes 10U insulin in AM and 40U in PM but is unclear what type  DA diet, accuchecks, hypoglycemic protocol  SSI only while NPO  start basal bolus if glucoses consistently >180 and eating      Essential hypertension  Goal -180 with hospitalized infection   Hold Home bp meds  PRNs available      VTE Risk Mitigation (From admission, onward)           Ordered     IP VTE LOW RISK PATIENT  Once         01/28/25 0628                    Discharge Planning   FLETCHER:      Code Status: Full Code   Medical Readiness for Discharge Date:   Discharge Plan A: Home with family                        Kyle Santos PA-C  Department of Hospital Medicine   [facility] - Med Surg

## 2025-01-31 NOTE — PT/OT/SLP PROGRESS
"[facility] Wound Care Progress Note    Robel Desai  [id number]  1/31/2025   16:45-17:00 Selective Debridment 15m  Diagnosis: L 4th toe infection S/P amputation       Precautions: Standard, Diabetes, Fall, and Weightbearing    Allergies as of 01/28/2025    (No Known Allergies)        Pre-medication:  N/A, no c/o pain    Subjective: Pt asking how wound looks and asking about infection    Patient reports pain level in wound: "a little"    Objective: old dressing removed with minimal serosanguinous drainage noted.  No odor, wound edges slightly macerated.  Clear contents in waste canister post treatment    Treatment:    Clean technique maintained throughout treatment.    Debridement with  Pulsed Lavage and 500ml N/S followed by dressing change consisting of Betadine soaked gauze>Cast padding>Kerlix>ACE    Assessment/Need for Treatment:  Patient tolerated today's treatment without any adverse effects.  Goals remain appropriate.      Plan:  Continue with Plan of Care.      "

## 2025-01-31 NOTE — CONSULTS
"[facility] Surg  Infectious Disease  Consult Note    Patient Name: Robel Desai  MRN: [id number]  Admission Date: 1/28/2025  Hospital Length of Stay: 3 days  Attending Physician: Jay Palacios MD  Primary Care Provider: Ana Maria Bear NP     Isolation Status: No active isolations    Patient information was obtained from patient, past medical records, and ER records.      Inpatient consult to Infectious Diseases  Consult performed by: Wan Cuellar MD  Consult ordered by: Jay Palacios MD        Assessment/Plan:     ID  * Osteomyelitis of fourth toe of left foot  45 yo man with toe osteomyelitis, now s/p amputation with clean margin studies pending. Hoping for surgical cure to avoid long-term abx. Prior culture grew Haemophilus.    Recommendations  Continue empiric abx  Will de-escalate to CTX, in the meantime  Discussed via  Harry # [id number]      Thank you for your consult. I will follow-up with patient. Please contact us if you have any additional questions.    Wan Cuellar MD  Infectious Disease  [facility] Surg    Subjective:     Principal Problem: Osteomyelitis of fourth toe of left foot    HPI: Mr. Desai is a 45 yo man with DM admitted with a diabetic foot infection and forund to have osteomyelitis of his 4th toe. He underwent amputation and is receiving empiric abx. ID is consulted for "OM of toe s/p amp, on V+Cefepime, awaiting cultures and margins." The patient is doing well. He is tolerating abx and denies fever, chills, or SOB.    Past Medical History:   Diagnosis Date    Chronic kidney disease, stage 2, mildly decreased GFR 01/28/2025    Diabetes mellitus     Hypertension        Past Surgical History:   Procedure Laterality Date    APPENDECTOMY      TOE AMPUTATION Left 1/28/2025    Procedure: AMPUTATION, TOE 4th TOE;  Surgeon: Ivis Wilson DPM;  Location: [facility] OR;  Service: Podiatry;  Laterality: Left;       Review of patient's allergies indicates:  No Known " "Allergies    Medications:  Medications Prior to Admission   Medication Sig    aspirin (ECOTRIN) 81 MG EC tablet Take 81 mg by mouth once daily.    glipiZIDE 5 MG TR24 Take 5 mg by mouth 2 (two) times a day.    JARDIANCE 10 mg tablet Take 10 mg by mouth once daily.    LANTUS SOLOSTAR U-100 INSULIN 100 unit/mL (3 mL) InPn pen Inject 30 Units into the skin 2 (two) times a day.    lisinopriL (PRINIVIL,ZESTRIL) 20 MG tablet Take 1 tablet by mouth once daily.    metFORMIN (GLUCOPHAGE) 1000 MG tablet Take 1,000 mg by mouth 2 (two) times daily with meals.    amLODIPine (NORVASC) 5 MG tablet Take 1 tablet (5 mg total) by mouth once daily.    insulin aspart protamine-insulin aspart (NOVOLOG 70/30) 100 unit/mL (70-30) InPn pen Inject 20 Units into the skin after dinner.    insulin aspart U-100 (NOVOLOG FLEXPEN U-100 INSULIN) 100 unit/mL (3 mL) InPn pen ADMINISTER 25 UNITS UNDER THE SKIN TWICE DAILY    lisinopriL (PRINIVIL,ZESTRIL) 20 MG tablet Take 1 tablet (20 mg total) by mouth once daily.    ondansetron (ZOFRAN) 4 MG tablet Take 1 tablet (4 mg total) by mouth every 8 (eight) hours as needed for Nausea.     Antibiotics (From admission, onward)      Start     Stop Route Frequency Ordered    01/31/25 0800  vancomycin 1,250 mg in 0.9% NaCl 250 mL IVPB (admixture device)         -- IV Every 24 hours (non-standard times) 01/31/25 0659    01/28/25 1700  ceFEPIme injection 2 g         -- IV Every 12 hours (non-standard times) 01/28/25 0629    01/28/25 0725  vancomycin - pharmacy to dose  (vancomycin IVPB (PEDS and ADULTS))        Placed in "And" Linked Group    -- IV pharmacy to manage frequency 01/28/25 0625          Antifungals (From admission, onward)      None          Antivirals (From admission, onward)      None             Immunization History   Administered Date(s) Administered    COVID-19, MRNA, LN-S, PF (Pfizer) (Purple Cap) 01/15/2022    COVID-19, vector-nr, rS-Ad26, PF ([facility]) 04/10/2021       Family History       " Problem Relation (Age of Onset)    Diabetes Mother          Social History     Socioeconomic History    Marital status: Single   Tobacco Use    Smoking status: Never     Passive exposure: Never    Smokeless tobacco: Never   Substance and Sexual Activity    Alcohol use: Yes     Comment: occasionally     Drug use: No     Review of Systems   Skin:  Positive for wound.   All other systems reviewed and are negative.    Objective:     Vital Signs (Most Recent):  Temp: 98.5 °F (36.9 °C) (01/31/25 0751)  Pulse: 85 (01/31/25 0751)  Resp: 18 (01/31/25 0751)  BP: 133/81 (01/31/25 0751)  SpO2: 97 % (01/31/25 0751) Vital Signs (24h Range):  Temp:  [97.8 °F (36.6 °C)-98.5 °F (36.9 °C)] 98.5 °F (36.9 °C)  Pulse:  [] 85  Resp:  [16-18] 18  SpO2:  [94 %-98 %] 97 %  BP: (116-150)/(71-85) 133/81     Weight: 81.6 kg (180 lb)  Body mass index is 30.9 kg/m².    Estimated Creatinine Clearance: 35.3 mL/min (A) (based on SCr of 2.6 mg/dL (H)).     Physical Exam  Vitals and nursing note reviewed.   Constitutional:       Appearance: Normal appearance.   HENT:      Head: Normocephalic and atraumatic.   Eyes:      Conjunctiva/sclera: Conjunctivae normal.      Pupils: Pupils are equal, round, and reactive to light.   Musculoskeletal:         General: Deformity present.      Comments: Dressing dry (images reviewed)   Neurological:      General: No focal deficit present.      Mental Status: He is alert and oriented to person, place, and time.   Psychiatric:         Mood and Affect: Mood normal.         Thought Content: Thought content normal.         Judgment: Judgment normal.          Significant Labs: CBC:   Recent Labs   Lab 01/30/25  0515 01/31/25  0419   WBC 8.85 8.37   HGB 11.1* 10.6*   HCT 34.6* 33.4*    375     Microbiology Results (last 7 days)       Procedure Component Value Units Date/Time    Aerobic culture [[id number]] Collected: 01/28/25 5340    Order Status: Completed Specimen: Incision site from Toe, Left Foot Updated:  01/31/25 0833     Aerobic Bacterial Culture Skin pedrito,  no predominant organism    Narrative:      Proximal margin left 4th toe    Aerobic culture [[id number]] Collected: 01/28/25 0938    Order Status: Completed Specimen: Wound from Toe, Left Foot Updated: 01/31/25 0827     Aerobic Bacterial Culture Skin pedrito,  no predominant organism    Blood culture x two cultures. Draw prior to antibiotics. [[id number]] Collected: 01/28/25 0524    Order Status: Completed Specimen: Blood from Peripheral, Antecubital, Left Updated: 01/31/25 0703     Blood Culture, Routine No Growth to date      No Growth to date      No Growth to date      No Growth to date    Narrative:      Aerobic and anaerobic    Blood culture x two cultures. Draw prior to antibiotics. [[id number]] Collected: 01/28/25 0524    Order Status: Completed Specimen: Blood from Peripheral, Antecubital, Left Updated: 01/31/25 0703     Blood Culture, Routine No Growth to date      No Growth to date      No Growth to date      No Growth to date    Narrative:      Aerobic and anaerobic    AFB Culture & Smear [[id number]] Collected: 01/29/25 1337    Order Status: Completed Specimen: Toe Updated: 01/30/25 2127     AFB Culture & Smear Culture in progress     AFB CULTURE STAIN No acid fast bacilli seen.    AFB Culture & Smear [[id number]] Collected: 01/28/25 1720    Order Status: Completed Specimen: Incision site from Toe, Left Foot Updated: 01/30/25 2127     AFB Culture & Smear Culture in progress     AFB CULTURE STAIN No acid fast bacilli seen.    Narrative:      Left 4th toe    Aerobic culture [[id number]]  (Abnormal) Collected: 01/28/25 1720    Order Status: Completed Specimen: Incision site from Toe, Left Foot Updated: 01/30/25 1111     Aerobic Bacterial Culture HAEMOPHILUS INFLUENZAE  Moderate  Beta Lactamase positive      Narrative:      Left 4th toe    Culture, Anaerobic [[id number]] Collected: 01/28/25 0938    Order Status: Completed Specimen: Wound from Toe, Left  Foot Updated: 01/30/25 0813     Anaerobic Culture Culture in progress    Culture, Anaerobe [[id number]] Collected: 01/28/25 1720    Order Status: Completed Specimen: Incision site from Toe, Left Foot Updated: 01/30/25 0812     Anaerobic Culture Culture in progress    Narrative:      Proximal margin left 4th toe    Culture, Anaerobe [[id number]] Collected: 01/28/25 1720    Order Status: Completed Specimen: Incision site from Toe, Left Foot Updated: 01/30/25 0806     Anaerobic Culture Culture in progress    Narrative:      Left 4th toe    Gram stain [[id number]] Collected: 01/28/25 1720    Order Status: Completed Specimen: Incision site from Toe, Left Foot Updated: 01/29/25 0744     Gram Stain Result Rare WBC's      No organisms seen    Narrative:      Proximal margin left 4th toe    Gram stain [[id number]] Collected: 01/28/25 1720    Order Status: Completed Specimen: Incision site from Toe, Left Foot Updated: 01/29/25 0743     Gram Stain Result Few WBC's      Many Gram positive cocci    Narrative:      Left 4th toe    Fungus culture [[id number]] Collected: 01/28/25 1720    Order Status: Sent Specimen: Incision site from Toe, Left Foot Updated: 01/28/25 1857    Fungus culture [[id number]] Collected: 01/28/25 1720    Order Status: Sent Specimen: Incision site from Toe, Left Foot Updated: 01/28/25 1848    AFB Culture & Smear [[id number]] Collected: 01/28/25 1720    Order Status: Canceled Specimen: Incision site from Toe, Left Foot     Gram stain [[id number]] Collected: 01/28/25 0938    Order Status: Completed Specimen: Wound from Toe, Left Foot Updated: 01/28/25 1130     Gram Stain Result Rare WBC's      Rare Gram positive cocci in pairs and chains          Pathology Results  (Last 10 years)                 01/28/25 1700  Specimen to Pathology, Surgery Orthopedics (podiatry) Final result    Narrative:  Pre-op Diagnosis: Osteomyelitis of fourth toe of left foot   [M86.9]   Purulent abscess [L02.91]   Gas gangrene of foot  [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

## 2025-01-31 NOTE — ASSESSMENT & PLAN NOTE
Apparent infection of left forefoot/toe on exam  XRAY showing:  Soft tissue swelling of the 4th toe left foot  Air in the soft tissues of the webspace between the 3rd and 4th toes suggesting soft tissue infection  Adjacent bone destruction involving the distal aspect of the proximal phalanx of the 4th toe suggesting osteomyelitis  Vanc and cefepime, poor renal fxn. Now deescalated to Rocephin per ID recommendations.   Cultures pending.   Podiatry consulted

## 2025-02-01 LAB
ALBUMIN SERPL BCP-MCNC: 2.7 G/DL (ref 3.5–5.2)
ALP SERPL-CCNC: 90 U/L (ref 40–150)
ALT SERPL W/O P-5'-P-CCNC: 28 U/L (ref 10–44)
ANION GAP SERPL CALC-SCNC: 7 MMOL/L (ref 8–16)
AST SERPL-CCNC: 18 U/L (ref 10–40)
BACTERIA BLD CULT: NORMAL
BACTERIA BLD CULT: NORMAL
BACTERIA SPEC ANAEROBE CULT: NORMAL
BASOPHILS # BLD AUTO: 0.03 K/UL (ref 0–0.2)
BASOPHILS NFR BLD: 0.4 % (ref 0–1.9)
BILIRUB SERPL-MCNC: 0.1 MG/DL (ref 0.1–1)
BUN SERPL-MCNC: 42 MG/DL (ref 6–20)
CALCIUM SERPL-MCNC: 9.7 MG/DL (ref 8.7–10.5)
CHLORIDE SERPL-SCNC: 108 MMOL/L (ref 95–110)
CO2 SERPL-SCNC: 22 MMOL/L (ref 23–29)
CREAT SERPL-MCNC: 2.4 MG/DL (ref 0.5–1.4)
DIFFERENTIAL METHOD BLD: ABNORMAL
EOSINOPHIL # BLD AUTO: 0.3 K/UL (ref 0–0.5)
EOSINOPHIL NFR BLD: 3.5 % (ref 0–8)
ERYTHROCYTE [DISTWIDTH] IN BLOOD BY AUTOMATED COUNT: 12.3 % (ref 11.5–14.5)
EST. GFR  (NO RACE VARIABLE): 33 ML/MIN/1.73 M^2
GLUCOSE SERPL-MCNC: 244 MG/DL (ref 70–110)
HCT VFR BLD AUTO: 35 % (ref 40–54)
HGB BLD-MCNC: 11.1 G/DL (ref 14–18)
IMM GRANULOCYTES # BLD AUTO: 0.04 K/UL (ref 0–0.04)
IMM GRANULOCYTES NFR BLD AUTO: 0.5 % (ref 0–0.5)
LYMPHOCYTES # BLD AUTO: 1.6 K/UL (ref 1–4.8)
LYMPHOCYTES NFR BLD: 20.7 % (ref 18–48)
MCH RBC QN AUTO: 27.8 PG (ref 27–31)
MCHC RBC AUTO-ENTMCNC: 31.7 G/DL (ref 32–36)
MCV RBC AUTO: 88 FL (ref 82–98)
MONOCYTES # BLD AUTO: 0.6 K/UL (ref 0.3–1)
MONOCYTES NFR BLD: 7.1 % (ref 4–15)
NEUTROPHILS # BLD AUTO: 5.4 K/UL (ref 1.8–7.7)
NEUTROPHILS NFR BLD: 67.8 % (ref 38–73)
NRBC BLD-RTO: 0 /100 WBC
PLATELET # BLD AUTO: 388 K/UL (ref 150–450)
PMV BLD AUTO: 9.3 FL (ref 9.2–12.9)
POCT GLUCOSE: 201 MG/DL (ref 70–110)
POCT GLUCOSE: 228 MG/DL (ref 70–110)
POCT GLUCOSE: 257 MG/DL (ref 70–110)
POCT GLUCOSE: 269 MG/DL (ref 70–110)
POCT GLUCOSE: 280 MG/DL (ref 70–110)
POTASSIUM SERPL-SCNC: 5 MMOL/L (ref 3.5–5.1)
PROT SERPL-MCNC: 7.3 G/DL (ref 6–8.4)
RBC # BLD AUTO: 4 M/UL (ref 4.6–6.2)
SODIUM SERPL-SCNC: 137 MMOL/L (ref 136–145)
WBC # BLD AUTO: 7.89 K/UL (ref 3.9–12.7)

## 2025-02-01 PROCEDURE — 36415 COLL VENOUS BLD VENIPUNCTURE: CPT | Performed by: PODIATRIST

## 2025-02-01 PROCEDURE — 63600175 PHARM REV CODE 636 W HCPCS: Mod: TB,JZ | Performed by: STUDENT IN AN ORGANIZED HEALTH CARE EDUCATION/TRAINING PROGRAM

## 2025-02-01 PROCEDURE — 63600175 PHARM REV CODE 636 W HCPCS: Performed by: INTERNAL MEDICINE

## 2025-02-01 PROCEDURE — 63600175 PHARM REV CODE 636 W HCPCS

## 2025-02-01 PROCEDURE — 25000003 PHARM REV CODE 250: Performed by: INTERNAL MEDICINE

## 2025-02-01 PROCEDURE — 25000003 PHARM REV CODE 250: Performed by: STUDENT IN AN ORGANIZED HEALTH CARE EDUCATION/TRAINING PROGRAM

## 2025-02-01 PROCEDURE — 85025 COMPLETE CBC W/AUTO DIFF WBC: CPT | Performed by: PODIATRIST

## 2025-02-01 PROCEDURE — 21400001 HC TELEMETRY ROOM

## 2025-02-01 PROCEDURE — 80053 COMPREHEN METABOLIC PANEL: CPT | Performed by: PODIATRIST

## 2025-02-01 PROCEDURE — 25000003 PHARM REV CODE 250: Performed by: PODIATRIST

## 2025-02-01 RX ORDER — IBUPROFEN 200 MG
24 TABLET ORAL
Status: DISCONTINUED | OUTPATIENT
Start: 2025-02-01 | End: 2025-02-03 | Stop reason: HOSPADM

## 2025-02-01 RX ORDER — INSULIN GLARGINE 100 [IU]/ML
20 INJECTION, SOLUTION SUBCUTANEOUS NIGHTLY
Status: DISCONTINUED | OUTPATIENT
Start: 2025-02-01 | End: 2025-02-03 | Stop reason: HOSPADM

## 2025-02-01 RX ORDER — INSULIN GLARGINE 100 [IU]/ML
20 INJECTION, SOLUTION SUBCUTANEOUS DAILY
Status: DISCONTINUED | OUTPATIENT
Start: 2025-02-01 | End: 2025-02-03 | Stop reason: HOSPADM

## 2025-02-01 RX ORDER — INSULIN GLARGINE 100 [IU]/ML
15 INJECTION, SOLUTION SUBCUTANEOUS DAILY
Status: DISCONTINUED | OUTPATIENT
Start: 2025-02-01 | End: 2025-02-01

## 2025-02-01 RX ORDER — IBUPROFEN 200 MG
16 TABLET ORAL
Status: DISCONTINUED | OUTPATIENT
Start: 2025-02-01 | End: 2025-02-03 | Stop reason: HOSPADM

## 2025-02-01 RX ORDER — GLUCAGON 1 MG
1 KIT INJECTION
Status: DISCONTINUED | OUTPATIENT
Start: 2025-02-01 | End: 2025-02-03 | Stop reason: HOSPADM

## 2025-02-01 RX ORDER — INSULIN ASPART 100 [IU]/ML
0-10 INJECTION, SOLUTION INTRAVENOUS; SUBCUTANEOUS
Status: DISCONTINUED | OUTPATIENT
Start: 2025-02-01 | End: 2025-02-03 | Stop reason: HOSPADM

## 2025-02-01 RX ADMIN — INSULIN ASPART 3 UNITS: 100 INJECTION, SOLUTION INTRAVENOUS; SUBCUTANEOUS at 08:02

## 2025-02-01 RX ADMIN — HYDROMORPHONE HYDROCHLORIDE 1 MG: 1 INJECTION, SOLUTION INTRAMUSCULAR; INTRAVENOUS; SUBCUTANEOUS at 05:02

## 2025-02-01 RX ADMIN — HYDROMORPHONE HYDROCHLORIDE 1 MG: 1 INJECTION, SOLUTION INTRAMUSCULAR; INTRAVENOUS; SUBCUTANEOUS at 08:02

## 2025-02-01 RX ADMIN — ASPIRIN 81 MG: 81 TABLET, COATED ORAL at 08:02

## 2025-02-01 RX ADMIN — AMLODIPINE BESYLATE 10 MG: 5 TABLET ORAL at 08:02

## 2025-02-01 RX ADMIN — HYDROMORPHONE HYDROCHLORIDE 1 MG: 1 INJECTION, SOLUTION INTRAMUSCULAR; INTRAVENOUS; SUBCUTANEOUS at 01:02

## 2025-02-01 RX ADMIN — INSULIN GLARGINE 20 UNITS: 100 INJECTION, SOLUTION SUBCUTANEOUS at 11:02

## 2025-02-01 RX ADMIN — Medication 6 MG: at 08:02

## 2025-02-01 RX ADMIN — CEFTRIAXONE 2 G: 2 INJECTION, POWDER, FOR SOLUTION INTRAMUSCULAR; INTRAVENOUS at 04:02

## 2025-02-01 RX ADMIN — HYDRALAZINE HYDROCHLORIDE 50 MG: 25 TABLET ORAL at 08:02

## 2025-02-01 RX ADMIN — INSULIN ASPART 4 UNITS: 100 INJECTION, SOLUTION INTRAVENOUS; SUBCUTANEOUS at 04:02

## 2025-02-01 RX ADMIN — INSULIN GLARGINE 20 UNITS: 100 INJECTION, SOLUTION SUBCUTANEOUS at 08:02

## 2025-02-01 RX ADMIN — INSULIN ASPART 2 UNITS: 100 INJECTION, SOLUTION INTRAVENOUS; SUBCUTANEOUS at 08:02

## 2025-02-01 RX ADMIN — INSULIN ASPART 6 UNITS: 100 INJECTION, SOLUTION INTRAVENOUS; SUBCUTANEOUS at 11:02

## 2025-02-01 NOTE — NURSING
[facility] [place]  Nurses Note -- 4 Eyes      2/1/2025       Skin assessed on: Qshift      [x] No Pressure Injuries Present    [x]Prevention Measures Documented    [] Yes LDA  for Pressure Injury Previously documented     [] Yes New Pressure Injury Discovered   [] LDA for New Pressure Injury Added      Attending RN:  Dorie Williamson RN     Second RN:  NATALIIA Baptiste

## 2025-02-01 NOTE — SUBJECTIVE & OBJECTIVE
Interval History: Doing well. No pain or itching. Denies fever, myalgias. Plan for closure Monday with Podiatry.  Julio #[id number] used for interaction.    Review of Systems   Constitutional:  Negative for activity change, appetite change, chills, fatigue and fever.   Respiratory:  Negative for cough and shortness of breath.    Cardiovascular:  Negative for chest pain and palpitations.   Gastrointestinal:  Negative for constipation, diarrhea, nausea and vomiting.   Neurological:  Negative for dizziness, syncope and weakness.     Objective:     Vital Signs (Most Recent):  Temp: 97.8 °F (36.6 °C) (02/01/25 0810)  Pulse: 83 (02/01/25 0810)  Resp: 20 (02/01/25 0810)  BP: 132/85 (02/01/25 0810)  SpO2: 95 % (02/01/25 0810) Vital Signs (24h Range):  Temp:  [97.8 °F (36.6 °C)-98.6 °F (37 °C)] 97.8 °F (36.6 °C)  Pulse:  [] 83  Resp:  [16-20] 20  SpO2:  [93 %-97 %] 95 %  BP: (126-144)/(73-85) 132/85     Weight: 81.6 kg (180 lb)  Body mass index is 30.9 kg/m².    Intake/Output Summary (Last 24 hours) at 2/1/2025 1043  Last data filed at 2/1/2025 0542  Gross per 24 hour   Intake 880 ml   Output 1725 ml   Net -845 ml         Physical Exam  Vitals and nursing note reviewed.   Constitutional:       Appearance: He is obese.   HENT:      Head: Normocephalic.   Cardiovascular:      Rate and Rhythm: Normal rate and regular rhythm.      Pulses: Normal pulses.      Heart sounds: Normal heart sounds.   Pulmonary:      Effort: Pulmonary effort is normal. No respiratory distress.      Breath sounds: Normal breath sounds. No wheezing.   Abdominal:      Palpations: Abdomen is soft.   Musculoskeletal:      Comments: S/p amputation of left 4th phalange. Dressing C/D/I    Skin:     General: Skin is warm and dry.   Neurological:      Mental Status: He is alert and oriented to person, place, and time.             Significant Labs: All pertinent labs within the past 24 hours have been reviewed.    Significant Imaging: I have  reviewed all pertinent imaging results/findings within the past 24 hours.

## 2025-02-01 NOTE — ASSESSMENT & PLAN NOTE
Apparent infection of left forefoot/toe on exam  XRAY showing:  Soft tissue swelling of the 4th toe left foot  Air in the soft tissues of the webspace between the 3rd and 4th toes suggesting soft tissue infection  Adjacent bone destruction involving the distal aspect of the proximal phalanx of the 4th toe suggesting osteomyelitis  Vanc and cefepime, poor renal fxn. Now deescalated to Rocephin per ID recommendations.   Cultures pending.   Podiatry consulted. S/p amputation of 4th digit proximal phalanx of L foot.   Plan for OR closure with Podiatry on Monday

## 2025-02-01 NOTE — PROGRESS NOTES
[facility] Medicine  Progress Note    Patient Name: Robel Desai  MRN: [id number]  Patient Class: IP- Inpatient   Admission Date: 1/28/2025  Length of Stay: 4 days  Attending Physician: Jay Palacios MD  Primary Care Provider: Ana Maria Bear NP        Subjective     Principal Problem:Osteomyelitis of fourth toe of left foot        HPI:    Robel Desai is a 44 y.o. male who has a past medical history of Diabetes mellitus, CKD2, and Hypertension, presented to the ED with CC of Foot Pain.    Patient has Left foot sweling, erythema and purulent discharge. Noticed it about 4 days ago, and has progressively worsened. On presentation WBC# 14k and ESR >120. XRAY shows soft tissue swelling of the 4th toe left foot with air in the soft tissues of the webspace between the 3rd and 4th toes suggesting soft tissue infection, as well as adjacent bone destruction involving the distal aspect of the proximal phalanx of the 4th toe suggesting osteomyelitis. Given V+Z in ED. Cr 2.9 with ?BL cr 1.4 two years ago. Switched to V+Cefepime for now. Podiatry consulted. Denies CP, AP or SOB.    Overview/Hospital Course:  Patient admit with acute OM with gas formation, taken to surgery, now s/p toe amp. On V+Cefepime currently, awaiting cx and margins.     Interval History: Doing well. No pain or itching. Denies fever, myalgias. Plan for closure Monday with Podiatry.  NanetteUpRaceerika #[id number] used for interaction.    Review of Systems   Constitutional:  Negative for activity change, appetite change, chills, fatigue and fever.   Respiratory:  Negative for cough and shortness of breath.    Cardiovascular:  Negative for chest pain and palpitations.   Gastrointestinal:  Negative for constipation, diarrhea, nausea and vomiting.   Neurological:  Negative for dizziness, syncope and weakness.     Objective:     Vital Signs (Most Recent):  Temp: 97.8 °F (36.6 °C) (02/01/25 0810)  Pulse: 83 (02/01/25 0810)  Resp: 20  (02/01/25 0810)  BP: 132/85 (02/01/25 0810)  SpO2: 95 % (02/01/25 0810) Vital Signs (24h Range):  Temp:  [97.8 °F (36.6 °C)-98.6 °F (37 °C)] 97.8 °F (36.6 °C)  Pulse:  [] 83  Resp:  [16-20] 20  SpO2:  [93 %-97 %] 95 %  BP: (126-144)/(73-85) 132/85     Weight: 81.6 kg (180 lb)  Body mass index is 30.9 kg/m².    Intake/Output Summary (Last 24 hours) at 2/1/2025 1043  Last data filed at 2/1/2025 0542  Gross per 24 hour   Intake 880 ml   Output 1725 ml   Net -845 ml         Physical Exam  Vitals and nursing note reviewed.   Constitutional:       Appearance: He is obese.   HENT:      Head: Normocephalic.   Cardiovascular:      Rate and Rhythm: Normal rate and regular rhythm.      Pulses: Normal pulses.      Heart sounds: Normal heart sounds.   Pulmonary:      Effort: Pulmonary effort is normal. No respiratory distress.      Breath sounds: Normal breath sounds. No wheezing.   Abdominal:      Palpations: Abdomen is soft.   Musculoskeletal:      Comments: S/p amputation of left 4th phalange. Dressing C/D/I    Skin:     General: Skin is warm and dry.   Neurological:      Mental Status: He is alert and oriented to person, place, and time.             Significant Labs: All pertinent labs within the past 24 hours have been reviewed.    Significant Imaging: I have reviewed all pertinent imaging results/findings within the past 24 hours.    Assessment and Plan     * Osteomyelitis of fourth toe of left foot  Apparent infection of left forefoot/toe on exam  XRAY showing:  Soft tissue swelling of the 4th toe left foot  Air in the soft tissues of the webspace between the 3rd and 4th toes suggesting soft tissue infection  Adjacent bone destruction involving the distal aspect of the proximal phalanx of the 4th toe suggesting osteomyelitis  Vanc and cefepime, poor renal fxn. Now deescalated to Rocephin per ID recommendations.   Cultures pending.   Podiatry consulted. S/p amputation of 4th digit proximal phalanx of L foot.   Plan  for OR closure with Podiatry on Monday     Acute kidney injury superimposed on stage 2 chronic kidney disease  Creatine 2.9 on admit, now 2.4.   IV fluids, recheck   Estimated Creatinine Clearance: 38.3 mL/min (A) (based on SCr of 2.4 mg/dL (H)).  Monitor UOP and serial BMP and adjust therapy as needed.   Renally dose meds.   Avoid nephrotoxic medications and procedures.  BL Cr 1.4 on most recent, 2022  Nephrology referral on discharge    Chronic kidney disease, stage 2, mildly decreased GFR  See MYLA note    Type 2 diabetes mellitus with hyperglycemia, with long-term current use of insulin  Patient states that he takes 10U insulin in AM and 40U in PM but is unclear what type  DA diet, accuchecks, hypoglycemic protocol  SSI only while NPO  start basal bolus if glucoses consistently >180 and eating      Essential hypertension  Goal -180 with hospitalized infection   Hold Home bp meds  PRNs available      VTE Risk Mitigation (From admission, onward)           Ordered     IP VTE LOW RISK PATIENT  Once         01/28/25 0628                    Discharge Planning   FLETCHER:      Code Status: Full Code   Medical Readiness for Discharge Date:   Discharge Plan A: Home with family                        Kyle Santos PA-C  Department of Hospital Medicine   [facility] - Med Surg

## 2025-02-01 NOTE — PROGRESS NOTES
[facility] Emergency Dept  Podiatry  Progress Note    Patient Name: Robel Desai  MRN: [id number]  Admission Date: 1/28/2025  Hospital Length of Stay: 3 days  Attending Physician: Jay Palacios MD  Primary Care Provider: Ana Maria Bear NP         History of Present Illness: Robel Desai is a 44 y.o. male with  has a past medical history of Chronic kidney disease, stage 2, mildly decreased GFR, Diabetes mellitus, and Hypertension. Consulted to the podiatry for evaluation and treatment of  left foot infection   Location: lateral forefoot/toes Onset of the symptoms was several days ago. Precipitating event:  Patient relates that he had had some increased edema to his legs and feet and he wore a tight shoe which he believes resulted in irritation and infection of the toe .  History of injury: no Current symptoms include: swelling and pain . Signs of infection fever, chills, and fatigue   Symptoms have gradually worsened.     The entirety of our encounter and consent was done with the assistance of phone  Charu #[id number]    1/29/25: Patient seen bedside. Denies pedal pain. Reports itching all over body. Patient reports similar issue in the past after anesthesia.     01/30/2025 Patient seen at bedside resting comfortably.  He relates that he is no longer experiencing pain to the foot.  He relates that the itchiness that he was experiencing has also dissipated.  He has no new pedal complaints.  He is more so concerned about his progress and next steps    1/31/25: Patient seen bedside. Bandage intact left foot    Shoe gear: Slip-on shoes    Chief Complaint   Patient presents with    Abscess     To top of left foot X 3-4 days, no drainage noted   Per H&P On presentation WBC# 14k and ESR >120. XRAY shows soft tissue swelling of the 4th toe left foot with air in the soft tissues of the webspace between the 3rd and 4th toes suggesting soft tissue infection, as well as adjacent bone destruction involving the  distal aspect of the proximal phalanx of the 4th toe suggesting osteomyelitis. Given V+Z in ED. Switched to V+Cefepime for now        Scheduled Meds:   amLODIPine  10 mg Oral Daily    aspirin  81 mg Oral Daily    cefTRIAXone (Rocephin) IV (PEDS and ADULTS)  2 g Intravenous Q24H    hydrALAZINE  50 mg Oral Q12H    melatonin  6 mg Oral Nightly     Continuous Infusions:      PRN Meds:  Current Facility-Administered Medications:     acetaminophen, 650 mg, Oral, Q4H PRN    albuterol-ipratropium, 3 mL, Nebulization, Q6H PRN    dextrose 50%, 12.5 g, Intravenous, PRN    dextrose 50%, 12.5 g, Intravenous, PRN    dextrose 50%, 25 g, Intravenous, PRN    diphenhydrAMINE, 25 mg, Oral, Q6H PRN    glucagon (human recombinant), 1 mg, Intramuscular, PRN    glucagon (human recombinant), 1 mg, Intramuscular, PRN    glucose, 16 g, Oral, PRN    glucose, 24 g, Oral, PRN    hydrALAZINE, 20 mg, Intravenous, Q4H PRN    hydrALAZINE, 30 mg, Intravenous, Q4H PRN    HYDROmorphone, 1 mg, Intravenous, Q4H PRN    insulin aspart U-100, 0-5 Units, Subcutaneous, Q6H PRN    magnesium oxide, 800 mg, Oral, PRN    magnesium oxide, 800 mg, Oral, PRN    naloxone, 0.02 mg, Intravenous, PRN    ondansetron, 8 mg, Oral, Q8H PRN    polyethylene glycol, 17 g, Oral, BID PRN    potassium bicarbonate, 35 mEq, Oral, PRN    potassium bicarbonate, 50 mEq, Oral, PRN    potassium bicarbonate, 60 mEq, Oral, PRN    potassium, sodium phosphates, 2 packet, Oral, PRN    potassium, sodium phosphates, 2 packet, Oral, PRN    potassium, sodium phosphates, 2 packet, Oral, PRN    prochlorperazine, 5 mg, Intravenous, Q6H PRN    senna-docusate 8.6-50 mg, 1 tablet, Oral, Daily PRN    simethicone, 1 tablet, Oral, QID PRN    Review of patient's allergies indicates:  No Known Allergies     Past Medical History:   Diagnosis Date    Chronic kidney disease, stage 2, mildly decreased GFR 01/28/2025    Diabetes mellitus     Hypertension      Past Surgical History:   Procedure Laterality Date     APPENDECTOMY      TOE AMPUTATION Left 1/28/2025    Procedure: AMPUTATION, TOE 4th TOE;  Surgeon: Ivis Wilson DPM;  Location: [facility];  Service: Podiatry;  Laterality: Left;       Family History       Problem Relation (Age of Onset)    Diabetes Mother          Tobacco Use    Smoking status: Never     Passive exposure: Never    Smokeless tobacco: Never   Substance and Sexual Activity    Alcohol use: Yes     Comment: occasionally     Drug use: No    Sexual activity: Not on file     Review of Systems   Constitutional:  Negative for appetite change, chills, fatigue and fever.   Respiratory:  Negative for cough and shortness of breath.    Cardiovascular:  Negative for chest pain.   Gastrointestinal:  Negative for diarrhea, nausea and vomiting.   Musculoskeletal:  Positive for myalgias.   Skin:  Positive for color change and wound.   Neurological:  Negative for weakness.        + paresthesia      Objective:     Vital Signs (Most Recent):  Temp: 97.8 °F (36.6 °C) (01/31/25 1944)  Pulse: 87 (01/31/25 1948)  Resp: 18 (01/31/25 1944)  BP: (!) 144/84 (01/31/25 1944)  SpO2: (!) 93 % (01/31/25 1944) Vital Signs (24h Range):  Temp:  [97.8 °F (36.6 °C)-98.6 °F (37 °C)] 97.8 °F (36.6 °C)  Pulse:  [] 87  Resp:  [16-18] 18  SpO2:  [93 %-97 %] 93 %  BP: (116-144)/(71-84) 144/84     Weight: 81.6 kg (180 lb)  Body mass index is 30.9 kg/m².    Physical Exam  Vitals and nursing note reviewed.   Constitutional:       General: He is not in acute distress.     Appearance: He is well-developed. He is not toxic-appearing or diaphoretic.      Comments: alert and oriented x 3.    Cardiovascular:      Pulses:           Dorsalis pedis pulses are 2+ on the right side and 2+ on the left side.        Posterior tibial pulses are 2+ on the right side and 2+ on the left side.   Pulmonary:      Effort: No respiratory distress.   Musculoskeletal:         General: No deformity.      Right ankle: No tenderness. No lateral malleolus, medial  malleolus, AITF ligament, CF ligament or posterior TF ligament tenderness.      Right Achilles Tendon: No defects. Paula's test negative.      Left ankle: No tenderness. No lateral malleolus, medial malleolus, AITF ligament, CF ligament or posterior TF ligament tenderness.      Left Achilles Tendon: No defects. Paula's test negative.      Right foot: No tenderness or bony tenderness.      Left foot: Swelling and tenderness (4th ray) present. No bony tenderness.      Comments: Muscle strength is 5/5 in all groups bilaterally.           Feet:      Left foot:      Skin integrity: Ulcer (see below) present.   Lymphadenopathy:      Comments: No lymphatic streaking     Skin:     General: Skin is warm and dry.      Coloration: Skin is not pale.      Findings: No rash.      Nails: There is no clubbing.   Neurological:      Sensory: No sensory deficit.      Motor: No atrophy.      Comments: Light touch present     Psychiatric:         Attention and Perception: He is attentive.         Mood and Affect: Mood is not anxious. Affect is not inappropriate.         Speech: He is communicative. Speech is not slurred.         Behavior: Behavior is not combative.        1/31/25:        01/30/2025 1/29/25:  Epic unable to load image.   Slight dusky appearance s/p  left 4th toe amputation. No purulent drainage noted.     01/28/2024  Ulcer location: medial 4th digit, left foot  Signs of infection: edema, erythema, pain, malodor, active purulence  Drainage: Sero-Sanguinous and Purulent  Purulence: Yes  Crepitus/fluctuance: Yes  Periwound: Reddened, Macerated  Base: Fibrotic slough  Depth: cartilage  Probe to bone: Yes                Laboratory:  A1C:   Recent Labs   Lab 01/28/25  0523   HGBA1C 6.5*     CBC:   Recent Labs   Lab 01/31/25 0419   WBC 8.37   RBC 3.79*   HGB 10.6*   HCT 33.4*      MCV 88   MCH 28.0   MCHC 31.7*     CMP:   Recent Labs   Lab 01/31/25 0419   *   CALCIUM 9.3   ALBUMIN 2.4*   PROT 6.9       K 4.7   CO2 19*   *   BUN 44*   CREATININE 2.6*   ALKPHOS 82   ALT 23   AST 14   BILITOT 0.1     CRP:   Recent Labs   Lab 01/28/25 0523   CRP 46.3*     ESR:   Recent Labs   Lab 01/28/25 0523   SEDRATE >120*     Microbiology Results (last 7 days)       Procedure Component Value Units Date/Time    Aerobic culture [[id number]] Collected: 01/28/25 1720    Order Status: Completed Specimen: Incision site from Toe, Left Foot Updated: 01/31/25 0833     Aerobic Bacterial Culture Skin pedrito,  no predominant organism    Narrative:      Proximal margin left 4th toe    Aerobic culture [[id number]] Collected: 01/28/25 0938    Order Status: Completed Specimen: Wound from Toe, Left Foot Updated: 01/31/25 0827     Aerobic Bacterial Culture Skin pedrito,  no predominant organism    Blood culture x two cultures. Draw prior to antibiotics. [[id number]] Collected: 01/28/25 0524    Order Status: Completed Specimen: Blood from Peripheral, Antecubital, Left Updated: 01/31/25 0703     Blood Culture, Routine No Growth to date      No Growth to date      No Growth to date      No Growth to date    Narrative:      Aerobic and anaerobic    Blood culture x two cultures. Draw prior to antibiotics. [[id number]] Collected: 01/28/25 0524    Order Status: Completed Specimen: Blood from Peripheral, Antecubital, Left Updated: 01/31/25 0703     Blood Culture, Routine No Growth to date      No Growth to date      No Growth to date      No Growth to date    Narrative:      Aerobic and anaerobic    AFB Culture & Smear [[id number]] Collected: 01/29/25 1337    Order Status: Completed Specimen: Toe Updated: 01/30/25 2127     AFB Culture & Smear Culture in progress     AFB CULTURE STAIN No acid fast bacilli seen.    AFB Culture & Smear [[id number]] Collected: 01/28/25 1720    Order Status: Completed Specimen: Incision site from Toe, Left Foot Updated: 01/30/25 2127     AFB Culture & Smear Culture in progress     AFB CULTURE STAIN No acid fast  bacilli seen.    Narrative:      Left 4th toe    Aerobic culture [[id number]]  (Abnormal) Collected: 01/28/25 1720    Order Status: Completed Specimen: Incision site from Toe, Left Foot Updated: 01/30/25 1111     Aerobic Bacterial Culture HAEMOPHILUS INFLUENZAE  Moderate  Beta Lactamase positive      Narrative:      Left 4th toe    Culture, Anaerobic [[id number]] Collected: 01/28/25 0938    Order Status: Completed Specimen: Wound from Toe, Left Foot Updated: 01/30/25 0813     Anaerobic Culture Culture in progress    Culture, Anaerobe [[id number]] Collected: 01/28/25 1720    Order Status: Completed Specimen: Incision site from Toe, Left Foot Updated: 01/30/25 0812     Anaerobic Culture Culture in progress    Narrative:      Proximal margin left 4th toe    Culture, Anaerobe [[id number]] Collected: 01/28/25 1720    Order Status: Completed Specimen: Incision site from Toe, Left Foot Updated: 01/30/25 0806     Anaerobic Culture Culture in progress    Narrative:      Left 4th toe    Gram stain [[id number]] Collected: 01/28/25 1720    Order Status: Completed Specimen: Incision site from Toe, Left Foot Updated: 01/29/25 0744     Gram Stain Result Rare WBC's      No organisms seen    Narrative:      Proximal margin left 4th toe    Gram stain [[id number]] Collected: 01/28/25 1720    Order Status: Completed Specimen: Incision site from Toe, Left Foot Updated: 01/29/25 0743     Gram Stain Result Few WBC's      Many Gram positive cocci    Narrative:      Left 4th toe    Fungus culture [[id number]] Collected: 01/28/25 1720    Order Status: Sent Specimen: Incision site from Toe, Left Foot Updated: 01/28/25 1857    Fungus culture [[id number]] Collected: 01/28/25 1720    Order Status: Sent Specimen: Incision site from Toe, Left Foot Updated: 01/28/25 1848    AFB Culture & Smear [[id number]] Collected: 01/28/25 1720    Order Status: Canceled Specimen: Incision site from Toe, Left Foot     Gram stain [[id number]] Collected: 01/28/25  0938    Order Status: Completed Specimen: Wound from Toe, Left Foot Updated: 01/28/25 1130     Gram Stain Result Rare WBC's      Rare Gram positive cocci in pairs and chains            Diagnostic Results:  Imaging Results              MRI Forefoot WO Contrast LT (Final result)  Result time 01/28/25 13:48:38      Final result by Mina Barron MD (01/28/25 13:48:38)                   Impression:      Findings concerning for acute osteomyelitis of the 4th toe phalanges.  Surrounding soft tissue induration and swelling of the digit with accompanying susceptibility gas focus.  Findings which can be seen in the setting of nearby open wound or gas-forming infection.    Degree of STIR edema involving the forefoot musculature as detailed.  Appearance is nonspecific though could relate to sequela of infectious or non-infectious myositis.    Subcutaneous edema along the dorsal foot with differential considerations to include noninfectious inflammatory change or cellulitis.      Electronically signed by: Mina aBrron  Date:    01/28/2025  Time:    13:48               Narrative:    EXAMINATION:  MRI FOREFOOT WO CONTRAST LT    CLINICAL HISTORY:  Osteomyelitis, foot;Podiatry request for OM penetration/depth;    TECHNIQUE:  Multiplanar, multisequence MR imaging of the left forefoot without the use of intravenous gadolinium IV contrast.    COMPARISON:  Left foot radiograph dated 01/20/2025    FINDINGS:  There is abnormal T1 marrow signal hypointensity throughout the 4th toe proximal and middle phalanges as well as portion of the distal phalanx.  There is corresponding T2 STIR marrow edema throughout the 3rd toe phalanges with surrounding soft tissue induration and swelling of the digit and accompanying susceptibility focus (series 3, image 19).  Additional STIR edema of nearby intrinsic foot musculature extending along the metatarsal shaft level.  Remaining osseous T1 marrow signal of the forefoot appears maintained.   There is modest STIR edema at the 3rd and 4th metatarsal heads, possibly reactive.  Generalized subcutaneous edema tracking extending along the dorsal foot.  No discrete drainable collection.                                        X-Ray Foot Complete Left (Final result)  Result time 01/28/25 06:02:03      Final result by Danie Bartholomew MD (01/28/25 06:02:03)                   Impression:      Soft tissue swelling of the 4th toe left foot with some air in the soft tissues of the webspace between the 3rd and 4th toes suggesting soft tissue infection.  There is some adjacent bone destruction involving the distal aspect of the proximal phalanx of the 4th toe suggesting osteomyelitis.  Further evaluation can be obtained with MRI.    This report was flagged in Epic as abnormal.      Electronically signed by: Danie Bartholomew MD  Date:    01/28/2025  Time:    06:02               Narrative:    EXAMINATION:  XR FOOT COMPLETE 3 VIEW LEFT    CLINICAL HISTORY:  .  Type 2 diabetes mellitus with other skin complications    TECHNIQUE:  AP, lateral and oblique views of the left foot were performed.    COMPARISON:  None    FINDINGS:  No fracture or dislocation.  Lisfranc articulation is congruent.  Cartilage spaces are maintained.  Vascular calcifications present.  Soft tissue swelling of the 4th toe left foot with some air in the soft tissues of the webspace between the 3rd and 4th toes suggesting soft tissue infection.  There is some adjacent bone destruction involving the distal aspect of the proximal phalanx of the 4th toe suggesting osteomyelitis.  No radiopaque foreign body.                                      Assessment/Plan:     Active Diagnoses:    Diagnosis Date Noted POA    PRINCIPAL PROBLEM:  Osteomyelitis of fourth toe of left foot [M86.9] 01/28/2025 Yes    Chronic kidney disease, stage 2, mildly decreased GFR [N18.2] 01/28/2025 Yes    Acute kidney injury superimposed on stage 2 chronic kidney disease [N17.9,  N18.2] 01/28/2025 Yes    Gas gangrene of foot [A48.0] 01/28/2025 Yes    Diabetic foot infection [E11.628, L08.9] 01/28/2025 Yes    Essential hypertension [I10] 05/06/2018 Yes    Type 2 diabetes mellitus with hyperglycemia, with long-term current use of insulin [E11.65, Z79.4] 09/17/2012 Not Applicable      Problems Resolved During this Admission:     Education about the prevention of limb loss.    Discussed wound healing cycle, skin integrity, ways to care for skin.Counseled patient on the effects of biomechanical pressure, edema, and high blood glucose on healing. He verbalizes understanding that it can increase the chances of delayed healing and this prolonged exposure leads to infection or progression of infection which subsequently can result in loss of limb.    Site stable, improving.  Fibrogranular without purulence.  PT pulse lavage pending.  Discussed primary vs secondary closure in detail.     Vashe moist to dry applied.     Short-term goals include maintaining good offloading and minimizing bioburden, promoting granulation and epithelialization to healing.  Long-term goals include keeping the wound healed by good offloading and medical management under the direction of internist.     We will continue to follow and work in partnership with treatment team on how to best treat patient concern and diagnosis.      Thank you for your consult.     Joan Cerna DPM  Podiatry  [facility] [place] - Emergency Dept

## 2025-02-01 NOTE — ASSESSMENT & PLAN NOTE
Creatine 2.9 on admit, now 2.4.   IV fluids, recheck   Estimated Creatinine Clearance: 38.3 mL/min (A) (based on SCr of 2.4 mg/dL (H)).  Monitor UOP and serial BMP and adjust therapy as needed.   Renally dose meds.   Avoid nephrotoxic medications and procedures.  BL Cr 1.4 on most recent, 2022  Nephrology referral on discharge

## 2025-02-02 PROBLEM — M86.172 OSTEOMYELITIS OF ANKLE OR FOOT, LEFT, ACUTE: Status: ACTIVE | Noted: 2025-02-02

## 2025-02-02 LAB
ALBUMIN SERPL BCP-MCNC: 2.8 G/DL (ref 3.5–5.2)
ALP SERPL-CCNC: 93 U/L (ref 40–150)
ALT SERPL W/O P-5'-P-CCNC: 28 U/L (ref 10–44)
ANION GAP SERPL CALC-SCNC: 12 MMOL/L (ref 8–16)
AST SERPL-CCNC: 16 U/L (ref 10–40)
BASOPHILS # BLD AUTO: 0.04 K/UL (ref 0–0.2)
BASOPHILS NFR BLD: 0.5 % (ref 0–1.9)
BILIRUB SERPL-MCNC: 0.2 MG/DL (ref 0.1–1)
BUN SERPL-MCNC: 41 MG/DL (ref 6–20)
CALCIUM SERPL-MCNC: 9.6 MG/DL (ref 8.7–10.5)
CHLORIDE SERPL-SCNC: 110 MMOL/L (ref 95–110)
CO2 SERPL-SCNC: 19 MMOL/L (ref 23–29)
CREAT SERPL-MCNC: 2.3 MG/DL (ref 0.5–1.4)
DIFFERENTIAL METHOD BLD: ABNORMAL
EOSINOPHIL # BLD AUTO: 0.2 K/UL (ref 0–0.5)
EOSINOPHIL NFR BLD: 2.8 % (ref 0–8)
ERYTHROCYTE [DISTWIDTH] IN BLOOD BY AUTOMATED COUNT: 12.4 % (ref 11.5–14.5)
EST. GFR  (NO RACE VARIABLE): 35 ML/MIN/1.73 M^2
GLUCOSE SERPL-MCNC: 129 MG/DL (ref 70–110)
HCT VFR BLD AUTO: 34.1 % (ref 40–54)
HGB BLD-MCNC: 11 G/DL (ref 14–18)
IMM GRANULOCYTES # BLD AUTO: 0.03 K/UL (ref 0–0.04)
IMM GRANULOCYTES NFR BLD AUTO: 0.4 % (ref 0–0.5)
LYMPHOCYTES # BLD AUTO: 1.5 K/UL (ref 1–4.8)
LYMPHOCYTES NFR BLD: 18.6 % (ref 18–48)
MCH RBC QN AUTO: 28.1 PG (ref 27–31)
MCHC RBC AUTO-ENTMCNC: 32.3 G/DL (ref 32–36)
MCV RBC AUTO: 87 FL (ref 82–98)
MONOCYTES # BLD AUTO: 0.6 K/UL (ref 0.3–1)
MONOCYTES NFR BLD: 6.9 % (ref 4–15)
NEUTROPHILS # BLD AUTO: 5.8 K/UL (ref 1.8–7.7)
NEUTROPHILS NFR BLD: 70.8 % (ref 38–73)
NRBC BLD-RTO: 0 /100 WBC
PLATELET # BLD AUTO: 424 K/UL (ref 150–450)
PMV BLD AUTO: 9.5 FL (ref 9.2–12.9)
POCT GLUCOSE: 125 MG/DL (ref 70–110)
POCT GLUCOSE: 154 MG/DL (ref 70–110)
POCT GLUCOSE: 209 MG/DL (ref 70–110)
POCT GLUCOSE: 291 MG/DL (ref 70–110)
POTASSIUM SERPL-SCNC: 4.8 MMOL/L (ref 3.5–5.1)
PROT SERPL-MCNC: 7.3 G/DL (ref 6–8.4)
RBC # BLD AUTO: 3.92 M/UL (ref 4.6–6.2)
SODIUM SERPL-SCNC: 141 MMOL/L (ref 136–145)
WBC # BLD AUTO: 8.14 K/UL (ref 3.9–12.7)

## 2025-02-02 PROCEDURE — 25000003 PHARM REV CODE 250: Performed by: INTERNAL MEDICINE

## 2025-02-02 PROCEDURE — 99231 SBSQ HOSP IP/OBS SF/LOW 25: CPT | Mod: ,,, | Performed by: PODIATRIST

## 2025-02-02 PROCEDURE — 21400001 HC TELEMETRY ROOM

## 2025-02-02 PROCEDURE — 80053 COMPREHEN METABOLIC PANEL: CPT | Performed by: PODIATRIST

## 2025-02-02 PROCEDURE — 63600175 PHARM REV CODE 636 W HCPCS: Performed by: INTERNAL MEDICINE

## 2025-02-02 PROCEDURE — 85025 COMPLETE CBC W/AUTO DIFF WBC: CPT | Performed by: PODIATRIST

## 2025-02-02 PROCEDURE — 97597 DBRDMT OPN WND 1ST 20 CM/<: CPT

## 2025-02-02 PROCEDURE — 36415 COLL VENOUS BLD VENIPUNCTURE: CPT | Performed by: PODIATRIST

## 2025-02-02 PROCEDURE — 25000003 PHARM REV CODE 250: Performed by: PODIATRIST

## 2025-02-02 PROCEDURE — 63600175 PHARM REV CODE 636 W HCPCS: Mod: TB,JZ | Performed by: STUDENT IN AN ORGANIZED HEALTH CARE EDUCATION/TRAINING PROGRAM

## 2025-02-02 PROCEDURE — 25000003 PHARM REV CODE 250: Performed by: STUDENT IN AN ORGANIZED HEALTH CARE EDUCATION/TRAINING PROGRAM

## 2025-02-02 RX ADMIN — Medication 6 MG: at 08:02

## 2025-02-02 RX ADMIN — HYDROMORPHONE HYDROCHLORIDE 1 MG: 1 INJECTION, SOLUTION INTRAMUSCULAR; INTRAVENOUS; SUBCUTANEOUS at 01:02

## 2025-02-02 RX ADMIN — HYDROMORPHONE HYDROCHLORIDE 1 MG: 1 INJECTION, SOLUTION INTRAMUSCULAR; INTRAVENOUS; SUBCUTANEOUS at 12:02

## 2025-02-02 RX ADMIN — HYDRALAZINE HYDROCHLORIDE 50 MG: 25 TABLET ORAL at 07:02

## 2025-02-02 RX ADMIN — ACETAMINOPHEN 650 MG: 325 TABLET ORAL at 09:02

## 2025-02-02 RX ADMIN — HYDROMORPHONE HYDROCHLORIDE 1 MG: 1 INJECTION, SOLUTION INTRAMUSCULAR; INTRAVENOUS; SUBCUTANEOUS at 07:02

## 2025-02-02 RX ADMIN — ASPIRIN 81 MG: 81 TABLET, COATED ORAL at 07:02

## 2025-02-02 RX ADMIN — INSULIN ASPART 2 UNITS: 100 INJECTION, SOLUTION INTRAVENOUS; SUBCUTANEOUS at 05:02

## 2025-02-02 RX ADMIN — HYDROMORPHONE HYDROCHLORIDE 1 MG: 1 INJECTION, SOLUTION INTRAMUSCULAR; INTRAVENOUS; SUBCUTANEOUS at 05:02

## 2025-02-02 RX ADMIN — INSULIN GLARGINE 20 UNITS: 100 INJECTION, SOLUTION SUBCUTANEOUS at 07:02

## 2025-02-02 RX ADMIN — INSULIN ASPART 4 UNITS: 100 INJECTION, SOLUTION INTRAVENOUS; SUBCUTANEOUS at 12:02

## 2025-02-02 RX ADMIN — CEFTRIAXONE 2 G: 2 INJECTION, POWDER, FOR SOLUTION INTRAMUSCULAR; INTRAVENOUS at 02:02

## 2025-02-02 RX ADMIN — AMLODIPINE BESYLATE 10 MG: 5 TABLET ORAL at 07:02

## 2025-02-02 RX ADMIN — INSULIN GLARGINE 20 UNITS: 100 INJECTION, SOLUTION SUBCUTANEOUS at 08:02

## 2025-02-02 RX ADMIN — INSULIN ASPART 3 UNITS: 100 INJECTION, SOLUTION INTRAVENOUS; SUBCUTANEOUS at 08:02

## 2025-02-02 RX ADMIN — HYDRALAZINE HYDROCHLORIDE 50 MG: 25 TABLET ORAL at 08:02

## 2025-02-02 RX ADMIN — HYDROMORPHONE HYDROCHLORIDE 1 MG: 1 INJECTION, SOLUTION INTRAMUSCULAR; INTRAVENOUS; SUBCUTANEOUS at 09:02

## 2025-02-02 NOTE — PT/OT/SLP PROGRESS
"[facility]hab [facility] Wound Care Progress Note     Robel Desai  [id number]  2/2/2025   8:30 am- 9:10 Selective Debridment   Diagnosis: L 4th toe infection S/P amputation        Precautions: Standard, Diabetes, Fall, and Weightbearing         Allergies as of 01/28/2025    (No Known Allergies)         Pre-medication:  N/A, no c/o pain     Subjective: Pt agreed to participate in therapy     Patient reports pain level in wound: "minor"     Objective: old dressing removed with minimal serosanguinous drainage noted.  No odor, wound edges slightly macerated.  Clear contents in waste canister post treatment     Treatment:    Clean technique maintained throughout treatment.    Debridement with  Pulsed Lavage and 500ml N/S followed by dressing change consisting of Betadine soaked gauze>Cast padding>Kerlix>ACE     Assessment/Need for Treatment:  Patient tolerated today's treatment without any adverse effects.  Goals remain appropriate.        Plan:  Continue with Plan of Care.     "

## 2025-02-02 NOTE — PLAN OF CARE
Problem: Adult Inpatient Plan of Care  Goal: Plan of Care Review  Outcome: Progressing  Goal: Patient-Specific Goal (Individualized)  Outcome: Progressing  Goal: Absence of Hospital-Acquired Illness or Injury  Outcome: Progressing  Goal: Optimal Comfort and Wellbeing  Outcome: Progressing  Goal: Readiness for Transition of Care  Outcome: Progressing     Problem: Wound  Goal: Optimal Coping  Outcome: Progressing  Goal: Optimal Functional Ability  Outcome: Progressing  Goal: Absence of Infection Signs and Symptoms  Outcome: Progressing  Goal: Improved Oral Intake  Outcome: Progressing  Goal: Optimal Pain Control and Function  Outcome: Progressing  Goal: Skin Health and Integrity  Outcome: Progressing  Goal: Optimal Wound Healing  Outcome: Progressing     Problem: Acute Kidney Injury/Impairment  Goal: Fluid and Electrolyte Balance  Outcome: Progressing  Goal: Improved Oral Intake  Outcome: Progressing  Goal: Effective Renal Function  Outcome: Progressing     Problem: Diabetes Comorbidity  Goal: Blood Glucose Level Within Targeted Range  Outcome: Progressing     Problem: Fall Injury Risk  Goal: Absence of Fall and Fall-Related Injury  Outcome: Progressing

## 2025-02-02 NOTE — NURSING
[facility] - [place]  Nurses Note -- 4 Eyes      2/2/2025       Skin assessed on: Q Shift      [x] No Pressure Injuries Present    []Prevention Measures Documented    [] Yes LDA  for Pressure Injury Previously documented     [] Yes New Pressure Injury Discovered   [] LDA for New Pressure Injury Added      Attending RN:  Shu Hanna RN     Second RN:  NATALIIA Stout

## 2025-02-02 NOTE — NURSING
[facility], [facility] [place]  Nurses Note -- 4 Eyes      2/1/2025       Skin assessed on: Q Shift      [x] No Pressure Injuries Present    [x]Prevention Measures Documented    [] Yes LDA  for Pressure Injury Previously documented     [] Yes New Pressure Injury Discovered   [] LDA for New Pressure Injury Added      Attending RN:  Girish Rubin LPN     Second RN:  Dorie ACOSTA

## 2025-02-02 NOTE — PROGRESS NOTES
[facility] Medicine  Progress Note    Patient Name: Robel Desai  MRN: [id number]  Patient Class: IP- Inpatient   Admission Date: 1/28/2025  Length of Stay: 5 days  Attending Physician: Jay Palacios MD  Primary Care Provider: Ana Maria Bear NP        Subjective     Principal Problem:Osteomyelitis of fourth toe of left foot        HPI:    Robel Desai is a 44 y.o. male who has a past medical history of Diabetes mellitus, CKD2, and Hypertension, presented to the ED with CC of Foot Pain.    Patient has Left foot sweling, erythema and purulent discharge. Noticed it about 4 days ago, and has progressively worsened. On presentation WBC# 14k and ESR >120. XRAY shows soft tissue swelling of the 4th toe left foot with air in the soft tissues of the webspace between the 3rd and 4th toes suggesting soft tissue infection, as well as adjacent bone destruction involving the distal aspect of the proximal phalanx of the 4th toe suggesting osteomyelitis. Given V+Z in ED. Cr 2.9 with ?BL cr 1.4 two years ago. Switched to V+Cefepime for now. Podiatry consulted. Denies CP, AP or SOB.    Overview/Hospital Course:  Patient admit with acute OM with gas formation, taken to surgery, now s/p toe amp. On V+Cefepime currently, awaiting cx and margins.     Interval History: Doing well. No symptomatic complaints. Plan for secondary closure Monday with Podiatry.  Neeta #[id number] used for interaction.    Review of Systems   Constitutional:  Negative for activity change, appetite change, chills, fatigue and fever.   Respiratory:  Negative for cough and shortness of breath.    Cardiovascular:  Negative for chest pain and palpitations.   Gastrointestinal:  Negative for constipation, diarrhea, nausea and vomiting.   Neurological:  Negative for dizziness, syncope and weakness.     Objective:     Vital Signs (Most Recent):  Temp: 97.6 °F (36.4 °C) (02/02/25 1201)  Pulse: (!) 117 (02/02/25 1240)  Resp: 18 (02/02/25  1213)  BP: (!) 155/79 (02/02/25 1201)  SpO2: 95 % (02/02/25 1201) Vital Signs (24h Range):  Temp:  [97.3 °F (36.3 °C)-97.9 °F (36.6 °C)] 97.6 °F (36.4 °C)  Pulse:  [] 117  Resp:  [16-19] 18  SpO2:  [93 %-96 %] 95 %  BP: (113-162)/(69-91) 155/79     Weight: 81.6 kg (180 lb)  Body mass index is 30.9 kg/m².    Intake/Output Summary (Last 24 hours) at 2/2/2025 1335  Last data filed at 2/2/2025 0945  Gross per 24 hour   Intake 480 ml   Output 1850 ml   Net -1370 ml         Physical Exam  Vitals and nursing note reviewed.   Constitutional:       Appearance: He is obese.   HENT:      Head: Normocephalic.   Cardiovascular:      Rate and Rhythm: Normal rate and regular rhythm.      Pulses: Normal pulses.      Heart sounds: Normal heart sounds.   Pulmonary:      Effort: Pulmonary effort is normal. No respiratory distress.      Breath sounds: Normal breath sounds. No wheezing.   Abdominal:      Palpations: Abdomen is soft.   Musculoskeletal:      Comments: S/p amputation of left 4th phalange. Dressing C/D/I    Skin:     General: Skin is warm and dry.   Neurological:      Mental Status: He is alert and oriented to person, place, and time.             Significant Labs: All pertinent labs within the past 24 hours have been reviewed.    Significant Imaging: I have reviewed all pertinent imaging results/findings within the past 24 hours.    Assessment and Plan     * Osteomyelitis of fourth toe of left foot  Apparent infection of left forefoot/toe on exam  XRAY showing:  Soft tissue swelling of the 4th toe left foot  Air in the soft tissues of the webspace between the 3rd and 4th toes suggesting soft tissue infection  Adjacent bone destruction involving the distal aspect of the proximal phalanx of the 4th toe suggesting osteomyelitis  Vanc and cefepime, poor renal fxn. Now deescalated to Rocephin per ID recommendations.   Cultures pending.   Podiatry consulted. S/p amputation of 4th digit proximal phalanx of L foot.   Plan for  secondary closure in OR with Podiatry on Monday     Acute kidney injury superimposed on stage 2 chronic kidney disease  Creatine 2.9 on admit, now 2.4.   IV fluids, recheck   Estimated Creatinine Clearance: 38.3 mL/min (A) (based on SCr of 2.4 mg/dL (H)).  Monitor UOP and serial BMP and adjust therapy as needed.   Renally dose meds.   Avoid nephrotoxic medications and procedures.  BL Cr 1.4 on most recent, 2022  Nephrology referral on discharge    Chronic kidney disease, stage 2, mildly decreased GFR  See MYLA note    Type 2 diabetes mellitus with hyperglycemia, with long-term current use of insulin  Patient states that he takes 10U insulin in AM and 40U in PM but is unclear what type  DA diet, accuchecks, hypoglycemic protocol  SSI only while NPO  start basal bolus if glucoses consistently >180 and eating      Essential hypertension  Goal -180 with hospitalized infection   Hold Home bp meds  PRNs available      VTE Risk Mitigation (From admission, onward)           Ordered     IP VTE LOW RISK PATIENT  Once         01/28/25 0628                    Discharge Planning   FLETCHER:      Code Status: Full Code   Medical Readiness for Discharge Date:   Discharge Plan A: Home with family                        Kyle Santos PA-C  Department of Hospital Medicine   [facility] - Med Surg

## 2025-02-02 NOTE — ASSESSMENT & PLAN NOTE
Apparent infection of left forefoot/toe on exam  XRAY showing:  Soft tissue swelling of the 4th toe left foot  Air in the soft tissues of the webspace between the 3rd and 4th toes suggesting soft tissue infection  Adjacent bone destruction involving the distal aspect of the proximal phalanx of the 4th toe suggesting osteomyelitis  Vanc and cefepime, poor renal fxn. Now deescalated to Rocephin per ID recommendations.   Cultures pending.   Podiatry consulted. S/p amputation of 4th digit proximal phalanx of L foot.   Plan for secondary closure in OR with Podiatry on Monday

## 2025-02-02 NOTE — SUBJECTIVE & OBJECTIVE
Interval History: Doing well. No symptomatic complaints. Plan for secondary closure Monday with Podiatry.  Neeta #[id number] used for interaction.    Review of Systems   Constitutional:  Negative for activity change, appetite change, chills, fatigue and fever.   Respiratory:  Negative for cough and shortness of breath.    Cardiovascular:  Negative for chest pain and palpitations.   Gastrointestinal:  Negative for constipation, diarrhea, nausea and vomiting.   Neurological:  Negative for dizziness, syncope and weakness.     Objective:     Vital Signs (Most Recent):  Temp: 97.6 °F (36.4 °C) (02/02/25 1201)  Pulse: (!) 117 (02/02/25 1240)  Resp: 18 (02/02/25 1213)  BP: (!) 155/79 (02/02/25 1201)  SpO2: 95 % (02/02/25 1201) Vital Signs (24h Range):  Temp:  [97.3 °F (36.3 °C)-97.9 °F (36.6 °C)] 97.6 °F (36.4 °C)  Pulse:  [] 117  Resp:  [16-19] 18  SpO2:  [93 %-96 %] 95 %  BP: (113-162)/(69-91) 155/79     Weight: 81.6 kg (180 lb)  Body mass index is 30.9 kg/m².    Intake/Output Summary (Last 24 hours) at 2/2/2025 1335  Last data filed at 2/2/2025 0945  Gross per 24 hour   Intake 480 ml   Output 1850 ml   Net -1370 ml         Physical Exam  Vitals and nursing note reviewed.   Constitutional:       Appearance: He is obese.   HENT:      Head: Normocephalic.   Cardiovascular:      Rate and Rhythm: Normal rate and regular rhythm.      Pulses: Normal pulses.      Heart sounds: Normal heart sounds.   Pulmonary:      Effort: Pulmonary effort is normal. No respiratory distress.      Breath sounds: Normal breath sounds. No wheezing.   Abdominal:      Palpations: Abdomen is soft.   Musculoskeletal:      Comments: S/p amputation of left 4th phalange. Dressing C/D/I    Skin:     General: Skin is warm and dry.   Neurological:      Mental Status: He is alert and oriented to person, place, and time.             Significant Labs: All pertinent labs within the past 24 hours have been reviewed.    Significant Imaging: I have  reviewed all pertinent imaging results/findings within the past 24 hours.

## 2025-02-03 VITALS
DIASTOLIC BLOOD PRESSURE: 80 MMHG | RESPIRATION RATE: 18 BRPM | TEMPERATURE: 98 F | HEART RATE: 93 BPM | HEIGHT: 64 IN | BODY MASS INDEX: 30.73 KG/M2 | SYSTOLIC BLOOD PRESSURE: 131 MMHG | OXYGEN SATURATION: 97 % | WEIGHT: 180 LBS

## 2025-02-03 PROBLEM — M86.172 OSTEOMYELITIS OF ANKLE OR FOOT, LEFT, ACUTE: Status: RESOLVED | Noted: 2025-02-02 | Resolved: 2025-02-03

## 2025-02-03 LAB
ALBUMIN SERPL BCP-MCNC: 2.8 G/DL (ref 3.5–5.2)
ALP SERPL-CCNC: 92 U/L (ref 40–150)
ALT SERPL W/O P-5'-P-CCNC: 51 U/L (ref 10–44)
ANION GAP SERPL CALC-SCNC: 11 MMOL/L (ref 8–16)
AST SERPL-CCNC: 30 U/L (ref 10–40)
BASOPHILS # BLD AUTO: 0.04 K/UL (ref 0–0.2)
BASOPHILS NFR BLD: 0.6 % (ref 0–1.9)
BILIRUB SERPL-MCNC: 0.2 MG/DL (ref 0.1–1)
BUN SERPL-MCNC: 44 MG/DL (ref 6–20)
CALCIUM SERPL-MCNC: 9.3 MG/DL (ref 8.7–10.5)
CHLORIDE SERPL-SCNC: 110 MMOL/L (ref 95–110)
CO2 SERPL-SCNC: 19 MMOL/L (ref 23–29)
CREAT SERPL-MCNC: 2.4 MG/DL (ref 0.5–1.4)
DIFFERENTIAL METHOD BLD: ABNORMAL
EOSINOPHIL # BLD AUTO: 0.3 K/UL (ref 0–0.5)
EOSINOPHIL NFR BLD: 3.5 % (ref 0–8)
ERYTHROCYTE [DISTWIDTH] IN BLOOD BY AUTOMATED COUNT: 12.6 % (ref 11.5–14.5)
EST. GFR  (NO RACE VARIABLE): 33 ML/MIN/1.73 M^2
GLUCOSE SERPL-MCNC: 191 MG/DL (ref 70–110)
HCT VFR BLD AUTO: 35.1 % (ref 40–54)
HGB BLD-MCNC: 11 G/DL (ref 14–18)
IMM GRANULOCYTES # BLD AUTO: 0.03 K/UL (ref 0–0.04)
IMM GRANULOCYTES NFR BLD AUTO: 0.4 % (ref 0–0.5)
LYMPHOCYTES # BLD AUTO: 1.7 K/UL (ref 1–4.8)
LYMPHOCYTES NFR BLD: 23 % (ref 18–48)
MCH RBC QN AUTO: 27.4 PG (ref 27–31)
MCHC RBC AUTO-ENTMCNC: 31.3 G/DL (ref 32–36)
MCV RBC AUTO: 87 FL (ref 82–98)
MONOCYTES # BLD AUTO: 0.6 K/UL (ref 0.3–1)
MONOCYTES NFR BLD: 8.4 % (ref 4–15)
NEUTROPHILS # BLD AUTO: 4.6 K/UL (ref 1.8–7.7)
NEUTROPHILS NFR BLD: 64.1 % (ref 38–73)
NRBC BLD-RTO: 0 /100 WBC
PLATELET # BLD AUTO: 397 K/UL (ref 150–450)
PMV BLD AUTO: 9.3 FL (ref 9.2–12.9)
POCT GLUCOSE: 157 MG/DL (ref 70–110)
POCT GLUCOSE: 218 MG/DL (ref 70–110)
POTASSIUM SERPL-SCNC: 4.8 MMOL/L (ref 3.5–5.1)
PROT SERPL-MCNC: 7.1 G/DL (ref 6–8.4)
RBC # BLD AUTO: 4.02 M/UL (ref 4.6–6.2)
SODIUM SERPL-SCNC: 140 MMOL/L (ref 136–145)
WBC # BLD AUTO: 7.17 K/UL (ref 3.9–12.7)

## 2025-02-03 PROCEDURE — 25000003 PHARM REV CODE 250: Performed by: PODIATRIST

## 2025-02-03 PROCEDURE — 25000003 PHARM REV CODE 250: Performed by: INTERNAL MEDICINE

## 2025-02-03 PROCEDURE — 97116 GAIT TRAINING THERAPY: CPT

## 2025-02-03 PROCEDURE — 85025 COMPLETE CBC W/AUTO DIFF WBC: CPT | Performed by: PODIATRIST

## 2025-02-03 PROCEDURE — 99900035 HC TECH TIME PER 15 MIN (STAT)

## 2025-02-03 PROCEDURE — 36415 COLL VENOUS BLD VENIPUNCTURE: CPT | Performed by: PODIATRIST

## 2025-02-03 PROCEDURE — 80053 COMPREHEN METABOLIC PANEL: CPT | Performed by: PODIATRIST

## 2025-02-03 PROCEDURE — 99232 SBSQ HOSP IP/OBS MODERATE 35: CPT | Mod: ,,, | Performed by: PODIATRIST

## 2025-02-03 PROCEDURE — 63600175 PHARM REV CODE 636 W HCPCS: Mod: TB,JZ | Performed by: STUDENT IN AN ORGANIZED HEALTH CARE EDUCATION/TRAINING PROGRAM

## 2025-02-03 PROCEDURE — 25000003 PHARM REV CODE 250: Performed by: STUDENT IN AN ORGANIZED HEALTH CARE EDUCATION/TRAINING PROGRAM

## 2025-02-03 PROCEDURE — 94761 N-INVAS EAR/PLS OXIMETRY MLT: CPT

## 2025-02-03 RX ORDER — OXYCODONE AND ACETAMINOPHEN 10; 325 MG/1; MG/1
1 TABLET ORAL EVERY 4 HOURS PRN
Qty: 42 TABLET | Refills: 0 | Status: SHIPPED | OUTPATIENT
Start: 2025-02-03 | End: 2025-02-10

## 2025-02-03 RX ADMIN — AMLODIPINE BESYLATE 10 MG: 5 TABLET ORAL at 09:02

## 2025-02-03 RX ADMIN — HYDROMORPHONE HYDROCHLORIDE 1 MG: 1 INJECTION, SOLUTION INTRAMUSCULAR; INTRAVENOUS; SUBCUTANEOUS at 01:02

## 2025-02-03 RX ADMIN — ASPIRIN 81 MG: 81 TABLET, COATED ORAL at 09:02

## 2025-02-03 RX ADMIN — INSULIN GLARGINE 20 UNITS: 100 INJECTION, SOLUTION SUBCUTANEOUS at 09:02

## 2025-02-03 RX ADMIN — INSULIN ASPART 2 UNITS: 100 INJECTION, SOLUTION INTRAVENOUS; SUBCUTANEOUS at 09:02

## 2025-02-03 RX ADMIN — HYDRALAZINE HYDROCHLORIDE 50 MG: 25 TABLET ORAL at 09:02

## 2025-02-03 RX ADMIN — HYDROMORPHONE HYDROCHLORIDE 1 MG: 1 INJECTION, SOLUTION INTRAMUSCULAR; INTRAVENOUS; SUBCUTANEOUS at 07:02

## 2025-02-03 RX ADMIN — INSULIN ASPART 4 UNITS: 100 INJECTION, SOLUTION INTRAVENOUS; SUBCUTANEOUS at 11:02

## 2025-02-03 NOTE — PLAN OF CARE
Case Management Final Discharge Note      Discharge Disposition: Home Health - referral sent to [facility]. Awaiting response.    New DME ordered / company name: [employer] - approved, CM to deliver to the room    Relevant SDOH / Transition of Care Barriers:  none identified    Person available to provide assistance at home when needed and their contact information:     Scheduled followup appointment: PCP 2/10, Dr. Wilson/podiatry 2/11    Referrals placed: nephrology; outpatient wound clinic - in the event  is unable to accept CM will send referral to [facility].    Transportation: private vehicle        Patient educated on discharge services and updated on DC plan. Bedside RN notified, patient clear to discharge from Case Management Perspective.      02/03/25 1534   Final Note   Assessment Type Final Discharge Note   Anticipated Discharge Disposition Home   Hospital Resources/Appts/Education Provided Appointments scheduled and added to AVS   Post-Acute Status   Coverage [facility] Medicaid   Discharge Delays None known at this time

## 2025-02-03 NOTE — PLAN OF CARE
Problem: Adult Inpatient Plan of Care  Goal: Plan of Care Review  Outcome: Adequate for Care Transition  Goal: Patient-Specific Goal (Individualized)  Outcome: Adequate for Care Transition  Goal: Absence of Hospital-Acquired Illness or Injury  Outcome: Adequate for Care Transition  Goal: Optimal Comfort and Wellbeing  Outcome: Adequate for Care Transition  Goal: Readiness for Transition of Care  Outcome: Adequate for Care Transition     Problem: Wound  Goal: Optimal Coping  Outcome: Adequate for Care Transition  Goal: Optimal Functional Ability  Outcome: Adequate for Care Transition  Goal: Absence of Infection Signs and Symptoms  Outcome: Adequate for Care Transition  Goal: Improved Oral Intake  Outcome: Adequate for Care Transition  Goal: Optimal Pain Control and Function  Outcome: Adequate for Care Transition  Goal: Skin Health and Integrity  Outcome: Adequate for Care Transition  Goal: Optimal Wound Healing  Outcome: Adequate for Care Transition     Problem: Acute Kidney Injury/Impairment  Goal: Fluid and Electrolyte Balance  Outcome: Adequate for Care Transition  Goal: Improved Oral Intake  Outcome: Adequate for Care Transition  Goal: Effective Renal Function  Outcome: Adequate for Care Transition     Problem: Diabetes Comorbidity  Goal: Blood Glucose Level Within Targeted Range  Outcome: Adequate for Care Transition     Problem: Fall Injury Risk  Goal: Absence of Fall and Fall-Related Injury  Outcome: Adequate for Care Transition

## 2025-02-03 NOTE — SUBJECTIVE & OBJECTIVE
Subjective:     Interval History: Patient resting in chair beside bed upon my arrival. He had no complaints of pain and was curious to the date of discharge. Patient denies F/C/N/V.    Follow-up For: Procedure(s) (LRB):  AMPUTATION, TOE 4th TOE (Left)    Post-Operative Day: 5 Days Post-Op    Scheduled Meds:   amLODIPine  10 mg Oral Daily    aspirin  81 mg Oral Daily    cefTRIAXone (Rocephin) IV (PEDS and ADULTS)  2 g Intravenous Q24H    hydrALAZINE  50 mg Oral Q12H    insulin glargine U-100  20 Units Subcutaneous Daily    insulin glargine U-100  20 Units Subcutaneous QHS    melatonin  6 mg Oral Nightly     Continuous Infusions:  PRN Meds:  Current Facility-Administered Medications:     acetaminophen, 650 mg, Oral, Q4H PRN    albuterol-ipratropium, 3 mL, Nebulization, Q6H PRN    dextrose 50%, 12.5 g, Intravenous, PRN    dextrose 50%, 25 g, Intravenous, PRN    diphenhydrAMINE, 25 mg, Oral, Q6H PRN    glucagon (human recombinant), 1 mg, Intramuscular, PRN    glucose, 16 g, Oral, PRN    glucose, 24 g, Oral, PRN    hydrALAZINE, 20 mg, Intravenous, Q4H PRN    hydrALAZINE, 30 mg, Intravenous, Q4H PRN    HYDROmorphone, 1 mg, Intravenous, Q4H PRN    insulin aspart U-100, 0-10 Units, Subcutaneous, QID (AC + HS) PRN    magnesium oxide, 800 mg, Oral, PRN    magnesium oxide, 800 mg, Oral, PRN    naloxone, 0.02 mg, Intravenous, PRN    ondansetron, 8 mg, Oral, Q8H PRN    polyethylene glycol, 17 g, Oral, BID PRN    potassium bicarbonate, 35 mEq, Oral, PRN    potassium bicarbonate, 50 mEq, Oral, PRN    potassium bicarbonate, 60 mEq, Oral, PRN    potassium, sodium phosphates, 2 packet, Oral, PRN    potassium, sodium phosphates, 2 packet, Oral, PRN    potassium, sodium phosphates, 2 packet, Oral, PRN    prochlorperazine, 5 mg, Intravenous, Q6H PRN    senna-docusate 8.6-50 mg, 1 tablet, Oral, Daily PRN    simethicone, 1 tablet, Oral, QID PRN    Review of Systems   Constitutional:  Negative for activity change, appetite change,  chills, fatigue and fever.   Respiratory:  Negative for cough and shortness of breath.    Cardiovascular:  Negative for chest pain and palpitations.   Gastrointestinal:  Negative for constipation, diarrhea, nausea and vomiting.   Neurological:  Negative for dizziness, syncope and weakness.     Objective:     Vital Signs (Most Recent):  Temp: 98.1 °F (36.7 °C) (02/02/25 2012)  Pulse: 96 (02/02/25 2012)  Resp: 20 (02/02/25 2153)  BP: 131/76 (02/02/25 2012)  SpO2: 95 % (02/02/25 2012) Vital Signs (24h Range):  Temp:  [97.4 °F (36.3 °C)-98.4 °F (36.9 °C)] 98.1 °F (36.7 °C)  Pulse:  [] 96  Resp:  [16-20] 20  SpO2:  [93 %-96 %] 95 %  BP: (113-162)/(69-88) 131/76     Weight: 81.6 kg (180 lb)  Body mass index is 30.9 kg/m².    Foot Exam    General  Orientation: alert and oriented to person, place, and time   Affect: appropriate       Left Foot/Ankle      Neurovascular  Dorsalis pedis: 2+  Posterior tibial: 2+  Saphenous nerve sensation: normal  Tibial nerve sensation: normal  Superficial peroneal nerve sensation: normal  Deep peroneal nerve sensation: normal  Sural nerve sensation: normal    Comments  Wound with no necrotic tissue observed, wound base is granular with no malodor and some sanguinous drainage. No erythema and mild edema and warmth noted.     Laboratory:  A1C:   Recent Labs   Lab 01/28/25  0523   HGBA1C 6.5*     BMP:   Recent Labs   Lab 01/31/25  0419 02/01/25  0442 02/02/25  0406   *   < > 129*      < > 141   K 4.7   < > 4.8   *   < > 110   CO2 19*   < > 19*   BUN 44*   < > 41*   CREATININE 2.6*   < > 2.3*   CALCIUM 9.3   < > 9.6   MG 2.2  --   --     < > = values in this interval not displayed.     CBC:   Recent Labs   Lab 02/02/25  0406   WBC 8.14   RBC 3.92*   HGB 11.0*   HCT 34.1*      MCV 87   MCH 28.1   MCHC 32.3     CMP:   Recent Labs   Lab 02/02/25  0406   *   CALCIUM 9.6   ALBUMIN 2.8*   PROT 7.3      K 4.8   CO2 19*      BUN 41*   CREATININE 2.3*    ALKPHOS 93   ALT 28   AST 16   BILITOT 0.2     CRP:   Recent Labs   Lab 01/28/25  0523   CRP 46.3*     ESR:   Recent Labs   Lab 01/28/25 0523   SEDRATE >120*     LFTs:   Recent Labs   Lab 02/02/25  0406   ALT 28   AST 16   ALKPHOS 93   BILITOT 0.2   PROT 7.3   ALBUMIN 2.8*     Wound Cultures:   Recent Labs   Lab 01/28/25  0938 01/28/25 1720   LABAERO Skin pedrito,  no predominant organism Skin pedrito,  no predominant organism  HAEMOPHILUS INFLUENZAE  Moderate  Beta Lactamase positive  *     Microbiology Results (last 7 days)       Procedure Component Value Units Date/Time    Culture, Anaerobe [[id number]] Collected: 01/28/25 1720    Order Status: Completed Specimen: Incision site from Toe, Left Foot Updated: 02/01/25 0740     Anaerobic Culture No anaerobes isolated    Narrative:      Proximal margin left 4th toe    Culture, Anaerobe [[id number]] Collected: 01/28/25 1720    Order Status: Completed Specimen: Incision site from Toe, Left Foot Updated: 02/01/25 0740     Anaerobic Culture No anaerobes isolated    Narrative:      Left 4th toe    Culture, Anaerobic [[id number]] Collected: 01/28/25 0938    Order Status: Completed Specimen: Wound from Toe, Left Foot Updated: 02/01/25 0738     Anaerobic Culture No anaerobes isolated    Blood culture x two cultures. Draw prior to antibiotics. [[id number]] Collected: 01/28/25 0524    Order Status: Completed Specimen: Blood from Peripheral, Antecubital, Left Updated: 02/01/25 0703     Blood Culture, Routine No Growth after 4 days.    Narrative:      Aerobic and anaerobic    Blood culture x two cultures. Draw prior to antibiotics. [[id number]] Collected: 01/28/25 0524    Order Status: Completed Specimen: Blood from Peripheral, Antecubital, Left Updated: 02/01/25 0703     Blood Culture, Routine No Growth after 4 days.    Narrative:      Aerobic and anaerobic    Aerobic culture [[id number]] Collected: 01/28/25 1720    Order Status: Completed Specimen: Incision site from Toe, Left  Foot Updated: 01/31/25 0833     Aerobic Bacterial Culture Skin pedrito,  no predominant organism    Narrative:      Proximal margin left 4th toe    Aerobic culture [[id number]] Collected: 01/28/25 0938    Order Status: Completed Specimen: Wound from Toe, Left Foot Updated: 01/31/25 0827     Aerobic Bacterial Culture Skin pedrito,  no predominant organism    AFB Culture & Smear [[id number]] Collected: 01/29/25 1337    Order Status: Completed Specimen: Toe Updated: 01/30/25 2127     AFB Culture & Smear Culture in progress     AFB CULTURE STAIN No acid fast bacilli seen.    AFB Culture & Smear [[id number]] Collected: 01/28/25 1720    Order Status: Completed Specimen: Incision site from Toe, Left Foot Updated: 01/30/25 2127     AFB Culture & Smear Culture in progress     AFB CULTURE STAIN No acid fast bacilli seen.    Narrative:      Left 4th toe    Aerobic culture [[id number]]  (Abnormal) Collected: 01/28/25 1720    Order Status: Completed Specimen: Incision site from Toe, Left Foot Updated: 01/30/25 1111     Aerobic Bacterial Culture HAEMOPHILUS INFLUENZAE  Moderate  Beta Lactamase positive      Narrative:      Left 4th toe    Gram stain [[id number]] Collected: 01/28/25 1720    Order Status: Completed Specimen: Incision site from Toe, Left Foot Updated: 01/29/25 0744     Gram Stain Result Rare WBC's      No organisms seen    Narrative:      Proximal margin left 4th toe    Gram stain [[id number]] Collected: 01/28/25 1720    Order Status: Completed Specimen: Incision site from Toe, Left Foot Updated: 01/29/25 0743     Gram Stain Result Few WBC's      Many Gram positive cocci    Narrative:      Left 4th toe    Fungus culture [[id number]] Collected: 01/28/25 1720    Order Status: Sent Specimen: Incision site from Toe, Left Foot Updated: 01/28/25 1857    Fungus culture [[id number]] Collected: 01/28/25 1720    Order Status: Sent Specimen: Incision site from Toe, Left Foot Updated: 01/28/25 1848    AFB Culture & Smear  [[id number]] Collected: 01/28/25 1720    Order Status: Canceled Specimen: Incision site from Toe, Left Foot     Gram stain [[id number]] Collected: 01/28/25 0938    Order Status: Completed Specimen: Wound from Toe, Left Foot Updated: 01/28/25 1130     Gram Stain Result Rare WBC's      Rare Gram positive cocci in pairs and chains          Recent Labs   Lab 01/28/25  0811   COLORU Yellow   SPECGRAV 1.020   PHUR 7.0   PROTEINUA 2+*   BACTERIA None   NITRITE Negative   LEUKOCYTESUR Negative   UROBILINOGEN Negative   HYALINECASTS 0     All pertinent labs reviewed within the last 24 hours.    Diagnostic Results:  I have reviewed all pertinent imaging results/findings within the past 24 hours.  US Lower Extremity Arteries Bilateral  Narrative: EXAMINATION:  US LOWER EXTREMITY ARTERIES BILATERAL    CLINICAL HISTORY:  PAD eval;  Osteomyelitis, unspecified    TECHNIQUE:  Bilateral spectral, color and grayscale images of the large arteries of both lower extremities were performed.    COMPARISON:  None    FINDINGS:  Peak systolic velocities were obtained as follows (in cm/sec):    Right common femoral:  122    Right deep femoral:  55    Right superficial femoral proximal: 116    Right superficial femoral mid: 83    Right superficial femoral distal: 90    Right proximal popliteal:  93    Right distal popliteal:  76    Right anterior tibial:  91    Right peroneal:  69    Right posterior tibial:  121    Right dorsalis pedis: 125    Left common femoral:  116    Left deep femoral:  57    Left superficial femoral proximal: 128    Left superficial femoral mid: 119    Left superficial femoral distal: 102    Left proximal popliteal: 80    Left distal popliteal: 75    Left anterior tibial:  100    Left peroneal:   88    Left posterior tibial: 122    Left dorsalis pedis: 128    Triphasic waveforms throughout the lower extremities bilaterally.  Impression: No hemodynamically significant stenosis.    Electronically signed by: Ziyad  MD All  Date:    01/29/2025  Time:    15:51

## 2025-02-03 NOTE — DISCHARGE SUMMARY
[facility]  Discharge Summary      Patient Name: Robel Desai  MRN: [id number]  BRANDON: [id number]  Patient Class: IP- Inpatient  Admission Date: 1/28/2025  Hospital Length of Stay: 6 days  Discharge Date and Time:  02/03/2025 1:53 PM  Attending Physician: Jay Palacios MD   Discharging Provider: Kyle Santos PA-C  Primary Care Provider: Ana Maria Bear NP    Primary Care Team: [facility] KAMINI 3    HPI:     Robel Desai is a 44 y.o. male who has a past medical history of Diabetes mellitus, CKD2, and Hypertension, presented to the ED with CC of Foot Pain.    Patient has Left foot sweling, erythema and purulent discharge. Noticed it about 4 days ago, and has progressively worsened. On presentation WBC# 14k and ESR >120. XRAY shows soft tissue swelling of the 4th toe left foot with air in the soft tissues of the webspace between the 3rd and 4th toes suggesting soft tissue infection, as well as adjacent bone destruction involving the distal aspect of the proximal phalanx of the 4th toe suggesting osteomyelitis. Given V+Z in ED. Cr 2.9 with ?BL cr 1.4 two years ago. Switched to V+Cefepime for now. Podiatry consulted. Denies CP, AP or SOB.    Procedure(s) (LRB):  AMPUTATION, TOE 4th TOE (Left)      Hospital Course:   Admitted for acute OM with gas formation. He was started on Vancomycin and Cefepime. Podiatry was consulted. He underwent an amputation of the left 4th digit. Wound was left open to drain to allow for adequate drainage of infection. He was followed by Podiatry, Infectious Disease and wound care throughout his admission. Patient's initial aerobic wound culture was positive for normal skin pedrito, likely contaminate. Clean margin culture was without growth. No indication for antibiotic coverage on discharge. He remained afebrile without leukocytosis and hemodynamically stable throughout his admission. Patient is hemodynamically stable for discharge with outpatient follow-up  with Podiatry.     Goals of Care Treatment Preferences:  Code Status: Full Code      SDOH Screening:  The patient was screened for utility difficulties, food insecurity, transport difficulties, housing insecurity, and interpersonal safety and there were no concerns identified this admission.     Consults:   Consults (From admission, onward)          Status Ordering Provider     Inpatient consult to Infectious Diseases  Once        Provider:  (Not yet assigned)    Completed NIRMAL PENA     Inpatient consult to Podiatry  Once        Provider:  Ivis Wilson DPM    Completed NIRMAL PENA            * Osteomyelitis of fourth toe of left foot  Apparent infection of left forefoot/toe on exam  XRAY showing:  Soft tissue swelling of the 4th toe left foot  Air in the soft tissues of the webspace between the 3rd and 4th toes suggesting soft tissue infection  Adjacent bone destruction involving the distal aspect of the proximal phalanx of the 4th toe suggesting osteomyelitis  Vanc and cefepime, poor renal fxn. Now deescalated to Rocephin per ID recommendations.   Cultures pending.   Podiatry consulted. S/p amputation of 4th digit proximal phalanx of L foot.   Plan for secondary closure in OR with Podiatry on Monday     Acute kidney injury superimposed on stage 2 chronic kidney disease  Creatine 2.9 on admit, now 2.4.   IV fluids, recheck   Estimated Creatinine Clearance: 38.3 mL/min (A) (based on SCr of 2.4 mg/dL (H)).  Monitor UOP and serial BMP and adjust therapy as needed.   Renally dose meds.   Avoid nephrotoxic medications and procedures.  BL Cr 1.4 on most recent, 2022  Nephrology referral on discharge    Chronic kidney disease, stage 2, mildly decreased GFR  See MYLA note    Type 2 diabetes mellitus with hyperglycemia, with long-term current use of insulin  Patient states that he takes 10U insulin in AM and 40U in PM but is unclear what type  DA diet, accuchecks, hypoglycemic protocol  SSI only while NPO  start basal  "bolus if glucoses consistently >180 and eating      Essential hypertension  Goal -180 with hospitalized infection   Hold Home bp meds  PRNs available      Final Active Diagnoses:    Diagnosis Date Noted POA    PRINCIPAL PROBLEM:  Osteomyelitis of fourth toe of left foot [M86.9] 01/28/2025 Yes    Chronic kidney disease, stage 2, mildly decreased GFR [N18.2] 01/28/2025 Yes    Acute kidney injury superimposed on stage 2 chronic kidney disease [N17.9, N18.2] 01/28/2025 Yes    Gas gangrene of foot [A48.0] 01/28/2025 Yes    Diabetic foot infection [E11.628, L08.9] 01/28/2025 Yes    Essential hypertension [I10] 05/06/2018 Yes    Type 2 diabetes mellitus with hyperglycemia, with long-term current use of insulin [E11.65, Z79.4] 09/17/2012 Not Applicable      Problems Resolved During this Admission:    Diagnosis Date Noted Date Resolved POA    Osteomyelitis of ankle or foot, left, acute [M86.172] 02/02/2025 02/03/2025 Yes       Discharged Condition: good    Disposition: Home or Self Care    Follow Up:   Follow-up Information       Chema, Ana Maria W., NP. Go on 2/10/2025.    Specialty: Family Medicine  Why: Appointment scheduled for 1:00pm  Contact information:  [address] [place] [address] [phone]                           Patient Instructions:      CANE FOR HOME USE     Order Specific Question Answer Comments   Type of Cane: Straight    Height: 5' 4" (1.626 m)    Weight: 81.6 kg (180 lb)    Does patient have medical equipment at home? none    Length of need (1-99 months): 99    Please check all that apply: Patient's condition impairs ambulation.    Please check all that apply: Patient is unable to safely ambulate without equipment.      Ambulatory referral/consult to Nephrology   Standing Status: Future   Referral Priority: Routine Referral Type: Consultation   Referral Reason: Specialty Services Required   Requested Specialty: Nephrology   Number of Visits Requested: 1     Ambulatory referral/consult to " Wound Clinic   Standing Status: Future   Referral Priority: Routine Referral Type: Consultation   Referral Reason: Specialty Services Required   Requested Specialty: Wound Care   Number of Visits Requested: 1     Keep surgical extremity elevated     Notify your health care provider if you experience any of the following:  temperature >100.4     Notify your health care provider if you experience any of the following:  persistent nausea and vomiting or diarrhea     Notify your health care provider if you experience any of the following:  severe uncontrolled pain     Notify your health care provider if you experience any of the following:  redness, tenderness, or signs of infection (pain, swelling, redness, odor or green/yellow discharge around incision site)     Notify your health care provider if you experience any of the following:  difficulty breathing or increased cough     Notify your health care provider if you experience any of the following:  severe persistent headache     Notify your health care provider if you experience any of the following:  worsening rash     Notify your health care provider if you experience any of the following:  persistent dizziness, light-headedness, or visual disturbances     Notify your health care provider if you experience any of the following:  increased confusion or weakness     Weight bearing restrictions (specify):   Order Comments: Non-weight bearing to Left foot, ambulate with boot       Significant Diagnostic Studies: N/A    Pending Diagnostic Studies:       None           Medications:  Reconciled Home Medications:      Medication List        START taking these medications      oxyCODONE-acetaminophen  mg per tablet  Commonly known as: PERCOCET  Take 1 tablet by mouth every 4 (four) hours as needed for Pain.            CONTINUE taking these medications      amLODIPine 5 MG tablet  Commonly known as: NORVASC  [place] lou[place] tableta (5 mg en total) por vía oral  diariamente.  (Take 1 tablet (5 mg total) by mouth once daily.)     aspirin 81 MG EC tablet  Commonly known as: ECOTRIN  Take 81 mg by mouth once daily.     glipiZIDE 5 MG Tr24  Take 5 mg by mouth 2 (two) times a day.     insulin aspart protamine-insulin aspart 100 unit/mL (70-30) Inpn pen  Commonly known as: NovoLOG 70/30  Inject 20 Units into the skin after dinner.     JARDIANCE 10 mg tablet  Generic drug: empagliflozin  Take 10 mg by mouth once daily.     LANTUS SOLOSTAR U-100 INSULIN 100 unit/mL (3 mL) Inpn pen  Generic drug: insulin glargine U-100 (Lantus)  Inject 30 Units into the skin 2 (two) times a day.     * lisinopriL 20 MG tablet  Commonly known as: PRINIVIL,ZESTRIL  Take 1 tablet by mouth once daily.     * lisinopriL 20 MG tablet  Commonly known as: PRINIVIL,ZESTRIL  Take 1 tablet (20 mg total) by mouth once daily.     metFORMIN 1000 MG tablet  Commonly known as: GLUCOPHAGE  Take 1,000 mg by mouth 2 (two) times daily with meals.     NovoLOG Flexpen U-100 Insulin 100 unit/mL (3 mL) Inpn pen  Generic drug: insulin aspart U-100  ADMINISTER 25 UNITS UNDER THE SKIN TWICE DAILY     ondansetron 4 MG tablet  Commonly known as: ZOFRAN  Take 1 tablet (4 mg total) by mouth every 8 (eight) hours as needed for Nausea.           * This list has 2 medication(s) that are the same as other medications prescribed for you. Read the directions carefully, and ask your doctor or other care provider to review them with you.                  Indwelling Lines/Drains at time of discharge:   Lines/Drains/Airways       None                   Time spent on the discharge of patient: 37 minutes         Kyle Santos PA-C  Department of Hospital Medicine  [facility]

## 2025-02-03 NOTE — PLAN OF CARE
Case Management Final Discharge Note      Discharge Disposition: Home    New DME ordered / company name: None    Relevant SDOH / Transition of Care Barriers:  none identified    Person available to provide assistance at home when needed and their contact information:     Scheduled followup appointment: PCP 2/10, Dr. Wilson/podiatry 2/11    Referrals placed:     Transportation: private vehicle        Patient educated on discharge services and updated on DC plan. Bedside RN notified, patient clear to discharge from Case Management Perspective.      02/03/25 1534   Final Note   Assessment Type Final Discharge Note   Anticipated Discharge Disposition Home   Hospital Resources/Appts/Education Provided Appointments scheduled and added to AVS   Post-Acute Status   Coverage [facility] Medicaid   Discharge Delays None known at this time

## 2025-02-03 NOTE — NURSING
Report received, care assumed. Resting in bed quietly. AAO, responsive to verbal stimuli. Denies pain/discomfort. Board updated. Plan of care discussed with patient.[facility] [place]  Nurses Note -- 4 Eyes      2/3/2025       Skin assessed on: Q Shift      [x] No Pressure Injuries Present    []Prevention Measures Documented    [] Yes LDA  for Pressure Injury Previously documented     [] Yes New Pressure Injury Discovered   [] LDA for New Pressure Injury Added      Attending RN:  Pearl Gant RN     Second RN:  NATALIIA Stout

## 2025-02-03 NOTE — NURSING
[facility], [facility] [place]  Nurses Note -- 4 Eyes      2/2/2025       Skin assessed on: Q Shift      [x] No Pressure Injuries Present    [x]Prevention Measures Documented    [] Yes LDA  for Pressure Injury Previously documented     [] Yes New Pressure Injury Discovered   [] LDA for New Pressure Injury Added      Attending RN:  Girish Rubin LPN     Second RN:  Shu ACOSTA

## 2025-02-03 NOTE — PLAN OF CARE
[facility]      HOME HEALTH ORDERS  FACE TO FACE ENCOUNTER    Patient Name: Robel Desai  YOB: 1980    PCP: Ana Maria Bear NP   PCP Address: [address] / [place] [address]  PCP Phone Number: [phone]  PCP Fax: [phone]    Encounter Date: 1/28/25    Admit to Home Health    Diagnoses:  Active Hospital Problems    Diagnosis  POA    *Osteomyelitis of fourth toe of left foot [M86.9]  Yes    Chronic kidney disease, stage 2, mildly decreased GFR [N18.2]  Yes    Acute kidney injury superimposed on stage 2 chronic kidney disease [N17.9, N18.2]  Yes    Gas gangrene of foot [A48.0]  Yes    Diabetic foot infection [E11.628, L08.9]  Yes    Essential hypertension [I10]  Yes    Type 2 diabetes mellitus with hyperglycemia, with long-term current use of insulin [E11.65, Z79.4]  Not Applicable     Formatting of this note might be different from the original.   Last Assessment & Plan:    - A1c 9.3%   - patient states that he takes 10U insulin in AM and 40U in PM but is unclear what type   - ADA diet, accuchecks, hypoglycemic protocol   - glucose at goal with no insulin at this time   - will continue to monitor   - SSI   - start basal bolus if glucoses consistently >180        Resolved Hospital Problems    Diagnosis Date Resolved POA    Osteomyelitis of ankle or foot, left, acute [M86.172] 02/03/2025 Yes       Follow Up Appointments:  Future Appointments   Date Time Provider Department Center   2/11/2025  2:00 PM Ivis Wilson DPM [facility] [place]       Allergies:Review of patient's allergies indicates:  No Known Allergies    Medications: Review discharge medications with patient and family and provide education.    Current Facility-Administered Medications   Medication Dose Route Frequency Provider Last Rate Last Admin    acetaminophen tablet 650 mg  650 mg Oral Q4H PRN Ivis Wilson, DPM   650 mg at 02/02/25 0929    albuterol-ipratropium 2.5 mg-0.5 mg/3 mL nebulizer solution 3 mL  3 mL  Nebulization Q6H PRN Katie, Ivis O., DPM        amLODIPine tablet 10 mg  10 mg Oral Daily ZACK Mckeon MD   10 mg at 02/03/25 0951    aspirin EC tablet 81 mg  81 mg Oral Daily Katie, Ivis O., DPM   81 mg at 02/03/25 0951    cefTRIAXone injection 2 g  2 g Intravenous Q24H Wan Cuellar MD   2 g at 02/02/25 1453    dextrose 50% injection 12.5 g  12.5 g Intravenous PRN Kyle Santos PA-NEETU        dextrose 50% injection 25 g  25 g Intravenous PRN Kyle Santos PA-C        diphenhydrAMINE capsule 25 mg  25 mg Oral Q6H PRN Jay Palacios MD   25 mg at 01/30/25 1510    glucagon (human recombinant) injection 1 mg  1 mg Intramuscular PRN Kyle Santos PA-C        glucose chewable tablet 16 g  16 g Oral PRN Kyle Santos PA-C        glucose chewable tablet 24 g  24 g Oral PRN Kyle Santos PA-C        hydrALAZINE injection 20 mg  20 mg Intravenous Q4H PRN Jay Palacios MD        hydrALAZINE injection 30 mg  30 mg Intravenous Q4H PRN Jay Palacios MD        hydrALAZINE tablet 50 mg  50 mg Oral Q12H Jay Palacios MD   50 mg at 02/03/25 0951    HYDROmorphone injection 1 mg  1 mg Intravenous Q4H PRN Jay Palacios MD   1 mg at 02/03/25 1343    insulin aspart U-100 pen 0-10 Units  0-10 Units Subcutaneous QID (AC + HS) PRN Kyle Santos PA-C   4 Units at 02/03/25 1148    insulin glargine U-100 (Lantus) pen 20 Units  20 Units Subcutaneous Daily Kyle Santos PA-C   20 Units at 02/03/25 0951    insulin glargine U-100 (Lantus) pen 20 Units  20 Units Subcutaneous QHS Kyle Santos PA-C   20 Units at 02/02/25 2020    magnesium oxide tablet 800 mg  800 mg Oral PRN Katie, Ivis O., DPM        magnesium oxide tablet 800 mg  800 mg Oral PRN Katie, Ivis O., DPM        melatonin tablet 6 mg  6 mg Oral Nightly Katie, Ivis O., DPM   6 mg at 02/02/25 2019    naloxone 0.4 mg/mL injection 0.02 mg  0.02 mg Intravenous PRN Katie, Ivis O., DPM        ondansetron disintegrating tablet 8 mg  8 mg Oral Q8H PRN Katie,  Ivis O., DPM        polyethylene glycol packet 17 g  17 g Oral BID PRN Katie, Ivis O., DPM        potassium bicarbonate disintegrating tablet 35 mEq  35 mEq Oral PRN Katie, Ivis O., DPM        potassium bicarbonate disintegrating tablet 50 mEq  50 mEq Oral PRN Katie, Ivis O., DPM        potassium bicarbonate disintegrating tablet 60 mEq  60 mEq Oral PRN Katie, Ivis O., DPM        potassium, sodium phosphates 280-160-250 mg packet 2 packet  2 packet Oral PRN Katie, Ivis O., DPM        potassium, sodium phosphates 280-160-250 mg packet 2 packet  2 packet Oral PRN Katie, Ivis O., DPM        potassium, sodium phosphates 280-160-250 mg packet 2 packet  2 packet Oral PRN Katie, Ivis O., DPM        prochlorperazine injection Soln 5 mg  5 mg Intravenous Q6H PRN Katie, Ivis O., DPM        senna-docusate 8.6-50 mg per tablet 1 tablet  1 tablet Oral Daily PRN Katie, Ivis O., DPM        simethicone chewable tablet 80 mg  1 tablet Oral QID PRN Katie, Ivis O., DPM            Medication List        START taking these medications      oxyCODONE-acetaminophen  mg per tablet  Commonly known as: PERCOCET  Take 1 tablet by mouth every 4 (four) hours as needed for Pain.            CONTINUE taking these medications      amLODIPine 5 MG tablet  Commonly known as: NORVASC  [place] louis tableta (5 mg en total) por vía oral diariamente.  (Take 1 tablet (5 mg total) by mouth once daily.)     aspirin 81 MG EC tablet  Commonly known as: ECOTRIN  Take 81 mg by mouth once daily.     glipiZIDE 5 MG Tr24  Take 5 mg by mouth 2 (two) times a day.     insulin aspart protamine-insulin aspart 100 unit/mL (70-30) Inpn pen  Commonly known as: NovoLOG 70/30  Inject 20 Units into the skin after dinner.     JARDIANCE 10 mg tablet  Generic drug: empagliflozin  Take 10 mg by mouth once daily.     LANTUS SOLOSTAR U-100 INSULIN 100 unit/mL (3 mL) Inpn pen  Generic drug: insulin glargine U-100 (Lantus)  Inject 30 Units into the skin 2 (two) times a day.     *  lisinopriL 20 MG tablet  Commonly known as: PRINIVIL,ZESTRIL  Take 1 tablet by mouth once daily.     * lisinopriL 20 MG tablet  Commonly known as: PRINIVIL,ZESTRIL  Take 1 tablet (20 mg total) by mouth once daily.     metFORMIN 1000 MG tablet  Commonly known as: GLUCOPHAGE  Take 1,000 mg by mouth 2 (two) times daily with meals.     NovoLOG Flexpen U-100 Insulin 100 unit/mL (3 mL) Inpn pen  Generic drug: insulin aspart U-100  ADMINISTER 25 UNITS UNDER THE SKIN TWICE DAILY     ondansetron 4 MG tablet  Commonly known as: ZOFRAN  Take 1 tablet (4 mg total) by mouth every 8 (eight) hours as needed for Nausea.           * This list has 2 medication(s) that are the same as other medications prescribed for you. Read the directions carefully, and ask your doctor or other care provider to review them with you.                    I have seen and examined this patient within the last 30 days. My clinical findings that support the need for the home health skilled services and home bound status are the following:no   Weakness/numbness causing balance and gait disturbance due to Infection making it taxing to leave home.  Requiring assistive device to leave home due to unsteady gait caused by  Infection.     Diet:   cardiac diet and diabetic diet 2000 calorie    Labs:      Referrals/ Consults  Physical Therapy to evaluate and treat. Evaluate for home safety and equipment needs; Establish/upgrade home exercise program. Perform / instruct on therapeutic exercises, gait training, transfer training, and Range of Motion.  Occupational Therapy to evaluate and treat. Evaluate home environment for safety and equipment needs. Perform/Instruct on transfers, ADL training, ROM, and therapeutic exercises.    Activities:   activity as tolerated    Nursing:   Agency to admit patient within 24 hours of hospital discharge unless specified on physician order or at patient request    SN to complete comprehensive assessment including routine vital  signs. Instruct on disease process and s/s of complications to report to MD. Review/verify medication list sent home with the patient at time of discharge  and instruct patient/caregiver as needed. Frequency may be adjusted depending on start of care date.     Skilled nurse to perform up to 3 visits PRN for symptoms related to diagnosis    Notify MD if SBP > 160 or < 90; DBP > 90 or < 50; HR > 120 or < 50; Temp > 101; O2 < 88%; Other:       Ok to schedule additional visits based on staff availability and patient request on consecutive days within the home health episode.    When multiple disciplines ordered:    Start of Care occurs on Sunday - Wednesday schedule remaining discipline evaluations as ordered on separate consecutive days following the start of care.    Thursday SOC -schedule subsequent evaluations Friday and Monday the following week.     Friday - Saturday SOC - schedule subsequent discipline evaluations on consecutive days starting Monday of the following week.    For all post-discharge communication and subsequent orders please contact patient's primary care physician. If unable to reach primary care physician or do not receive response within 30 minutes, please contact  for clinical staff order clarification    Miscellaneous       Home Health Aide:  Physical Therapy Three times weekly and Occupational Therapy Three times weekly    Wound Care Orders  yes:  Surgical Wound:  Location: L 4th toe amputation    Consult ET nurse        Apply the following to wound:    Clean technique maintained throughout treatment.    Debridement with  Pulsed Lavage and 500ml N/S followed by dressing change consisting of Betadine soaked gauze>Cast padding>Kerlix>ACE    I certify that this patient is confined to his home and needs physical therapy and occupational therapy.

## 2025-02-03 NOTE — NURSING
AVS virtually reviewed with patient in its entirety with emphasis on diet, medications, follow-up appointments and reasons to return to the ED or contact the [facility]. Patient also encouraged to utilize their patient portal. Ease and convenience of use reiterated. Education complete and patient voiced understanding. All questions answered. Discharge teaching complete.  [id number]

## 2025-02-03 NOTE — DISCHARGE INSTRUCTIONS
.Our goal at [facility] is to always give you outstanding care and exceptional service. You may receive a survey by mail, text or e-mail in the next 7-10 days from Hernandez Roberto and our leadership team asking about the care you received with us. The survey should only take 5-10 minutes to complete and is very important to us.     Your feedback provides us with a way to recognize our staff who work tirelessly to provide the best care! Also, your responses help us learn how to improve when your experience was below our aspiration of excellence. We WILL use your feedback to continue making improvements to help us provide the highest quality care. We keep your personal information and feedback confidential. We appreciate your time completing this survey and can't wait to hear from you!!!     We want you to leave us today feeling like you can DEFINITELY recommend us to others! We look forward to your continued care with us! Thanks so much for choosing [facility] for your healthcare needs!

## 2025-02-03 NOTE — NURSING
Patient discharged home at this time. No distress noted. Left unit via wheelchair accompanied by transport personnel and family member.[facility], [facility]  Nurses Note -- 4 Eyes      2/3/2025       Skin assessed on: Discharge      [x] No Pressure Injuries Present    []Prevention Measures Documented    [] Yes LDA  for Pressure Injury Previously documented     [] Yes New Pressure Injury Discovered   [] LDA for New Pressure Injury Added      Attending RN:  Pearl Gant RN     Second RN:  NATALIIA Sterling

## 2025-02-03 NOTE — ASSESSMENT & PLAN NOTE
Assessment:  44 year old male with MRI confirmed OM of the 4th digit of the left foot. Gas present in the soft tissues. Now s/p removal of the 4th digit left open to allow for drainage. Cultures pending, WBC wnl.     Plan:  -left foot packed with vashe soaked gauze, clean gauze, cast padding, and ACE bandage  -wound care orders in place  -Patient PWB to heel in boot  -Podiatry will follow

## 2025-02-03 NOTE — PT/OT/SLP PROGRESS
Physical Therapy Treatment    Patient Name:  Robel Desai   MRN:  [id number]    Recommendations:     Discharge Recommendations: No Therapy Indicated  Discharge Equipment Recommendations: cane, straight  Barriers to discharge:  Wound closure?    Assessment:     Robel Desai is a 44 y.o. male admitted with a medical diagnosis of Osteomyelitis of fourth toe of left foot.  He presents with the following impairments/functional limitations: impaired balance, impaired skin .    Rehab Prognosis: Good and Fair; patient would benefit from acute skilled PT services to address these deficits and reach maximum level of function.    Recent Surgery: Procedure(s) (LRB):  AMPUTATION, TOE 4th TOE (Left) 6 Days Post-Op    Plan:     During this hospitalization, patient to be seen 3 x/week to address the identified rehab impairments via gait training, therapeutic activities, therapeutic exercises, neuromuscular re-education and progress toward the following goals:    Plan of Care Expires:  02/13/25    Subjective     Chief Complaint: No c/o  Patient/Family Comments/goals: Pt agreeable to treatmetn  Pain/Comfort:  Pain Rating 1: 0/10      Objective:     Communicated with PA prior to session.  Patient found up in chair with  (N/A) upon PT entry to room.     General Precautions: Standard, fall  Orthopedic Precautions:  (Partial Heel WB in cam walker boot)  Braces:  (Cam walker boot)  Respiratory Status: Room air     Functional Mobility:  Transfers:     Sit to Stand:  modified independence with no AD  Gait: Gait training with RW, SPC, and AC's with Supervision/Mod I and VC/TC for sequencing, AD management/safety, and PHWB L LE in cam boot approx 100' total  Balance: Good-/Fair+      AM-PAC 6 CLICK MOBILITY  Turning over in bed (including adjusting bedclothes, sheets and blankets)?: 4  Sitting down on and standing up from a chair with arms (e.g., wheelchair, bedside commode, etc.): 4  Moving from lying on back to sitting on the side of the  bed?: 4  Moving to and from a bed to a chair (including a wheelchair)?: 3  Need to walk in hospital room?: 3  Climbing 3-5 steps with a railing?: 3  Basic Mobility Total Score: 21       Treatment & Education:  Educated pt on donning/doffing cam boot.  Pt requires Min A and VC to don cam walker boot.  Pt ascended/descended 4 steps with SPC, HR R, CGA/SBA, and VC/TC for sequencing, PHWB L LE, and device management/safety    Patient left up in chair with all lines intact and call button in reach..    GOALS:   Multidisciplinary Problems       Physical Therapy Goals          Problem: Physical Therapy    Goal Priority Disciplines Outcome Interventions   Physical Therapy Goal     PT, PT/OT Progressing    Description: Goals to be met by: 25     Patient will increase functional independence with mobility by performin. Sit to stand transfer with Modified Door  2. Bed to chair transfer with Modified Door using Rolling Walker  3. Gait  x 10-15 feet with Modified Door using Rolling Walker.   4. Lower extremity exercise program x10 reps per handout, with independence                         DME Justifications:   Robel's mobility limitation cannot be sufficiently resolved by the use of a cane. His functional mobility deficit can be sufficiently resolved with the use of a Single Point Cane.   Patient's mobility limitation significantly impairs their ability to participate in one of more activities of daily living.  The use of a SPC will significantly improve the patient's ability to participate in MRADLS and the patient will use it on regular basis in the home as his weight bearing status is Partial Heel Weight bearing in cam walker boot.    Time Tracking:     PT Received On: 25  PT Start Time: 1420     PT Stop Time: 1443  PT Total Time (min): 23 min     Billable Minutes: Gait Training 23    Treatment Type: Treatment  PT/PTA: PT     Number of PTA visits since last PT visit: 0      02/03/2025

## 2025-02-03 NOTE — PROGRESS NOTES
[facility] Surg  Podiatry  Progress Note    Patient Name: Robel Desai  MRN: [id number]  Admission Date: 1/28/2025  Hospital Length of Stay: 5 days  Attending Physician: Jay Palacios MD  Primary Care Provider: Ana Maria Bear NP     Subjective:     Interval History: Patient resting in chair beside bed upon my arrival. He had no complaints of pain and was curious to the date of discharge. Patient denies F/C/N/V.    Follow-up For: Procedure(s) (LRB):  AMPUTATION, TOE 4th TOE (Left)    Post-Operative Day: 5 Days Post-Op    Scheduled Meds:   amLODIPine  10 mg Oral Daily    aspirin  81 mg Oral Daily    cefTRIAXone (Rocephin) IV (PEDS and ADULTS)  2 g Intravenous Q24H    hydrALAZINE  50 mg Oral Q12H    insulin glargine U-100  20 Units Subcutaneous Daily    insulin glargine U-100  20 Units Subcutaneous QHS    melatonin  6 mg Oral Nightly     Continuous Infusions:  PRN Meds:  Current Facility-Administered Medications:     acetaminophen, 650 mg, Oral, Q4H PRN    albuterol-ipratropium, 3 mL, Nebulization, Q6H PRN    dextrose 50%, 12.5 g, Intravenous, PRN    dextrose 50%, 25 g, Intravenous, PRN    diphenhydrAMINE, 25 mg, Oral, Q6H PRN    glucagon (human recombinant), 1 mg, Intramuscular, PRN    glucose, 16 g, Oral, PRN    glucose, 24 g, Oral, PRN    hydrALAZINE, 20 mg, Intravenous, Q4H PRN    hydrALAZINE, 30 mg, Intravenous, Q4H PRN    HYDROmorphone, 1 mg, Intravenous, Q4H PRN    insulin aspart U-100, 0-10 Units, Subcutaneous, QID (AC + HS) PRN    magnesium oxide, 800 mg, Oral, PRN    magnesium oxide, 800 mg, Oral, PRN    naloxone, 0.02 mg, Intravenous, PRN    ondansetron, 8 mg, Oral, Q8H PRN    polyethylene glycol, 17 g, Oral, BID PRN    potassium bicarbonate, 35 mEq, Oral, PRN    potassium bicarbonate, 50 mEq, Oral, PRN    potassium bicarbonate, 60 mEq, Oral, PRN    potassium, sodium phosphates, 2 packet, Oral, PRN    potassium, sodium phosphates, 2 packet, Oral, PRN    potassium, sodium phosphates, 2 packet, Oral,  PRN    prochlorperazine, 5 mg, Intravenous, Q6H PRN    senna-docusate 8.6-50 mg, 1 tablet, Oral, Daily PRN    simethicone, 1 tablet, Oral, QID PRN    Review of Systems   Constitutional:  Negative for activity change, appetite change, chills, fatigue and fever.   Respiratory:  Negative for cough and shortness of breath.    Cardiovascular:  Negative for chest pain and palpitations.   Gastrointestinal:  Negative for constipation, diarrhea, nausea and vomiting.   Neurological:  Negative for dizziness, syncope and weakness.     Objective:     Vital Signs (Most Recent):  Temp: 98.1 °F (36.7 °C) (02/02/25 2012)  Pulse: 96 (02/02/25 2012)  Resp: 20 (02/02/25 2153)  BP: 131/76 (02/02/25 2012)  SpO2: 95 % (02/02/25 2012) Vital Signs (24h Range):  Temp:  [97.4 °F (36.3 °C)-98.4 °F (36.9 °C)] 98.1 °F (36.7 °C)  Pulse:  [] 96  Resp:  [16-20] 20  SpO2:  [93 %-96 %] 95 %  BP: (113-162)/(69-88) 131/76     Weight: 81.6 kg (180 lb)  Body mass index is 30.9 kg/m².    Foot Exam    General  Orientation: alert and oriented to person, place, and time   Affect: appropriate       Left Foot/Ankle      Neurovascular  Dorsalis pedis: 2+  Posterior tibial: 2+  Saphenous nerve sensation: normal  Tibial nerve sensation: normal  Superficial peroneal nerve sensation: normal  Deep peroneal nerve sensation: normal  Sural nerve sensation: normal    Comments  Wound with no necrotic tissue observed, wound base is granular with no malodor and some sanguinous drainage. No erythema and mild edema and warmth noted.     Laboratory:  A1C:   Recent Labs   Lab 01/28/25  0523   HGBA1C 6.5*     BMP:   Recent Labs   Lab 01/31/25  0419 02/01/25  0442 02/02/25  0406   *   < > 129*      < > 141   K 4.7   < > 4.8   *   < > 110   CO2 19*   < > 19*   BUN 44*   < > 41*   CREATININE 2.6*   < > 2.3*   CALCIUM 9.3   < > 9.6   MG 2.2  --   --     < > = values in this interval not displayed.     CBC:   Recent Labs   Lab 02/02/25  0406   WBC 8.14   RBC  3.92*   HGB 11.0*   HCT 34.1*      MCV 87   MCH 28.1   MCHC 32.3     CMP:   Recent Labs   Lab 02/02/25  0406   *   CALCIUM 9.6   ALBUMIN 2.8*   PROT 7.3      K 4.8   CO2 19*      BUN 41*   CREATININE 2.3*   ALKPHOS 93   ALT 28   AST 16   BILITOT 0.2     CRP:   Recent Labs   Lab 01/28/25 0523   CRP 46.3*     ESR:   Recent Labs   Lab 01/28/25 0523   SEDRATE >120*     LFTs:   Recent Labs   Lab 02/02/25  0406   ALT 28   AST 16   ALKPHOS 93   BILITOT 0.2   PROT 7.3   ALBUMIN 2.8*     Wound Cultures:   Recent Labs   Lab 01/28/25  0938 01/28/25  1720   LABAERO Skin pedrito,  no predominant organism Skin pedrito,  no predominant organism  HAEMOPHILUS INFLUENZAE  Moderate  Beta Lactamase positive  *     Microbiology Results (last 7 days)       Procedure Component Value Units Date/Time    Culture, Anaerobe [[id number]] Collected: 01/28/25 1720    Order Status: Completed Specimen: Incision site from Toe, Left Foot Updated: 02/01/25 0740     Anaerobic Culture No anaerobes isolated    Narrative:      Proximal margin left 4th toe    Culture, Anaerobe [[id number]] Collected: 01/28/25 1720    Order Status: Completed Specimen: Incision site from Toe, Left Foot Updated: 02/01/25 0740     Anaerobic Culture No anaerobes isolated    Narrative:      Left 4th toe    Culture, Anaerobic [[id number]] Collected: 01/28/25 0938    Order Status: Completed Specimen: Wound from Toe, Left Foot Updated: 02/01/25 0738     Anaerobic Culture No anaerobes isolated    Blood culture x two cultures. Draw prior to antibiotics. [[id number]] Collected: 01/28/25 0524    Order Status: Completed Specimen: Blood from Peripheral, Antecubital, Left Updated: 02/01/25 0703     Blood Culture, Routine No Growth after 4 days.    Narrative:      Aerobic and anaerobic    Blood culture x two cultures. Draw prior to antibiotics. [[id number]] Collected: 01/28/25 0524    Order Status: Completed Specimen: Blood from Peripheral, Antecubital, Left  Updated: 02/01/25 0703     Blood Culture, Routine No Growth after 4 days.    Narrative:      Aerobic and anaerobic    Aerobic culture [[id number]] Collected: 01/28/25 1720    Order Status: Completed Specimen: Incision site from Toe, Left Foot Updated: 01/31/25 0833     Aerobic Bacterial Culture Skin pedrito,  no predominant organism    Narrative:      Proximal margin left 4th toe    Aerobic culture [[id number]] Collected: 01/28/25 0938    Order Status: Completed Specimen: Wound from Toe, Left Foot Updated: 01/31/25 0827     Aerobic Bacterial Culture Skin pedrito,  no predominant organism    AFB Culture & Smear [[id number]] Collected: 01/29/25 1337    Order Status: Completed Specimen: Toe Updated: 01/30/25 2127     AFB Culture & Smear Culture in progress     AFB CULTURE STAIN No acid fast bacilli seen.    AFB Culture & Smear [[id number]] Collected: 01/28/25 1720    Order Status: Completed Specimen: Incision site from Toe, Left Foot Updated: 01/30/25 2127     AFB Culture & Smear Culture in progress     AFB CULTURE STAIN No acid fast bacilli seen.    Narrative:      Left 4th toe    Aerobic culture [[id number]]  (Abnormal) Collected: 01/28/25 1720    Order Status: Completed Specimen: Incision site from Toe, Left Foot Updated: 01/30/25 1111     Aerobic Bacterial Culture HAEMOPHILUS INFLUENZAE  Moderate  Beta Lactamase positive      Narrative:      Left 4th toe    Gram stain [[id number]] Collected: 01/28/25 1720    Order Status: Completed Specimen: Incision site from Toe, Left Foot Updated: 01/29/25 0744     Gram Stain Result Rare WBC's      No organisms seen    Narrative:      Proximal margin left 4th toe    Gram stain [[id number]] Collected: 01/28/25 1720    Order Status: Completed Specimen: Incision site from Toe, Left Foot Updated: 01/29/25 0743     Gram Stain Result Few WBC's      Many Gram positive cocci    Narrative:      Left 4th toe    Fungus culture [[id number]] Collected: 01/28/25 1720    Order Status: Sent  Specimen: Incision site from Toe, Left Foot Updated: 01/28/25 1857    Fungus culture [[id number]] Collected: 01/28/25 1720    Order Status: Sent Specimen: Incision site from Toe, Left Foot Updated: 01/28/25 1848    AFB Culture & Smear [[id number]] Collected: 01/28/25 1720    Order Status: Canceled Specimen: Incision site from Toe, Left Foot     Gram stain [[id number]] Collected: 01/28/25 0938    Order Status: Completed Specimen: Wound from Toe, Left Foot Updated: 01/28/25 1130     Gram Stain Result Rare WBC's      Rare Gram positive cocci in pairs and chains          Recent Labs   Lab 01/28/25  0811   COLORU Yellow   SPECGRAV 1.020   PHUR 7.0   PROTEINUA 2+*   BACTERIA None   NITRITE Negative   LEUKOCYTESUR Negative   UROBILINOGEN Negative   HYALINECASTS 0     All pertinent labs reviewed within the last 24 hours.    Diagnostic Results:  I have reviewed all pertinent imaging results/findings within the past 24 hours.  US Lower Extremity Arteries Bilateral  Narrative: EXAMINATION:  US LOWER EXTREMITY ARTERIES BILATERAL    CLINICAL HISTORY:  PAD eval;  Osteomyelitis, unspecified    TECHNIQUE:  Bilateral spectral, color and grayscale images of the large arteries of both lower extremities were performed.    COMPARISON:  None    FINDINGS:  Peak systolic velocities were obtained as follows (in cm/sec):    Right common femoral:  122    Right deep femoral:  55    Right superficial femoral proximal: 116    Right superficial femoral mid: 83    Right superficial femoral distal: 90    Right proximal popliteal:  93    Right distal popliteal:  76    Right anterior tibial:  91    Right peroneal:  69    Right posterior tibial:  121    Right dorsalis pedis: 125    Left common femoral:  116    Left deep femoral:  57    Left superficial femoral proximal: 128    Left superficial femoral mid: 119    Left superficial femoral distal: 102    Left proximal popliteal: 80    Left distal popliteal: 75    Left anterior tibial:  100    Left  peroneal:   88    Left posterior tibial: 122    Left dorsalis pedis: 128    Triphasic waveforms throughout the lower extremities bilaterally.  Impression: No hemodynamically significant stenosis.    Electronically signed by: Ziyad Carrizales MD  Date:    01/29/2025  Time:    15:51        Assessment/Plan:     ID  Osteomyelitis of ankle or foot, left, acute  Assessment:  44 year old male with MRI confirmed OM of the 4th digit of the left foot. Gas present in the soft tissues. Now s/p removal of the 4th digit left open to allow for drainage. Cultures pending, WBC wnl.     Plan:  -left foot packed with vashe soaked gauze, clean gauze, cast padding, and ACE bandage  -wound care orders in place  -Patient PWB to heel in boot  -Podiatry will follow        Andrew Chatterjee MD  Podiatry  [facility] - Med S[facility]

## 2025-02-04 NOTE — PROGRESS NOTES
[facility] Emergency Dept  Podiatry  Progress Note    Patient Name: Robel Desai  MRN: [id number]  Admission Date: 1/28/2025  Hospital Length of Stay: 6 days  Attending Physician: No att. providers found  Primary Care Provider: Ana Maria Bear NP         History of Present Illness: Robel Desai is a 44 y.o. male with  has a past medical history of Chronic kidney disease, stage 2, mildly decreased GFR, Diabetes mellitus, and Hypertension. Consulted to the podiatry for evaluation and treatment of  left foot infection   Location: lateral forefoot/toes Onset of the symptoms was several days ago. Precipitating event:  Patient relates that he had had some increased edema to his legs and feet and he wore a tight shoe which he believes resulted in irritation and infection of the toe .  History of injury: no Current symptoms include: swelling and pain . Signs of infection fever, chills, and fatigue   Symptoms have gradually worsened.     The entirety of our encounter and consent was done with the assistance of phone  Charu #[id number]    1/29/25: Patient seen bedside. Denies pedal pain. Reports itching all over body. Patient reports similar issue in the past after anesthesia.     01/30/2025 Patient seen at bedside resting comfortably.  He relates that he is no longer experiencing pain to the foot.  He relates that the itchiness that he was experiencing has also dissipated.  He has no new pedal complaints.  He is more so concerned about his progress and next steps    1/31/25: Patient seen bedside. Bandage intact left foot    2/3/25: Patient seen bedside. Bandage intact left foot.  Ever # [id number] utilized for duration of encounter.   Shoe gear: Slip-on shoes    Chief Complaint   Patient presents with    Abscess     To top of left foot X 3-4 days, no drainage noted   Per H&P On presentation WBC# 14k and ESR >120. XRAY shows soft tissue swelling of the 4th toe left foot with air in the soft tissues  of the webspace between the 3rd and 4th toes suggesting soft tissue infection, as well as adjacent bone destruction involving the distal aspect of the proximal phalanx of the 4th toe suggesting osteomyelitis. Given V+Z in ED. Switched to V+Cefepime for now        Scheduled Meds:   amLODIPine  10 mg Oral Daily    aspirin  81 mg Oral Daily    cefTRIAXone (Rocephin) IV (PEDS and ADULTS)  2 g Intravenous Q24H    hydrALAZINE  50 mg Oral Q12H    insulin glargine U-100  20 Units Subcutaneous Daily    insulin glargine U-100  20 Units Subcutaneous QHS    melatonin  6 mg Oral Nightly     Continuous Infusions:      PRN Meds:  Current Facility-Administered Medications:     acetaminophen, 650 mg, Oral, Q4H PRN    albuterol-ipratropium, 3 mL, Nebulization, Q6H PRN    dextrose 50%, 12.5 g, Intravenous, PRN    dextrose 50%, 25 g, Intravenous, PRN    diphenhydrAMINE, 25 mg, Oral, Q6H PRN    glucagon (human recombinant), 1 mg, Intramuscular, PRN    glucose, 16 g, Oral, PRN    glucose, 24 g, Oral, PRN    hydrALAZINE, 20 mg, Intravenous, Q4H PRN    hydrALAZINE, 30 mg, Intravenous, Q4H PRN    HYDROmorphone, 1 mg, Intravenous, Q4H PRN    insulin aspart U-100, 0-10 Units, Subcutaneous, QID (AC + HS) PRN    magnesium oxide, 800 mg, Oral, PRN    magnesium oxide, 800 mg, Oral, PRN    naloxone, 0.02 mg, Intravenous, PRN    ondansetron, 8 mg, Oral, Q8H PRN    polyethylene glycol, 17 g, Oral, BID PRN    potassium bicarbonate, 35 mEq, Oral, PRN    potassium bicarbonate, 50 mEq, Oral, PRN    potassium bicarbonate, 60 mEq, Oral, PRN    potassium, sodium phosphates, 2 packet, Oral, PRN    potassium, sodium phosphates, 2 packet, Oral, PRN    potassium, sodium phosphates, 2 packet, Oral, PRN    prochlorperazine, 5 mg, Intravenous, Q6H PRN    senna-docusate 8.6-50 mg, 1 tablet, Oral, Daily PRN    simethicone, 1 tablet, Oral, QID PRN    Review of patient's allergies indicates:  No Known Allergies     Past Medical History:   Diagnosis Date    Chronic  kidney disease, stage 2, mildly decreased GFR 01/28/2025    Diabetes mellitus     Hypertension      Past Surgical History:   Procedure Laterality Date    APPENDECTOMY      TOE AMPUTATION Left 1/28/2025    Procedure: AMPUTATION, TOE 4th TOE;  Surgeon: Ivis Wilson DPM;  Location: [facility];  Service: Podiatry;  Laterality: Left;       Family History       Problem Relation (Age of Onset)    Diabetes Mother          Tobacco Use    Smoking status: Never     Passive exposure: Never    Smokeless tobacco: Never   Substance and Sexual Activity    Alcohol use: Yes     Comment: occasionally     Drug use: No    Sexual activity: Not on file     Review of Systems   Constitutional:  Negative for appetite change, chills, fatigue and fever.   Respiratory:  Negative for cough and shortness of breath.    Cardiovascular:  Negative for chest pain.   Gastrointestinal:  Negative for diarrhea, nausea and vomiting.   Musculoskeletal:  Positive for myalgias.   Skin:  Positive for color change and wound.   Neurological:  Negative for weakness.        + paresthesia      Objective:     Vital Signs (Most Recent):  Temp: 98 °F (36.7 °C) (02/03/25 1121)  Pulse: 93 (02/03/25 1121)  Resp: 18 (02/03/25 1343)  BP: 131/80 (02/03/25 1121)  SpO2: 97 % (02/03/25 1121) Vital Signs (24h Range):  Temp:  [98 °F (36.7 °C)-98.2 °F (36.8 °C)] 98 °F (36.7 °C)  Pulse:  [] 93  Resp:  [18-20] 18  SpO2:  [95 %-98 %] 97 %  BP: (128-151)/(68-84) 131/80     Weight: 81.6 kg (180 lb)  Body mass index is 30.9 kg/m².    Physical Exam  Vitals and nursing note reviewed.   Constitutional:       General: He is not in acute distress.     Appearance: He is well-developed. He is not toxic-appearing or diaphoretic.      Comments: alert and oriented x 3.    Cardiovascular:      Pulses:           Dorsalis pedis pulses are 2+ on the right side and 2+ on the left side.        Posterior tibial pulses are 2+ on the right side and 2+ on the left side.   Pulmonary:      Effort: No  respiratory distress.   Musculoskeletal:         General: No deformity.      Right ankle: No tenderness. No lateral malleolus, medial malleolus, AITF ligament, CF ligament or posterior TF ligament tenderness.      Right Achilles Tendon: No defects. Paula's test negative.      Left ankle: No tenderness. No lateral malleolus, medial malleolus, AITF ligament, CF ligament or posterior TF ligament tenderness.      Left Achilles Tendon: No defects. Paula's test negative.      Right foot: No tenderness or bony tenderness.      Left foot: Swelling and tenderness (4th ray) present. No bony tenderness.      Comments: Muscle strength is 5/5 in all groups bilaterally.           Feet:      Left foot:      Skin integrity: Ulcer (see below) present.   Lymphadenopathy:      Comments: No lymphatic streaking     Skin:     General: Skin is warm and dry.      Coloration: Skin is not pale.      Findings: No rash.      Nails: There is no clubbing.   Neurological:      Sensory: No sensory deficit.      Motor: No atrophy.      Comments: Light touch present     Psychiatric:         Attention and Perception: He is attentive.         Mood and Affect: Mood is not anxious. Affect is not inappropriate.         Speech: He is communicative. Speech is not slurred.         Behavior: Behavior is not combative.       2/3/25:  Wound stable red granular base no purulent drainage noted.          1/31/25:        01/30/2025 1/29/25:  Epic unable to load image.   Slight dusky appearance s/p  left 4th toe amputation. No purulent drainage noted.     01/28/2024  Ulcer location: medial 4th digit, left foot  Signs of infection: edema, erythema, pain, malodor, active purulence  Drainage: Sero-Sanguinous and Purulent  Purulence: Yes  Crepitus/fluctuance: Yes  Periwound: Reddened, Macerated  Base: Fibrotic slough  Depth: cartilage  Probe to bone: Yes                Laboratory:  A1C:   Recent Labs   Lab 01/28/25  0523   HGBA1C 6.5*     CBC:   Recent  Labs   Lab 02/03/25 0440   WBC 7.17   RBC 4.02*   HGB 11.0*   HCT 35.1*      MCV 87   MCH 27.4   MCHC 31.3*     CMP:   Recent Labs   Lab 02/03/25 0440   *   CALCIUM 9.3   ALBUMIN 2.8*   PROT 7.1      K 4.8   CO2 19*      BUN 44*   CREATININE 2.4*   ALKPHOS 92   ALT 51*   AST 30   BILITOT 0.2     CRP:   Recent Labs   Lab 01/28/25 0523   CRP 46.3*     ESR:   Recent Labs   Lab 01/28/25 0523   SEDRATE >120*     Microbiology Results (last 7 days)       Procedure Component Value Units Date/Time    Culture, Anaerobe [[id number]] Collected: 01/28/25 1720    Order Status: Completed Specimen: Incision site from Toe, Left Foot Updated: 02/01/25 0740     Anaerobic Culture No anaerobes isolated    Narrative:      Proximal margin left 4th toe    Culture, Anaerobe [[id number]] Collected: 01/28/25 1720    Order Status: Completed Specimen: Incision site from Toe, Left Foot Updated: 02/01/25 0740     Anaerobic Culture No anaerobes isolated    Narrative:      Left 4th toe    Culture, Anaerobic [[id number]] Collected: 01/28/25 0938    Order Status: Completed Specimen: Wound from Toe, Left Foot Updated: 02/01/25 0738     Anaerobic Culture No anaerobes isolated    Blood culture x two cultures. Draw prior to antibiotics. [[id number]] Collected: 01/28/25 0524    Order Status: Completed Specimen: Blood from Peripheral, Antecubital, Left Updated: 02/01/25 0703     Blood Culture, Routine No Growth after 4 days.    Narrative:      Aerobic and anaerobic    Blood culture x two cultures. Draw prior to antibiotics. [[id number]] Collected: 01/28/25 0524    Order Status: Completed Specimen: Blood from Peripheral, Antecubital, Left Updated: 02/01/25 0703     Blood Culture, Routine No Growth after 4 days.    Narrative:      Aerobic and anaerobic    Aerobic culture [[id number]] Collected: 01/28/25 1720    Order Status: Completed Specimen: Incision site from Toe, Left Foot Updated: 01/31/25 0833     Aerobic Bacterial  Culture Skin pedrito,  no predominant organism    Narrative:      Proximal margin left 4th toe    Aerobic culture [[id number]] Collected: 01/28/25 0938    Order Status: Completed Specimen: Wound from Toe, Left Foot Updated: 01/31/25 0827     Aerobic Bacterial Culture Skin pedrito,  no predominant organism    AFB Culture & Smear [[id number]] Collected: 01/29/25 1337    Order Status: Completed Specimen: Toe Updated: 01/30/25 2127     AFB Culture & Smear Culture in progress     AFB CULTURE STAIN No acid fast bacilli seen.    AFB Culture & Smear [[id number]] Collected: 01/28/25 1720    Order Status: Completed Specimen: Incision site from Toe, Left Foot Updated: 01/30/25 2127     AFB Culture & Smear Culture in progress     AFB CULTURE STAIN No acid fast bacilli seen.    Narrative:      Left 4th toe    Aerobic culture [[id number]]  (Abnormal) Collected: 01/28/25 1720    Order Status: Completed Specimen: Incision site from Toe, Left Foot Updated: 01/30/25 1111     Aerobic Bacterial Culture HAEMOPHILUS INFLUENZAE  Moderate  Beta Lactamase positive      Narrative:      Left 4th toe    Gram stain [[id number]] Collected: 01/28/25 1720    Order Status: Completed Specimen: Incision site from Toe, Left Foot Updated: 01/29/25 0744     Gram Stain Result Rare WBC's      No organisms seen    Narrative:      Proximal margin left 4th toe    Gram stain [[id number]] Collected: 01/28/25 1720    Order Status: Completed Specimen: Incision site from Toe, Left Foot Updated: 01/29/25 0743     Gram Stain Result Few WBC's      Many Gram positive cocci    Narrative:      Left 4th toe    Fungus culture [[id number]] Collected: 01/28/25 1720    Order Status: Sent Specimen: Incision site from Toe, Left Foot Updated: 01/28/25 1857    Fungus culture [[id number]] Collected: 01/28/25 1720    Order Status: Sent Specimen: Incision site from Toe, Left Foot Updated: 01/28/25 1848    AFB Culture & Smear [[id number]] Collected: 01/28/25 1720    Order Status:  Canceled Specimen: Incision site from Toe, Left Foot     Gram stain [[id number]] Collected: 01/28/25 0938    Order Status: Completed Specimen: Wound from Toe, Left Foot Updated: 01/28/25 1130     Gram Stain Result Rare WBC's      Rare Gram positive cocci in pairs and chains            Diagnostic Results:  Imaging Results              MRI Forefoot WO Contrast LT (Final result)  Result time 01/28/25 13:48:38      Final result by Mina Barron MD (01/28/25 13:48:38)                   Impression:      Findings concerning for acute osteomyelitis of the 4th toe phalanges.  Surrounding soft tissue induration and swelling of the digit with accompanying susceptibility gas focus.  Findings which can be seen in the setting of nearby open wound or gas-forming infection.    Degree of STIR edema involving the forefoot musculature as detailed.  Appearance is nonspecific though could relate to sequela of infectious or non-infectious myositis.    Subcutaneous edema along the dorsal foot with differential considerations to include noninfectious inflammatory change or cellulitis.      Electronically signed by: Mina Barron  Date:    01/28/2025  Time:    13:48               Narrative:    EXAMINATION:  MRI FOREFOOT WO CONTRAST LT    CLINICAL HISTORY:  Osteomyelitis, foot;Podiatry request for OM penetration/depth;    TECHNIQUE:  Multiplanar, multisequence MR imaging of the left forefoot without the use of intravenous gadolinium IV contrast.    COMPARISON:  Left foot radiograph dated 01/20/2025    FINDINGS:  There is abnormal T1 marrow signal hypointensity throughout the 4th toe proximal and middle phalanges as well as portion of the distal phalanx.  There is corresponding T2 STIR marrow edema throughout the 3rd toe phalanges with surrounding soft tissue induration and swelling of the digit and accompanying susceptibility focus (series 3, image 19).  Additional STIR edema of nearby intrinsic foot musculature extending along  the metatarsal shaft level.  Remaining osseous T1 marrow signal of the forefoot appears maintained.  There is modest STIR edema at the 3rd and 4th metatarsal heads, possibly reactive.  Generalized subcutaneous edema tracking extending along the dorsal foot.  No discrete drainable collection.                                        X-Ray Foot Complete Left (Final result)  Result time 01/28/25 06:02:03      Final result by Danie Bartholomew MD (01/28/25 06:02:03)                   Impression:      Soft tissue swelling of the 4th toe left foot with some air in the soft tissues of the webspace between the 3rd and 4th toes suggesting soft tissue infection.  There is some adjacent bone destruction involving the distal aspect of the proximal phalanx of the 4th toe suggesting osteomyelitis.  Further evaluation can be obtained with MRI.    This report was flagged in Epic as abnormal.      Electronically signed by: Danie Bartholomew MD  Date:    01/28/2025  Time:    06:02               Narrative:    EXAMINATION:  XR FOOT COMPLETE 3 VIEW LEFT    CLINICAL HISTORY:  .  Type 2 diabetes mellitus with other skin complications    TECHNIQUE:  AP, lateral and oblique views of the left foot were performed.    COMPARISON:  None    FINDINGS:  No fracture or dislocation.  Lisfranc articulation is congruent.  Cartilage spaces are maintained.  Vascular calcifications present.  Soft tissue swelling of the 4th toe left foot with some air in the soft tissues of the webspace between the 3rd and 4th toes suggesting soft tissue infection.  There is some adjacent bone destruction involving the distal aspect of the proximal phalanx of the 4th toe suggesting osteomyelitis.  No radiopaque foreign body.                                      Assessment/Plan:     Active Diagnoses:    Diagnosis Date Noted POA    PRINCIPAL PROBLEM:  Osteomyelitis of fourth toe of left foot [M86.9] 01/28/2025 Yes    Chronic kidney disease, stage 2, mildly decreased GFR  [N18.2] 01/28/2025 Yes    Acute kidney injury superimposed on stage 2 chronic kidney disease [N17.9, N18.2] 01/28/2025 Yes    Gas gangrene of foot [A48.0] 01/28/2025 Yes    Diabetic foot infection [E11.628, L08.9] 01/28/2025 Yes    Essential hypertension [I10] 05/06/2018 Yes    Type 2 diabetes mellitus with hyperglycemia, with long-term current use of insulin [E11.65, Z79.4] 09/17/2012 Not Applicable      Problems Resolved During this Admission:    Diagnosis Date Noted Date Resolved POA    Osteomyelitis of ankle or foot, left, acute [M86.172] 02/02/2025 02/03/2025 Yes     Education about the prevention of limb loss.    Discussed wound healing cycle, skin integrity, ways to care for skin.Counseled patient on the effects of biomechanical pressure, edema, and high blood glucose on healing. He verbalizes understanding that it can increase the chances of delayed healing and this prolonged exposure leads to infection or progression of infection which subsequently can result in loss of limb.    Site stable, improving.  Granular wound base  PT pulse lavage pending.     Ok for discharge from podiatry standpoint    F/u [facility] - Dr. Wilson within one week of discharge.     DSD applied    Abx per ID.       Short-term goals include maintaining good offloading and minimizing bioburden, promoting granulation and epithelialization to healing.  Long-term goals include keeping the wound healed by good offloading and medical management under the direction of internist.     We will continue to follow and work in partnership with treatment team on how to best treat patient concern and diagnosis.      Thank you for your consult.     Joan Cerna DPM  Podiatry  [facility] - Emergency Dept

## 2025-02-04 NOTE — PLAN OF CARE
Post discharge follow up 2/4/25 1400:    [facility] was unable to accept pt due to not being in network with pt's insurance until 4/1/25.   CM sent referrals to [facility] and [facility], but neither were able to accept.    CM sent referrals for wound care clinic and nephrology to [facility].

## 2025-02-10 LAB
FUNGUS SPEC CULT: NORMAL
FUNGUS SPEC CULT: NORMAL

## 2025-02-11 ENCOUNTER — OFFICE VISIT (OUTPATIENT)
Dept: PODIATRY | Facility: CLINIC | Age: 45
End: 2025-02-11
Payer: MEDICAID

## 2025-02-11 VITALS — HEIGHT: 64 IN | BODY MASS INDEX: 30.71 KG/M2 | WEIGHT: 179.88 LBS

## 2025-02-11 DIAGNOSIS — Z89.422 S/P AMPUTATION OF LESSER TOE, LEFT: ICD-10-CM

## 2025-02-11 DIAGNOSIS — Z09 FOLLOW-UP EXAMINATION FOLLOWING SURGERY: ICD-10-CM

## 2025-02-11 DIAGNOSIS — E11.49 TYPE II DIABETES MELLITUS WITH NEUROLOGICAL MANIFESTATIONS: Primary | ICD-10-CM

## 2025-02-11 PROCEDURE — 99213 OFFICE O/P EST LOW 20 MIN: CPT | Mod: PBBFAC,PO | Performed by: PODIATRIST

## 2025-02-11 PROCEDURE — 1111F DSCHRG MED/CURRENT MED MERGE: CPT | Mod: CPTII,,, | Performed by: PODIATRIST

## 2025-02-11 PROCEDURE — 99215 OFFICE O/P EST HI 40 MIN: CPT | Mod: S$PBB,,, | Performed by: PODIATRIST

## 2025-02-11 PROCEDURE — 3044F HG A1C LEVEL LT 7.0%: CPT | Mod: CPTII,,, | Performed by: PODIATRIST

## 2025-02-11 PROCEDURE — 1160F RVW MEDS BY RX/DR IN RCRD: CPT | Mod: CPTII,,, | Performed by: PODIATRIST

## 2025-02-11 PROCEDURE — 99999 PR PBB SHADOW E&M-EST. PATIENT-LVL III: CPT | Mod: PBBFAC,,, | Performed by: PODIATRIST

## 2025-02-11 PROCEDURE — 3008F BODY MASS INDEX DOCD: CPT | Mod: CPTII,,, | Performed by: PODIATRIST

## 2025-02-11 PROCEDURE — 1159F MED LIST DOCD IN RCRD: CPT | Mod: CPTII,,, | Performed by: PODIATRIST

## 2025-02-11 NOTE — PROGRESS NOTES
[facility] Podiatry  Progress Note    Patient Name: Robel Desai  MRN: [id number]  Discharge Date: 02/03/2025  Procedure: AMPUTATION, TOE 4th TOE (Left)    Procedure Date: 01/28/2025  Primary Care Provider: Ana Maria Bear NP         History of Present Illness: Robel Desai is a 44 y.o. male with  has a past medical history of Chronic kidney disease, stage 2, mildly decreased GFR, Diabetes mellitus, and Hypertension. Consulted to the podiatry for evaluation and treatment of  left foot infection   Location: lateral forefoot/toes Onset of the symptoms was several days ago. Precipitating event:  Patient relates that he had had some increased edema to his legs and feet and he wore a tight shoe which he believes resulted in irritation and infection of the toe .  History of injury: no Current symptoms include: swelling and pain . Signs of infection fever, chills, and fatigue   Symptoms have gradually worsened.     The entirety of our encounter and consent was done with the assistance of phone  Charu #[id number]    1/29/25: Patient seen bedside. Denies pedal pain. Reports itching all over body. Patient reports similar issue in the past after anesthesia.     01/30/2025 Patient seen at bedside resting comfortably.  He relates that he is no longer experiencing pain to the foot.  He relates that the itchiness that he was experiencing has also dissipated.  He has no new pedal complaints.  He is more so concerned about his progress and next steps    1/31/25: Patient seen bedside. Bandage intact left foot    2/3/25: Patient seen bedside. Bandage intact left foot.  Ever # [id number] utilized for duration of encounter.     02/11/2025 patient presents to clinic following hospital discharge for follow-up of left partial 4th ray amputation, open.  Since discharge he has been [place] up [place] [facility] [place] [facility] and has an upcoming appointment on 02/13/2025 he has a lot of questions about how I feel as far as his  progress and any opinion I may have on how wound care has been taking care of his foot.  He relates pain with use of CAM boot. This entire encounter was conducted in the presence video .    Shoe gear: flip flop right, small CAM boot Left          Patient Active Problem List   Diagnosis    Cellulitis of finger of right hand    Cellulitis of head except face    DM with Hyperglycemia    Essential hypertension    Cellulitis of right hand    Class 2 obesity in adult    Hyperkalemia    Type 2 diabetes mellitus with hyperglycemia, with long-term current use of insulin    Proliferative diabetic retinopathy    Hyperlipidemia    Osteomyelitis of fourth toe of left foot    Chronic kidney disease, stage 2, mildly decreased GFR    Acute kidney injury superimposed on stage 2 chronic kidney disease    Gas gangrene of foot    Diabetic foot infection       Current Outpatient Medications on File Prior to Visit   Medication Sig Dispense Refill    aspirin (ECOTRIN) 81 MG EC tablet Take 81 mg by mouth once daily.      glipiZIDE 5 MG TR24 Take 5 mg by mouth 2 (two) times a day.      insulin aspart protamine-insulin aspart (NOVOLOG 70/30) 100 unit/mL (70-30) InPn pen Inject 20 Units into the skin after dinner.      insulin aspart U-100 (NOVOLOG FLEXPEN U-100 INSULIN) 100 unit/mL (3 mL) InPn pen ADMINISTER 25 UNITS UNDER THE SKIN TWICE DAILY      JARDIANCE 10 mg tablet Take 10 mg by mouth once daily.      LANTUS SOLOSTAR U-100 INSULIN 100 unit/mL (3 mL) InPn pen Inject 30 Units into the skin 2 (two) times a day.      lisinopriL (PRINIVIL,ZESTRIL) 20 MG tablet Take 1 tablet (20 mg total) by mouth once daily. 30 tablet 3    lisinopriL (PRINIVIL,ZESTRIL) 20 MG tablet Take 1 tablet by mouth once daily.      metFORMIN (GLUCOPHAGE) 1000 MG tablet Take 1,000 mg by mouth 2 (two) times daily with meals.      ondansetron (ZOFRAN) 4 MG tablet Take 1 tablet (4 mg total) by mouth every 8 (eight) hours as needed for Nausea. 12 tablet 0     amLODIPine (NORVASC) 5 MG tablet Take 1 tablet (5 mg total) by mouth once daily. 30 tablet 11     No current facility-administered medications on file prior to visit.       Review of patient's allergies indicates:  No Known Allergies    Past Surgical History:   Procedure Laterality Date    APPENDECTOMY      TOE AMPUTATION Left 1/28/2025    Procedure: AMPUTATION, TOE 4th TOE;  Surgeon: Ivis Wilson DPM;  Location: [facility];  Service: Podiatry;  Laterality: Left;       Family History   Problem Relation Name Age of Onset    Diabetes Mother         Social History     Socioeconomic History    Marital status: Single   Tobacco Use    Smoking status: Never     Passive exposure: Never    Smokeless tobacco: Never   Substance and Sexual Activity    Alcohol use: Yes     Comment: occasionally     Drug use: No   Social History Narrative    ** Merged History Encounter **          Social Drivers of Health     Financial Resource Strain: Low Risk  (2/2/2025)    Overall Financial Resource Strain (CARDIA)     Difficulty of Paying Living Expenses: Not very hard   Food Insecurity: No Food Insecurity (2/2/2025)    Hunger Vital Sign     Worried About Running Out of Food in the Last Year: Never true     Ran Out of Food in the Last Year: Never true   Transportation Needs: No Transportation Needs (2/2/2025)    TRANSPORTATION NEEDS     Transportation : No   Stress: No Stress Concern Present (2/2/2025)    [employer] [place] [employer] - Occupational Stress Questionnaire     Feeling of Stress : Only a little   Housing Stability: Unknown (2/2/2025)    Housing Stability Vital Sign     Unable to Pay for Housing in the Last Year: No     Homeless in the Last Year: No           Review of patient's allergies indicates:  No Known Allergies     Past Medical History:   Diagnosis Date    Chronic kidney disease, stage 2, mildly decreased GFR 01/28/2025    Diabetes mellitus     Hypertension      Past Surgical History:   Procedure Laterality Date  "   APPENDECTOMY      TOE AMPUTATION Left 1/28/2025    Procedure: AMPUTATION, TOE 4th TOE;  Surgeon: Ivis Wilson DPM;  Location: [facility] OR;  Service: Podiatry;  Laterality: Left;       Family History       Problem Relation (Age of Onset)    Diabetes Mother          Tobacco Use    Smoking status: Never     Passive exposure: Never    Smokeless tobacco: Never   Substance and Sexual Activity    Alcohol use: Yes     Comment: occasionally     Drug use: No    Sexual activity: Not on file     Review of Systems   Constitutional:  Negative for appetite change, chills, fatigue and fever.   Respiratory:  Negative for cough and shortness of breath.    Cardiovascular:  Negative for chest pain.   Gastrointestinal:  Negative for diarrhea, nausea and vomiting.   Musculoskeletal:  Positive for myalgias.   Skin:  Positive for color change and wound.   Neurological:  Negative for weakness.        + paresthesia      Objective:     Vitals:    02/11/25 1425   Weight: 81.6 kg (179 lb 14.3 oz)   Height: 5' 4" (1.626 m)       Physical Exam  Vitals and nursing note reviewed.   Constitutional:       General: He is not in acute distress.     Appearance: He is well-developed. He is not toxic-appearing or diaphoretic.      Comments: alert and oriented x 3.    Cardiovascular:      Pulses:           Dorsalis pedis pulses are 2+ on the right side and 2+ on the left side.        Posterior tibial pulses are 2+ on the right side and 2+ on the left side.   Pulmonary:      Effort: No respiratory distress.   Musculoskeletal:         General: No deformity.      Right ankle: No tenderness. No lateral malleolus, medial malleolus, AITF ligament, CF ligament or posterior TF ligament tenderness.      Right Achilles Tendon: No defects. Paula's test negative.      Left ankle: No tenderness. No lateral malleolus, medial malleolus, AITF ligament, CF ligament or posterior TF ligament tenderness.      Left Achilles Tendon: No defects. Paula's test negative.    "   Right foot: No tenderness or bony tenderness.      Left foot: Swelling and tenderness (4th ray) present. No bony tenderness.      Comments: Muscle strength is 5/5 in all groups bilaterally.           Feet:      Left foot:      Skin integrity: Ulcer (see below) present.   Lymphadenopathy:      Comments: No lymphatic streaking     Skin:     General: Skin is warm and dry.      Coloration: Skin is not pale.      Findings: No rash.      Nails: There is no clubbing.   Neurological:      Sensory: Sensory deficit present.      Motor: No atrophy.      Comments: Light touch present     Psychiatric:         Attention and Perception: He is attentive.         Mood and Affect: Mood is not anxious. Affect is not inappropriate.         Speech: He is communicative. Speech is not slurred.         Behavior: Behavior is not combative.       02/11/2025              2/3/25:  Wound stable red granular base no purulent drainage noted.          1/31/25:        01/30/2025 1/29/25:  Epic unable to load image.   Slight dusky appearance s/p  left 4th toe amputation. No purulent drainage noted.     01/28/2024  Ulcer location: medial 4th digit, left foot  Signs of infection: edema, erythema, pain, malodor, active purulence  Drainage: Sero-Sanguinous and Purulent  Purulence: Yes  Crepitus/fluctuance: Yes  Periwound: Reddened, Macerated  Base: Fibrotic slough  Depth: cartilage  Probe to bone: Yes                  Hemoglobin A1C   Date Value Ref Range Status   01/28/2025 6.5 (H) 4.0 - 5.6 % Final     Comment:     ADA Screening Guidelines:  5.7-6.4%  Consistent with prediabetes  >or=6.5%  Consistent with diabetes    High levels of fetal hemoglobin interfere with the HbA1C  assay. Heterozygous hemoglobin variants (HbS, HgC, etc)do  not significantly interfere with this assay.   However, presence of multiple variants may affect accuracy.     09/13/2022 11.0 (H) 4.0 - 5.6 % Final     Comment:     ADA Screening Guidelines:  5.7-6.4%   Consistent with prediabetes  >or=6.5%  Consistent with diabetes    High levels of fetal hemoglobin interfere with the HbA1C  assay. Heterozygous hemoglobin variants (HbS, HgC, etc)do  not significantly interfere with this assay.   However, presence of multiple variants may affect accuracy.     09/13/2022 10.6 (H) 4.5 - 5.7 % Final   05/06/2018 9.3 (H) 4.0 - 5.6 % Final     Comment:     According to ADA guidelines, hemoglobin A1c <7.0% represents  optimal control in non-pregnant diabetic patients. Different  metrics may apply to specific patient populations.   Standards of Medical Care in Diabetes-2016.  For the purpose of screening for the presence of diabetes:  <5.7%     Consistent with the absence of diabetes  5.7-6.4%  Consistent with increasing risk for diabetes   (prediabetes)  >or=6.5%  Consistent with diabetes  Currently, no consensus exists for use of hemoglobin A1c  for diagnosis of diabetes for children.  This Hemoglobin A1c assay has significant interference with fetal   hemoglobin   (HbF). The results are invalid for patients with abnormal amounts of   HbF,   including those with known Hereditary Persistence   of Fetal Hemoglobin. Heterozygous hemoglobin variants (HbAS, HbAC,   HbAD, HbAE, HbA2) do not significantly interfere with this assay;   however, presence of multiple variants in a sample may impact the %   interference.       Hemoglobin A1c   Date Value Ref Range Status   04/15/2021 8.7 (H) <5.7 % of total Hgb Final     Comment:     For someone without known diabetes, a hemoglobin A1c  value of 6.5% or greater indicates that they may have   diabetes and this should be confirmed with a follow-up   test.    For someone with known diabetes, a value <7% indicates   that their diabetes is well controlled and a value   greater than or equal to 7% indicates suboptimal   control. A1c targets should be individualized based on   duration of diabetes, age, comorbid conditions, and   other  considerations.    Currently, no consensus exists regarding use of  hemoglobin A1c for diagnosis of diabetes for children.           Assessment/Plan:     1. Type II diabetes mellitus with neurological manifestations        2. Follow-up examination following surgery        3. S/P amputation of lesser toe, left            Education about the prevention of limb loss.    Discussed wound healing cycle, skin integrity, ways to care for skin.Counseled patient on the effects of biomechanical pressure, edema, and high blood glucose on healing. He verbalizes understanding that it can increase the chances of delayed healing and this prolonged exposure leads to infection or progression of infection which subsequently can result in loss of limb.    Adequate vitamin supplementation, protein intake, and hydration - discussed with patient    The wound is cleansed of foreign material as much as possible and the base inspected for bone or abscess. Site stable, improving.  Fibrogranular wound base    Dressings: mimi, silvercel  Offloading: alonzo compression.  Darco dispensed, patient to heel WB    Warned against excessive ambulation    Follow-up: wound clinic but should call [facility] immediately if any signs of infection, such as fever, chills, sweats, increased redness or pain.    Short-term goals include maintaining good offloading and minimizing bioburden, promoting granulation and epithelialization to healing.  Long-term goals include keeping the wound healed by good offloading and medical management under the direction of internist.    Shoe inspection. Diabetic Foot Education. Patient reminded of the importance of good nutrition and blood sugar control to help prevent podiatric complications of diabetes. Patient instructed on proper foot hygeine. We discussed wearing proper shoe gear, daily foot inspections, never walking without protective shoe gear, never putting sharp instruments to feet.         I spent a total of 44 minutes  on the day of the visit.This includes face to face time and non-face to face time preparing to see the patient (eg, review of tests), obtaining and/or reviewing separately obtained history, documenting clinical information in the electronic or other health record, independently interpreting results and communicating results to the patient/family/caregiver, or care coordinator.    Procedures

## 2025-02-20 ENCOUNTER — HOSPITAL ENCOUNTER (OUTPATIENT)
Dept: WOUND CARE | Facility: HOSPITAL | Age: 45
Discharge: HOME | End: 2025-02-20
Attending: FAMILY MEDICINE
Payer: MEDICAID

## 2025-02-20 VITALS
TEMPERATURE: 98 F | SYSTOLIC BLOOD PRESSURE: 157 MMHG | HEART RATE: 94 BPM | RESPIRATION RATE: 18 BRPM | DIASTOLIC BLOOD PRESSURE: 86 MMHG

## 2025-02-20 DIAGNOSIS — M86.9 OSTEOMYELITIS OF FOURTH TOE OF LEFT FOOT: ICD-10-CM

## 2025-02-20 PROCEDURE — 99203 OFFICE O/P NEW LOW 30 MIN: CPT | Performed by: FAMILY MEDICINE

## 2025-02-20 NOTE — PROGRESS NOTES
[facility]                Progress Note    Subjective:       Patient ID: Robel Desai is a 44 y.o. male.    Chief Complaint: Wound Check    Admit wound care visit for Left foot 4 th metatarsal wound (amputation site). Patient reports left foot  4th toe amputation was 1/28/25. Patient was receiving wound care at another facility prior to coming to this clinic  today. Patient agreed to  cancel wound care clinic appointment that was scheduled for today at [facility] to be seen here at [facility]. Patient ambulated unaided  to [facility] with sister and  nurse at side. GATITO utilized thorugh out wound care visit with [place] speaking video  ([id number]).  Patient denies fever, chills, nausea, vomiting or diarrhea at present. Patient c/o  pain to wound bed 1/10 at present.Patient wearing darco shoe to affected foot. Patient dressing clean and dry to left foot. Dressing removed & wound cleaned by nurse. Dressing was discarded. Topical Lidocaine 5% ointment applied to wound bed and allowed to absorb for 15 minutes for patient comfort. Wound bed with pink/maroon/tan dry fibrin cap upon arrival. Fibrin cap removed, two small pink/moist areas. Dressing order received; applied per MD orders. Applied Aquacel to wound bed,then covered with mextra and secured with kerlix. Patient will return to [facility] in 1 week. AVS printed and given to patient. Patient was given print out of all future scheduled appointments.         Review of Systems      Objective:        Physical Exam    Vitals:    02/20/25 1106   BP: (!) 157/86   Pulse: 94   Resp: 18   Temp: 97.6 °F (36.4 °C)       Assessment:           ICD-10-CM ICD-9-CM   1. Osteomyelitis of fourth toe of left foot  M86.9 730.27            Wound 02/20/25 1058 Other (comment) Left Metatarsal (Active)   02/20/25 1058 Metatarsal   Present on Original Admission: Y   Primary Wound Type: Other   Side: Left   Orientation:    Wound Approximate  Age at First Assessment (Weeks):    Wound Number:    Is this injury device related?:    Incision Type:    Closure Method:    Wound Description (Comments):    Type:    Additional Comments:    Ankle-Brachial Index:    Pulses:    Removal Indication and Assessment:    Wound Outcome:    Wound Image    02/20/25 1154   Dressing Appearance Intact;Clean 02/20/25 1154   Drainage Amount None 02/20/25 1154   Appearance Pink;Maroon;Fibrin;Dry;Tan 02/20/25 1154   Red (%), Wound Tissue Color 90 % 02/20/25 1154   Yellow (%), Wound Tissue Color 10 % 02/20/25 1154   Periwound Area Dry;Satellite lesion 02/20/25 1154   Wound Edges Irregular 02/20/25 1154   Wound Length (cm) 0.4 cm 02/20/25 1154   Wound Width (cm) 0.1 cm 02/20/25 1154   Wound Depth (cm) 0.01 cm 02/20/25 1154   Wound Volume (cm^3) 0 cm^3 02/20/25 1154   Wound Surface Area (cm^2) 0.03 cm^2 02/20/25 1154   Tunneling (depth (cm)/location) 0 02/20/25 1154   Undermining (depth (cm)/location) 0 02/20/25 1154   Care Cleansed with:;Antimicrobial agent;Sterile normal saline 02/20/25 1154   Dressing Applied;Other (comment) 02/20/25 1154   Off Loading Off loading shoe 02/20/25 1154   Dressing Change Due 02/27/25 02/20/25 1154           Plan:              Tissue pathology and/or culture taken     [] Yes      [x] No  Sharp debridement performed                   [] Yes       [x] No  Labs ordered     [] Yes       [x] No  Imaging ordered    [] Yes      [x] No    Orders Placed This Encounter   Procedures    Ambulatory referral/consult to Wound Clinic     Standing Status:   Standing     Number of Occurrences:   1     Referral Priority:   Routine     Referral Type:   Consultation     Referral Reason:   Specialty Services Required     Requested Specialty:   Wound Care     Number of Visits Requested:   1    Change dressing     Wound Dressing Orders    Dressing change frequency once a week and prn Nurse Visits    Amputation of Left 4th Metatarsal  Remove old dressing  Cleanse or irrigate  "with: Normal Saline  Protect periwound with:  Maintain dry periwound  Primary dressing: Aquacel   Secondary dressing: Mextra ( size used: 5"x5"), secure with kerlix    Return to clinic in 1 week        Follow up in about 1 week (around 2/27/2025) for Wound Care.       "

## 2025-02-27 ENCOUNTER — HOSPITAL ENCOUNTER (OUTPATIENT)
Dept: WOUND CARE | Facility: HOSPITAL | Age: 45
Discharge: HOME | End: 2025-02-27
Attending: FAMILY MEDICINE
Payer: MEDICAID

## 2025-02-27 VITALS
RESPIRATION RATE: 18 BRPM | SYSTOLIC BLOOD PRESSURE: 159 MMHG | DIASTOLIC BLOOD PRESSURE: 94 MMHG | HEART RATE: 97 BPM | TEMPERATURE: 97 F

## 2025-02-27 DIAGNOSIS — M86.9 OSTEOMYELITIS OF FOURTH TOE OF LEFT FOOT: Primary | ICD-10-CM

## 2025-02-27 PROCEDURE — 99213 OFFICE O/P EST LOW 20 MIN: CPT | Performed by: FAMILY MEDICINE

## 2025-02-27 PROCEDURE — 99213 OFFICE O/P EST LOW 20 MIN: CPT | Mod: ,,, | Performed by: FAMILY MEDICINE

## 2025-02-27 NOTE — PROGRESS NOTES
[facility]                Progress Note    Subjective:       Patient ID: Robel Desai is a 44 y.o. male.    Chief Complaint: Wound Check    Follow Up wound care visit for left 4th toe amputation. Patient ambulated unaided  to [facility] with nurse at side. GATITO utilized throughout wound care visit with [place] speaking  ( [id number]).Patient denies fever, chills, nausea, vomiting or diarrhea at present. Patient denies pain/discomfort to wound bed at present. Patient reports not having any issues with dressing since last visit. Patient reports dressing was changed two days by diabetes doctor who wanted to see his wound. Dressing appears without strike through drainage. Dressing removed & wound cleaned by nurse. Dressing was discarded. Dressing was clean and dry. Wound closed. Applied bordered dressing to site. Patient discharged from wound care clinic. Patient denied AVS, has MyChart.          Review of Systems   Constitutional: Negative.    HENT: Negative.     Eyes: Negative.    Respiratory: Negative.     Cardiovascular: Negative.    Musculoskeletal: Negative.    Skin:  Negative for wound.   Neurological: Negative.    Hematological: Negative.    All other systems reviewed and are negative.        Objective:        Physical Exam  Vitals reviewed.   Constitutional:       Appearance: Normal appearance. He is well-developed.   HENT:      Head: Normocephalic and atraumatic.   Eyes:      Extraocular Movements: Extraocular movements intact.      Conjunctiva/sclera: Conjunctivae normal.      Pupils: Pupils are equal, round, and reactive to light.   Musculoskeletal:      Cervical back: Normal range of motion.   Skin:     General: Skin is warm and dry.      Comments: Wound healed    Neurological:      Mental Status: He is alert and oriented to person, place, and time.   Psychiatric:         Behavior: Behavior normal.         Vitals:    02/27/25 1033   BP: (!) 159/94   Pulse:  97   Resp: 18   Temp: 97.4 °F (36.3 °C)       Assessment:           ICD-10-CM ICD-9-CM   1. Osteomyelitis of fourth toe of left foot  M86.9 730.27            Wound 02/20/25 1058 Other (comment) Left Metatarsal (Active)   02/20/25 1058 Metatarsal   Present on Original Admission: Y   Primary Wound Type: Other   Side: Left   Orientation:    Wound Approximate Age at First Assessment (Weeks):    Wound Number:    Is this injury device related?:    Incision Type:    Closure Method:    Wound Description (Comments):    Type:    Additional Comments:    Ankle-Brachial Index:    Pulses:    Removal Indication and Assessment:    Wound Outcome:    Wound Image   02/27/25 1035   Dressing Appearance Intact;Clean 02/27/25 1035   Drainage Amount None 02/27/25 1035   Appearance Epithelialization 02/27/25 1035   Red (%), Wound Tissue Color 100 % 02/27/25 1035   Periwound Area Intact 02/27/25 1035   Wound Edges Rolled/closed 02/27/25 1035   Wound Length (cm) 0 cm 02/27/25 1035   Wound Width (cm) 0 cm 02/27/25 1035   Wound Depth (cm) 0 cm 02/27/25 1035   Wound Volume (cm^3) 0 cm^3 02/27/25 1035   Wound Surface Area (cm^2) 0 cm^2 02/27/25 1035   Tunneling (depth (cm)/location) 0 02/27/25 1035   Undermining (depth (cm)/location) 0 02/27/25 1035   Care Cleansed with:;Antimicrobial agent;Sterile normal saline 02/27/25 1035   Dressing Applied;Island/border 02/27/25 1035           Plan:          Debridement not needed and Not Done today.   Continue off-loading / leg elevation   Continue eating protein   Keep dressing clean and intact  Continue with wound care orders and plan as noted in orders.   Continue to follow current medication regimen as per pcp   Call for any questions / concerns.       Tissue pathology and/or culture taken     [] Yes      [x] No  Sharp debridement performed                   [] Yes       [x] No  Labs ordered     [] Yes       [x] No  Imaging ordered    [] Yes      [x] No    Orders Placed This Encounter   Procedures     Change dressing     Wound Dressing Orders      Amputation of Left 4th Metatarsal    Remove old dressing  Cleanse or irrigate with: Normal Saline  Apply border dressing   Discharge from wound care clinic        Follow up if symptoms worsen or fail to improve.

## 2025-04-06 ENCOUNTER — HOSPITAL ENCOUNTER (EMERGENCY)
Facility: HOSPITAL | Age: 45
Discharge: HOME | End: 2025-04-06
Attending: STUDENT IN AN ORGANIZED HEALTH CARE EDUCATION/TRAINING PROGRAM
Payer: MEDICAID

## 2025-04-06 VITALS
HEIGHT: 64 IN | RESPIRATION RATE: 18 BRPM | BODY MASS INDEX: 34.15 KG/M2 | OXYGEN SATURATION: 99 % | TEMPERATURE: 98 F | WEIGHT: 200 LBS | HEART RATE: 87 BPM | SYSTOLIC BLOOD PRESSURE: 172 MMHG | DIASTOLIC BLOOD PRESSURE: 100 MMHG

## 2025-04-06 DIAGNOSIS — M79.605 LEG PAIN, BILATERAL: ICD-10-CM

## 2025-04-06 DIAGNOSIS — M79.604 LEG PAIN, BILATERAL: ICD-10-CM

## 2025-04-06 DIAGNOSIS — I82.452 ACUTE DEEP VEIN THROMBOSIS (DVT) OF LEFT PERONEAL VEIN: Primary | ICD-10-CM

## 2025-04-06 LAB
ABSOLUTE EOSINOPHIL (OHS): 0.09 K/UL
ABSOLUTE MONOCYTE (OHS): 0.52 K/UL (ref 0.3–1)
ABSOLUTE NEUTROPHIL COUNT (OHS): 4.19 K/UL (ref 1.8–7.7)
ALBUMIN SERPL BCP-MCNC: 3.8 G/DL (ref 3.5–5.2)
ALP SERPL-CCNC: 71 UNIT/L (ref 40–150)
ALT SERPL W/O P-5'-P-CCNC: 12 UNIT/L (ref 10–44)
ANION GAP (OHS): 8 MMOL/L (ref 8–16)
APTT PPP: 26.2 SECONDS (ref 21–32)
AST SERPL-CCNC: 13 UNIT/L (ref 11–45)
BACTERIA #/AREA URNS AUTO: ABNORMAL /HPF
BASOPHILS # BLD AUTO: 0.02 K/UL
BASOPHILS NFR BLD AUTO: 0.3 %
BILIRUB SERPL-MCNC: 0.3 MG/DL (ref 0.1–1)
BILIRUB UR QL STRIP.AUTO: NEGATIVE
BUN SERPL-MCNC: 40 MG/DL (ref 6–20)
CALCIUM SERPL-MCNC: 9.2 MG/DL (ref 8.7–10.5)
CHLORIDE SERPL-SCNC: 112 MMOL/L (ref 95–110)
CK SERPL-CCNC: 109 U/L (ref 20–200)
CLARITY UR: CLEAR
CO2 SERPL-SCNC: 17 MMOL/L (ref 23–29)
COLOR UR AUTO: COLORLESS
CREAT SERPL-MCNC: 2.5 MG/DL (ref 0.5–1.4)
CTP QC/QA: YES
ERYTHROCYTE [DISTWIDTH] IN BLOOD BY AUTOMATED COUNT: 14.4 % (ref 11.5–14.5)
GFR SERPLBLD CREATININE-BSD FMLA CKD-EPI: 32 ML/MIN/1.73/M2
GLUCOSE SERPL-MCNC: 189 MG/DL (ref 70–110)
GLUCOSE UR QL STRIP: NEGATIVE
HCT VFR BLD AUTO: 36.7 % (ref 40–54)
HGB BLD-MCNC: 12.1 GM/DL (ref 14–18)
HGB UR QL STRIP: NEGATIVE
HYALINE CASTS UR QL AUTO: 4 /LPF (ref 0–1)
IMM GRANULOCYTES # BLD AUTO: 0.01 K/UL (ref 0–0.04)
IMM GRANULOCYTES NFR BLD AUTO: 0.1 % (ref 0–0.5)
INR PPP: 1 (ref 0.8–1.2)
KETONES UR QL STRIP: NEGATIVE
LEUKOCYTE ESTERASE UR QL STRIP: NEGATIVE
LYMPHOCYTES # BLD AUTO: 1.91 K/UL (ref 1–4.8)
MCH RBC QN AUTO: 28.3 PG (ref 27–31)
MCHC RBC AUTO-ENTMCNC: 33 G/DL (ref 32–36)
MCV RBC AUTO: 86 FL (ref 82–98)
MICROSCOPIC COMMENT: ABNORMAL
NITRITE UR QL STRIP: NEGATIVE
NUCLEATED RBC (/100WBC) (OHS): 0 /100 WBC
PH UR STRIP: 6 [PH]
PLATELET # BLD AUTO: 215 K/UL (ref 150–450)
PMV BLD AUTO: 10.2 FL (ref 9.2–12.9)
POC MOLECULAR INFLUENZA A AGN: NEGATIVE
POC MOLECULAR INFLUENZA B AGN: NEGATIVE
POCT GLUCOSE: 211 MG/DL (ref 70–110)
POTASSIUM SERPL-SCNC: 4.9 MMOL/L (ref 3.5–5.1)
PROT SERPL-MCNC: 7.5 GM/DL (ref 6–8.4)
PROT UR QL STRIP: ABNORMAL
PROTHROMBIN TIME: 10.9 SECONDS (ref 9–12.5)
RBC # BLD AUTO: 4.27 M/UL (ref 4.6–6.2)
RBC #/AREA URNS AUTO: 0 /HPF (ref 0–4)
RELATIVE EOSINOPHIL (OHS): 1.3 %
RELATIVE LYMPHOCYTE (OHS): 28.3 % (ref 18–48)
RELATIVE MONOCYTE (OHS): 7.7 % (ref 4–15)
RELATIVE NEUTROPHIL (OHS): 62.3 % (ref 38–73)
SODIUM SERPL-SCNC: 137 MMOL/L (ref 136–145)
SP GR UR STRIP: 1.01
SQUAMOUS #/AREA URNS AUTO: 1 /HPF
UROBILINOGEN UR STRIP-ACNC: NEGATIVE EU/DL
WBC # BLD AUTO: 6.74 K/UL (ref 3.9–12.7)
WBC #/AREA URNS AUTO: 0 /HPF (ref 0–5)

## 2025-04-06 PROCEDURE — 85025 COMPLETE CBC W/AUTO DIFF WBC: CPT

## 2025-04-06 PROCEDURE — 85730 THROMBOPLASTIN TIME PARTIAL: CPT

## 2025-04-06 PROCEDURE — 82962 GLUCOSE BLOOD TEST: CPT

## 2025-04-06 PROCEDURE — 87502 INFLUENZA DNA AMP PROBE: CPT

## 2025-04-06 PROCEDURE — 80053 COMPREHEN METABOLIC PANEL: CPT

## 2025-04-06 PROCEDURE — 85610 PROTHROMBIN TIME: CPT

## 2025-04-06 PROCEDURE — 96360 HYDRATION IV INFUSION INIT: CPT

## 2025-04-06 PROCEDURE — 25000003 PHARM REV CODE 250

## 2025-04-06 PROCEDURE — 82550 ASSAY OF CK (CPK): CPT

## 2025-04-06 PROCEDURE — 99285 EMERGENCY DEPT VISIT HI MDM: CPT | Mod: 25

## 2025-04-06 PROCEDURE — 81003 URINALYSIS AUTO W/O SCOPE: CPT

## 2025-04-06 RX ORDER — OXYCODONE AND ACETAMINOPHEN 5; 325 MG/1; MG/1
1 TABLET ORAL
Refills: 0 | Status: COMPLETED | OUTPATIENT
Start: 2025-04-06 | End: 2025-04-06

## 2025-04-06 RX ORDER — OXYCODONE AND ACETAMINOPHEN 5; 325 MG/1; MG/1
1 TABLET ORAL EVERY 6 HOURS PRN
Qty: 12 TABLET | Refills: 0 | Status: SHIPPED | OUTPATIENT
Start: 2025-04-06

## 2025-04-06 RX ORDER — BUTALBITAL, ACETAMINOPHEN AND CAFFEINE 50; 325; 40 MG/1; MG/1; MG/1
1 TABLET ORAL
Status: COMPLETED | OUTPATIENT
Start: 2025-04-06 | End: 2025-04-06

## 2025-04-06 RX ORDER — SODIUM CHLORIDE 9 MG/ML
1000 INJECTION, SOLUTION INTRAVENOUS
Status: COMPLETED | OUTPATIENT
Start: 2025-04-06 | End: 2025-04-06

## 2025-04-06 RX ADMIN — OXYCODONE HYDROCHLORIDE AND ACETAMINOPHEN 1 TABLET: 5; 325 TABLET ORAL at 09:04

## 2025-04-06 RX ADMIN — BUTALBITAL, ACETAMINOPHEN, AND CAFFEINE 1 TABLET: 325; 50; 40 TABLET ORAL at 11:04

## 2025-04-06 RX ADMIN — SODIUM CHLORIDE 1000 ML: 9 INJECTION, SOLUTION INTRAVENOUS at 09:04

## 2025-04-06 NOTE — DISCHARGE INSTRUCTIONS
Hoy le diagnosticaron un coágulo de catherine en la [place]. Probablemente se deba a louis cirugía previa. [place] Eliquis carlos a se lo indicamos y según lo prescrito. Tomará comprimidos de 10 mg dos veces al día karina louis semana. Después, tomará 5 mg dos veces al día indefinidamente hasta que el especialista le dé el anali. Si sufre alguna caída, debe acudir al servicio de urgencias más cercano. Si presenta dolor en el pecho o dificultad para respirar, acuda al servicio de urgencias más cercano. No tome ningún antiinflamatorio carlos a se lo indicamos, ya que esto puede aumentar el riesgo de sangrado.

## 2025-04-06 NOTE — ED PROVIDER NOTES
Encounter Date: 4/6/2025       History     Chief Complaint   Patient presents with    Headache    Foot Pain     Pt presents to the ED with c/o 5/10 headache, bilateral leg pain, and lower back beginning yesterday. Patient denies injury, numbness/tingling, swelling, or OTC medications.        Patient is a 44-year-old male with a past medical history of hypertension, diabetes, chronic kidney disease who presents to the emergency department for evaluation of headache, lower back pain, bilateral leg pain x 1 day.  Denies trauma or injury.  Reports leg and back pain occur randomly and not always together. Reports he does not currently have back pain, just leg pain. Describes as achy. He does report having surgery 1 month ago where a toe on his left foot was removed due to an infection.  Denies history of blood clots. Denies leg swelling. He denies dysuria, urinary frequency, hematuria.  Denies abdominal pain, nausea, vomiting, diarrhea.  Denies fever, chills, cough, congestion, rhinorrhea, sore throat.  Denies lightheadedness, dizziness, changes in vision, chest pain, shortness of breath.  Denies bowel or bladder incontinence.    He denies any history of prolonged heat exposure. No history of starting any new medications.      The history is provided by the patient. The history is limited by a language barrier. A  was used ([id number]).     Review of patient's allergies indicates:  No Known Allergies  Past Medical History:   Diagnosis Date    Chronic kidney disease, stage 2, mildly decreased GFR 01/28/2025    Diabetes mellitus     Hypertension      Past Surgical History:   Procedure Laterality Date    APPENDECTOMY      TOE AMPUTATION Left 1/28/2025    Procedure: AMPUTATION, TOE 4th TOE;  Surgeon: Ivis Wilson DPM;  Location: [facility] OR;  Service: Podiatry;  Laterality: Left;     Family History   Problem Relation Name Age of Onset    Diabetes Mother       Social History[1]  Review of Systems    Constitutional:  Negative for chills and fever.   HENT:  Negative for congestion, ear pain, rhinorrhea, sore throat and trouble swallowing.    Eyes:  Negative for visual disturbance.   Respiratory:  Negative for cough and shortness of breath.    Cardiovascular:  Negative for chest pain.   Gastrointestinal:  Negative for abdominal pain, diarrhea, nausea and vomiting.   Genitourinary:  Negative for dysuria, flank pain, frequency, hematuria and testicular pain.   Musculoskeletal:  Positive for arthralgias and back pain. Negative for gait problem, joint swelling, myalgias, neck pain and neck stiffness.   Neurological:  Positive for headaches. Negative for dizziness, syncope, weakness, light-headedness and numbness.       Physical Exam     Initial Vitals [04/06/25 0919]   BP Pulse Resp Temp SpO2   (!) 166/95 92 18 97.8 °F (36.6 °C) 99 %      MAP       --         Physical Exam    Nursing note and vitals reviewed.  Constitutional: He appears well-developed and well-nourished.   HENT:   Head: Normocephalic and atraumatic.   Right Ear: External ear normal.   Left Ear: External ear normal.   Eyes: Conjunctivae and EOM are normal. Pupils are equal, round, and reactive to light.   Neck: Carotid bruit is not present.   No meningismus.   Normal range of motion.  Cardiovascular:  Normal rate, regular rhythm, normal heart sounds and intact distal pulses.     Exam reveals no gallop and no friction rub.       No murmur heard.  Pulmonary/Chest: Breath sounds normal. No respiratory distress. He has no wheezes. He has no rhonchi. He has no rales.   Abdominal: Abdomen is soft. Bowel sounds are normal. He exhibits no distension. There is no abdominal tenderness. There is no rebound and no guarding.   Musculoskeletal:         General: Normal range of motion.      Cervical back: Normal range of motion.      Comments: There is no tenderness to the lumbar spine or paraspinal musculature. Patient is ambulatory with steady gait. No ataxia.      Normal range of motion of the bilateral lower extremities without pain. No tenderness with palpation over the calves, popliteal regions bilaterally. Negative homans sign. 2+ DP pulses bilaterally. Patient ambulatory with steady gait.    s/p amputation of the left fourth toe.     Neurological: He is alert and oriented to person, place, and time. He has normal strength. No cranial nerve deficit or sensory deficit. GCS score is 15. GCS eye subscore is 4. GCS verbal subscore is 5. GCS motor subscore is 6.   Reassuring neurological exam. No focal deficits.    Psychiatric: He has a normal mood and affect.       ED Course   Procedures  Labs Reviewed   COMPREHENSIVE METABOLIC PANEL - Abnormal       Result Value    Sodium 137      Potassium 4.9      Chloride 112 (*)     CO2 17 (*)     Glucose 189 (*)     BUN 40 (*)     Creatinine 2.5 (*)     Calcium 9.2      Protein Total 7.5      Albumin 3.8      Bilirubin Total 0.3      ALP 71      AST 13      ALT 12      Anion Gap 8      eGFR 32 (*)    URINALYSIS, REFLEX TO URINE CULTURE - Abnormal    Color, UA Colorless (*)     Appearance, UA Clear      pH, UA 6.0      Spec Grav UA 1.010      Protein, UA 1+ (*)     Glucose, UA Negative      Ketones, UA Negative      Bilirubin, UA Negative      Blood, UA Negative      Nitrites, UA Negative      Urobilinogen, UA Negative      Leukocyte Esterase, UA Negative     CBC WITH DIFFERENTIAL - Abnormal    WBC 6.74      RBC 4.27 (*)     HGB 12.1 (*)     HCT 36.7 (*)     MCV 86      MCH 28.3      MCHC 33.0      RDW 14.4      Platelet Count 215      MPV 10.2      Nucleated RBC 0      Neut % 62.3      Lymph % 28.3      Mono % 7.7      Eos % 1.3      Basophil % 0.3      Imm Grans % 0.1      Neut # 4.19      Lymph # 1.91      Mono # 0.52      Eos # 0.09      Baso # 0.02      Imm Grans # 0.01     URINALYSIS MICROSCOPIC - Abnormal    RBC, UA 0      WBC, UA 0      Bacteria, UA None      Squamous Epithelial Cells, UA 1      Hyaline Casts, UA 4 (*)      Microscopic Comment       POCT GLUCOSE - Abnormal    POCT Glucose 211 (*)    CK - Normal         PROTIME-INR - Normal    PT 10.9      INR 1.0     APTT - Normal    PTT 26.2     CBC W/ AUTO DIFFERENTIAL    Narrative:     The following orders were created for panel order CBC auto differential.  Procedure                               Abnormality         Status                     ---------                               -----------         ------                     CBC with Differential[[id number]]       Abnormal            Final result                 Please view results for these tests on the individual orders.   POCT INFLUENZA A/B MOLECULAR    POC Molecular Influenza A Ag Negative      POC Molecular Influenza B Ag Negative       Acceptable Yes     SARS-COV-2 RDRP GENE          Imaging Results              US Lower Extremity Veins Bilateral (Final result)  Result time 04/06/25 11:40:53      Final result by Keshav Smith MD (04/06/25 11:40:53)                   Impression:      No evidence of deep venous thrombosis in either lower extremity above the knee..    Incomplete compressibility with focal nonocclusive thrombus, likely chronic given increased echogenicity LEFT posterior peroneal vein.      Electronically signed by: Keshav Smith MD  Date:    04/06/2025  Time:    11:40               Narrative:    EXAMINATION:  US LOWER EXTREMITY VEINS BILATERAL    CLINICAL HISTORY:  Pain in right leg    TECHNIQUE:  Duplex and color flow Doppler and dynamic compression was performed of the bilateral lower extremity veins was performed.    COMPARISON:  None    FINDINGS:  Right thigh veins: The common femoral, femoral, popliteal, upper greater saphenous, and deep femoral veins are patent and free of thrombus. The veins are normally compressible and have normal phasic flow and augmentation response.    Right calf veins: The visualized calf veins are patent.    Left thigh veins: The common femoral,  femoral, popliteal, upper greater saphenous, and deep femoral veins are patent and free of thrombus. The veins are normally compressible and have normal phasic flow and augmentation response.    Left calf veins: The visualized calf veins are patent with the exception of LEFT posterior peroneal vein which demonstrates nonocclusive thrombus..    Miscellaneous: None                                       X-Ray Lumbar Spine Ap And Lateral (Final result)  Result time 04/06/25 10:13:45      Final result by Anai Morin MD (04/06/25 10:13:45)                   Impression:      Straightening of the normal lumbar lordosis which can be seen in muscle spasm.    Pelvic vascular calcifications present.  Uncertain of the clinical significance of this finding.      Electronically signed by: Anai Morin MD  Date:    04/06/2025  Time:    10:13               Narrative:    EXAMINATION:  XR LUMBAR SPINE AP AND LATERAL    CLINICAL HISTORY:  low back pain;    TECHNIQUE:  AP and lateral views as well as coned-down lateral images are performed through the lumbar spine.    COMPARISON:  None    FINDINGS:  AP, lateral and coned down lateral radiographs of the lumbar spine reveal 5 non-rib bearing lumbar vertebral bodies with normal vertebral body heights , pedicles and disk spaces.  There is straightening of the normal lumbar lordosis.  Otherwise, there is no malalignment, spondylolysis or spondylolisthesis.  There is no significant facet arthropathy.  Pelvic vascular calcifications are present.  The surrounding soft tissues are normal.                                       Medications   0.9% NaCl infusion (0 mLs Intravenous Stopped 4/6/25 1042)   oxyCODONE-acetaminophen 5-325 mg per tablet 1 tablet (1 tablet Oral Given 4/6/25 5430)   butalbital-acetaminophen-caffeine -40 mg per tablet 1 tablet (1 tablet Oral Given 4/6/25 1125)     Medical Decision Making  This is an emergent evaluation of a 44-year-old male with a past  medical history of hypertension, diabetes, chronic kidney disease who presents to the emergency department for evaluation of headache, lower back pain, bilateral leg pain x 1 day.    Physical exam as above.    Differential diagnosis includes but is not limited to fracture, dislocation, strain, sciatica, UTI, DVT, PAD, compartment syndrome, COVID, flu, other viral syndrome.  Considered cauda equina syndrome but highly doubtful given no trauma or injury, no bowel or bladder incontinence, normal neuro exam without deficits.  Considered conus medullaris syndrome but highly doubtful given no trauma or injury, no overflow incontinence, normal neuro exam without deficits.  Considered infectious etiology such as meningitis, encephalitis, epidural abscess but highly doubtful given no history of fever, no history of epidural injections, no IV drug use, normal neuro exam without deficits.  Considered abdominal aortic aneurysm but highly doubtful given no pulsatile mass on abdominal exam, 2+ radial pulses that are equal and symmetric on exam.     Workup initiated with basic labs, CPK, coags, urinalysis, viral swabs, x-ray of lumbar spine, ultrasound lower extremity veins.  Ordered fluids, 1 Percocet for acute pain.  Vital signs, chart, labs, and/or imaging were all reviewed.  See ED course below and interpretations above. My overall impression is DVT. Will discharge home with Eliquis, follow up with Cardiology, vascular surgery, Podiatry.  Precautions provided to patient as he is risk for bleeding once starting medication. Vital signs are reassuring. Patient/Caregiver is stable for discharge at this time.  Patient/Caregiver was informed of results, plan of care, and are comfortable with this.  All questions and concerns were addressed. Discussed strict return precautions with the patient/caregiver. Instructed follow up with primary care provider within 1 week.      Kunal Guerrero PA-C    DISCLAIMER: This note was prepared with  MModal voice recognition transcription software. Garbled syntax, mangled pronouns, and other bizarre constructions may be attributed to that software system.       Amount and/or Complexity of Data Reviewed  Labs: ordered. Decision-making details documented in ED Course.  Radiology: ordered. Decision-making details documented in ED Course.    Risk  Prescription drug management.               ED Course as of 04/06/25 1240   Sun Apr 06, 2025   0934 BP(!): 166/95 [TM]   0934 Temp: 97.8 °F (36.6 °C) [TM]   0934 Pulse: 92 [TM]   0934 Resp: 18 [TM]   0934 SpO2: 99 % [TM]   0934 POCT glucose(!)  211 [TM]   0946 Patient had amputation of left 4th toe 2/2 osteomyelitis with Dr. Wilson on 1/28/25. [TM]   1009 CBC auto differential(!)  CBC is without leukocytosis.  H&H improved compared to labs 2 months ago. [TM]   1041 Urinalysis, Reflex to Urine Culture(!)  No signs of infection [TM]   1042 Comprehensive metabolic panel(!)  CMP BUN/creatinine of 40/2.5 which is patient's baseline.  Glucose of 189, chloride of 112, no other abnormalities. [TM]   1042 CPK  Within normal limits, doubt rhabdomyolysis. [TM]   1042 POCT Influenza A/B Molecular  Negative [TM]   1042 APTT  Normal [TM]   1042 Protime-INR  Normal [TM]   1043 X-Ray Lumbar Spine Ap And Lateral  Impression:     Straightening of the normal lumbar lordosis which can be seen in muscle spasm.     Pelvic vascular calcifications present.  Uncertain of the clinical significance of this finding.   [TM]   1043 1/29/2025 US arterial:    FINDINGS:  Peak systolic velocities were obtained as follows (in cm/sec):     Right common femoral:  122     Right deep femoral:  55     Right superficial femoral proximal: 116     Right superficial femoral mid: 83     Right superficial femoral distal: 90     Right proximal popliteal:  93     Right distal popliteal:  76     Right anterior tibial:  91     Right peroneal:  69     Right posterior tibial:  121     Right dorsalis pedis: 125     Left common  femoral:  116     Left deep femoral:  57     Left superficial femoral proximal: 128     Left superficial femoral mid: 119     Left superficial femoral distal: 102     Left proximal popliteal: 80     Left distal popliteal: 75     Left anterior tibial:  100     Left peroneal:   88     Left posterior tibial: 122     Left dorsalis pedis: 128     Triphasic waveforms throughout the lower extremities bilaterally.     Impression:     No hemodynamically significant stenosis.        Electronically signed by:Ziyad Carrizales MD   [TM]   1048 Ordered Fioricet as patient is requesting headache medicine. Reports his back and leg pain is improved. [TM]   1145 US Lower Extremity Veins Bilateral  No evidence of deep venous thrombosis in either lower extremity above the knee..     Incomplete compressibility with focal nonocclusive thrombus, likely chronic given increased echogenicity LEFT posterior peroneal vein.   [TM]   1204 Discussed findings with patient.  Use the  ID number [id number].    I have instructed the patient to take Eliquis 10 mg twice daily for 1 week followed by 5 mg tablets twice daily.  I have informed him that he will be on this for at least 3 months.  Will refer to Cardiology, vascular surgery, Podiatry.  He understands the importance of follow-up.  We have also discussed the risk of bleeding.  Was instructed to go to the nearest ED sure how he have any falls or injuries.  Per up-to-date, there is no renal dose adjustment necessary for venous thromboembolism. I discussed with my attending Dr. Sorto who agrees.  Patient has no contraindications to anticoagulation.  He does not have any chest pain or shortness of breath. Low suspicion of PE. Pain improved here in ED. VTE-BLEED score of 1.5 which is low risk of bleeding. Will discharge home with Eliquis, short course of pain medication, outpatient follow-up.  Patient verbalizes understanding of care plan. [TM]      ED Course User Index  [TM] Kunal Guerrero  SABRINA COPPOLA                           Clinical Impression:  Final diagnoses:  [M79.604, M79.605] Leg pain, bilateral  [I82.452] Acute deep vein thrombosis (DVT) of left peroneal vein (Primary)          ED Disposition Condition    Discharge Stable          ED Prescriptions       Medication Sig Dispense Start Date End Date Auth. Provider    apixaban (ELIQUIS) 5 mg Tab Take 2 tablets (10 mg total) by mouth 2 (two) times daily. for 7 days 28 tablet 4/6/2025 4/13/2025 Kunal Guerrero PA-C    apixaban (ELIQUIS) 5 mg Tab Take 1 tablet (5 mg total) by mouth 2 (two) times daily. 60 tablet 4/6/2025 5/6/2025 Kunal Guerrero PA-C    oxyCODONE-acetaminophen (PERCOCET) 5-325 mg per tablet Take 1 tablet by mouth every 6 (six) hours as needed for Pain. 12 tablet 4/6/2025 -- Kunal Guerrero PA-C          Follow-up Information       Follow up With Specialties Details Why Contact [facility] Emergency Dept Emergency Medicine Go to  As needed, If symptoms worsen, or new symptoms develop [address] [facility] [place] [address] [phone]    Primary care doctor  Schedule an appointment as soon as possible for a visit in 3 days      Kary Muñoz MD Cardiology, Interventional Cardiology   [address]  [address]  [facility] [address]  [phone]                   [1]   Social History  Tobacco Use    Smoking status: Never     Passive exposure: Never    Smokeless tobacco: Never   Substance Use Topics    Alcohol use: Yes     Comment: occasionally     Drug use: No        Kunal Guerrero PA-C  04/06/25 7252

## 2025-04-06 NOTE — ED TRIAGE NOTES
Robel Desai, a 44 y.o. male presents to the ED via PV with CC of bilateral calf pain and back pain onset yesterday. Pt denies injury.

## 2025-04-06 NOTE — Clinical Note
"Robel"Sarabjit Desai was seen and treated in our emergency department on 4/6/2025.  He may return to work on 04/10/2025.       If you have any questions or concerns, please don't hesitate to call.      Kunal Guerrero PA-C"

## (undated) DEVICE — DRESSING N ADH OIL EMUL 3X3

## (undated) DEVICE — BNDG COFLEX FOAM LF2 ST 4X5YD

## (undated) DEVICE — CONTAINER SPECIMEN OR STER 4OZ

## (undated) DEVICE — GOWN NONREINF SET-IN SLV XL

## (undated) DEVICE — TOURNIQUET SB QC DP 18X4IN

## (undated) DEVICE — PACK ARTHROSCOPY W/ISO BAC

## (undated) DEVICE — DERM CURETTE 5MM DISP

## (undated) DEVICE — SEE MEDLINE ITEM 107746

## (undated) DEVICE — GLOVE SURGICAL LATEX SZ 7

## (undated) DEVICE — GLOVE SURGICAL LATEX SZ 8

## (undated) DEVICE — PAD PREP 50/CA

## (undated) DEVICE — PADDING CAST 4IN SPECIALIST

## (undated) DEVICE — SWAB CULTURETTE SINGLE

## (undated) DEVICE — ELECTRODE REM PLYHSV RETURN 9

## (undated) DEVICE — GAUZE CNFRM STRL 4INX4.1YD

## (undated) DEVICE — PAD PREP CUFFED NS 24X48IN

## (undated) DEVICE — NDL HYPO REG 25G X 1 1/2

## (undated) DEVICE — SEE MEDLINE ITEM 157173

## (undated) DEVICE — SUT ETHILON 3-0 PS2 18 BLK

## (undated) DEVICE — SEE MEDLINE ITEM 146345

## (undated) DEVICE — BOOT WALKER ROCKER SMALL

## (undated) DEVICE — SOL IRR SOD CHL .9% POUR

## (undated) DEVICE — SOL BETADINE 5%

## (undated) DEVICE — Device

## (undated) DEVICE — COVER OVERHEAD SURG LT BLUE

## (undated) DEVICE — BNDG COFLEX FOAM LF2 ST 3X5YD

## (undated) DEVICE — PAD CAST SPECIALIST STRL 4

## (undated) DEVICE — SPONGE LAP 18X18 PREWASHED

## (undated) DEVICE — SEE MEDLINE ITEM 152515

## (undated) DEVICE — PULSAVAC ZIMMER

## (undated) DEVICE — DRAPE THREE-QUARTER 53X77IN

## (undated) DEVICE — SYR 10CC LUER LOCK

## (undated) DEVICE — SWAB BBL CULTURETTE

## (undated) DEVICE — NDL 27G X 1 1/4

## (undated) DEVICE — GAUZE SPONGE 4'X4 12 PLY

## (undated) DEVICE — GAUZE PACKING STRIP PLN 1/4X5

## (undated) DEVICE — CANISTER SUCTION RIGID 2000CC

## (undated) DEVICE — SOL IRR NACL .9% 3000ML

## (undated) DEVICE — SPONGE COTTON TRAY 4X4IN

## (undated) DEVICE — BANDAGE ELAS SOFTWRAP ST 4X5YD

## (undated) DEVICE — DRAPE PLASTIC U 60X72

## (undated) DEVICE — BLANKET WARMING UPPER BODY

## (undated) DEVICE — CHLORAPREP W TINT 26ML APPL